# Patient Record
Sex: MALE | Race: WHITE | NOT HISPANIC OR LATINO | Employment: PART TIME | URBAN - METROPOLITAN AREA
[De-identification: names, ages, dates, MRNs, and addresses within clinical notes are randomized per-mention and may not be internally consistent; named-entity substitution may affect disease eponyms.]

---

## 2017-01-31 ENCOUNTER — OFFICE VISIT (OUTPATIENT)
Dept: INTERNAL MEDICINE | Facility: CLINIC | Age: 67
End: 2017-01-31
Payer: COMMERCIAL

## 2017-01-31 VITALS
OXYGEN SATURATION: 95 % | WEIGHT: 203.6 LBS | DIASTOLIC BLOOD PRESSURE: 66 MMHG | TEMPERATURE: 98.3 F | HEART RATE: 90 BPM | SYSTOLIC BLOOD PRESSURE: 122 MMHG | HEIGHT: 68 IN | BODY MASS INDEX: 30.86 KG/M2

## 2017-01-31 DIAGNOSIS — E11.319 DIABETIC RETINOPATHY ASSOCIATED WITH TYPE 2 DIABETES MELLITUS, MACULAR EDEMA PRESENCE UNSPECIFIED, UNSPECIFIED RETINOPATHY SEVERITY: ICD-10-CM

## 2017-01-31 DIAGNOSIS — Z98.84 BARIATRIC SURGERY STATUS: Primary | ICD-10-CM

## 2017-01-31 DIAGNOSIS — N20.0 RENAL CALCULUS: ICD-10-CM

## 2017-01-31 DIAGNOSIS — R53.83 FATIGUE, UNSPECIFIED TYPE: ICD-10-CM

## 2017-01-31 DIAGNOSIS — Z11.59 NEED FOR HEPATITIS C SCREENING TEST: ICD-10-CM

## 2017-01-31 DIAGNOSIS — E55.9 VITAMIN D DEFICIENCY: ICD-10-CM

## 2017-01-31 DIAGNOSIS — E11.65 TYPE 2 DIABETES MELLITUS WITH HYPERGLYCEMIA, WITHOUT LONG-TERM CURRENT USE OF INSULIN (H): ICD-10-CM

## 2017-01-31 DIAGNOSIS — E53.8 VITAMIN B12 DEFICIENCY (NON ANEMIC): ICD-10-CM

## 2017-01-31 DIAGNOSIS — G62.0 VITAMIN B6 INDUCED NEUROPATHY (H): ICD-10-CM

## 2017-01-31 DIAGNOSIS — N20.0 RECURRENT KIDNEY STONES: ICD-10-CM

## 2017-01-31 DIAGNOSIS — T45.2X5A VITAMIN B6 INDUCED NEUROPATHY (H): ICD-10-CM

## 2017-01-31 DIAGNOSIS — D50.9 IRON DEFICIENCY ANEMIA, UNSPECIFIED IRON DEFICIENCY ANEMIA TYPE: ICD-10-CM

## 2017-01-31 DIAGNOSIS — Z13.6 CARDIOVASCULAR SCREENING; LDL GOAL LESS THAN 100: ICD-10-CM

## 2017-01-31 DIAGNOSIS — E34.9 TESTOSTERONE DEFICIENCY: ICD-10-CM

## 2017-01-31 LAB
ALBUMIN SERPL-MCNC: 4.4 G/DL (ref 3.4–5)
ALP SERPL-CCNC: 103 U/L (ref 40–150)
ALT SERPL W P-5'-P-CCNC: 29 U/L (ref 0–70)
ANION GAP SERPL CALCULATED.3IONS-SCNC: 6 MMOL/L (ref 3–14)
AST SERPL W P-5'-P-CCNC: 14 U/L (ref 0–45)
BILIRUB SERPL-MCNC: 0.4 MG/DL (ref 0.2–1.3)
BUN SERPL-MCNC: 28 MG/DL (ref 7–30)
CALCIUM SERPL-MCNC: 9.1 MG/DL (ref 8.5–10.1)
CHLORIDE SERPL-SCNC: 105 MMOL/L (ref 94–109)
CHOLEST SERPL-MCNC: 152 MG/DL
CO2 SERPL-SCNC: 29 MMOL/L (ref 20–32)
CREAT SERPL-MCNC: 1.12 MG/DL (ref 0.66–1.25)
ERYTHROCYTE [DISTWIDTH] IN BLOOD BY AUTOMATED COUNT: 14.7 % (ref 10–15)
FERRITIN SERPL-MCNC: 17 NG/ML (ref 26–388)
GFR SERPL CREATININE-BSD FRML MDRD: 66 ML/MIN/1.7M2
GLUCOSE SERPL-MCNC: 110 MG/DL (ref 70–99)
HCT VFR BLD AUTO: 39.8 % (ref 40–53)
HDLC SERPL-MCNC: 34 MG/DL
HGB BLD-MCNC: 13 G/DL (ref 13.3–17.7)
IRON SATN MFR SERPL: 15 % (ref 15–46)
IRON SERPL-MCNC: 65 UG/DL (ref 35–180)
LDLC SERPL CALC-MCNC: 84 MG/DL
MCH RBC QN AUTO: 29.1 PG (ref 26.5–33)
MCHC RBC AUTO-ENTMCNC: 32.7 G/DL (ref 31.5–36.5)
MCV RBC AUTO: 89 FL (ref 78–100)
NONHDLC SERPL-MCNC: 118 MG/DL
PLATELET # BLD AUTO: 209 10E9/L (ref 150–450)
POTASSIUM SERPL-SCNC: 3.9 MMOL/L (ref 3.4–5.3)
PROT SERPL-MCNC: 8.6 G/DL (ref 6.8–8.8)
RBC # BLD AUTO: 4.47 10E12/L (ref 4.4–5.9)
SODIUM SERPL-SCNC: 140 MMOL/L (ref 133–144)
T4 FREE SERPL-MCNC: 0.82 NG/DL (ref 0.76–1.46)
TIBC SERPL-MCNC: 437 UG/DL (ref 240–430)
TRIGL SERPL-MCNC: 169 MG/DL
TSH SERPL DL<=0.05 MIU/L-ACNC: 2.84 MU/L (ref 0.4–4)
WBC # BLD AUTO: 5.5 10E9/L (ref 4–11)

## 2017-01-31 PROCEDURE — 84443 ASSAY THYROID STIM HORMONE: CPT | Performed by: INTERNAL MEDICINE

## 2017-01-31 PROCEDURE — 84439 ASSAY OF FREE THYROXINE: CPT | Performed by: INTERNAL MEDICINE

## 2017-01-31 PROCEDURE — 82180 ASSAY OF ASCORBIC ACID: CPT | Mod: 90 | Performed by: INTERNAL MEDICINE

## 2017-01-31 PROCEDURE — 80053 COMPREHEN METABOLIC PANEL: CPT | Performed by: INTERNAL MEDICINE

## 2017-01-31 PROCEDURE — 83036 HEMOGLOBIN GLYCOSYLATED A1C: CPT | Performed by: INTERNAL MEDICINE

## 2017-01-31 PROCEDURE — 82728 ASSAY OF FERRITIN: CPT | Performed by: INTERNAL MEDICINE

## 2017-01-31 PROCEDURE — 36415 COLL VENOUS BLD VENIPUNCTURE: CPT | Performed by: INTERNAL MEDICINE

## 2017-01-31 PROCEDURE — 83540 ASSAY OF IRON: CPT | Performed by: INTERNAL MEDICINE

## 2017-01-31 PROCEDURE — 82043 UR ALBUMIN QUANTITATIVE: CPT | Performed by: INTERNAL MEDICINE

## 2017-01-31 PROCEDURE — 83550 IRON BINDING TEST: CPT | Performed by: INTERNAL MEDICINE

## 2017-01-31 PROCEDURE — 85027 COMPLETE CBC AUTOMATED: CPT | Performed by: INTERNAL MEDICINE

## 2017-01-31 PROCEDURE — 84207 ASSAY OF VITAMIN B-6: CPT | Mod: 90 | Performed by: INTERNAL MEDICINE

## 2017-01-31 PROCEDURE — 82746 ASSAY OF FOLIC ACID SERUM: CPT | Performed by: INTERNAL MEDICINE

## 2017-01-31 PROCEDURE — 86803 HEPATITIS C AB TEST: CPT | Performed by: INTERNAL MEDICINE

## 2017-01-31 PROCEDURE — 82306 VITAMIN D 25 HYDROXY: CPT | Performed by: INTERNAL MEDICINE

## 2017-01-31 PROCEDURE — 99215 OFFICE O/P EST HI 40 MIN: CPT | Performed by: INTERNAL MEDICINE

## 2017-01-31 PROCEDURE — 80061 LIPID PANEL: CPT | Performed by: INTERNAL MEDICINE

## 2017-01-31 PROCEDURE — 84425 ASSAY OF VITAMIN B-1: CPT | Mod: 90 | Performed by: INTERNAL MEDICINE

## 2017-01-31 PROCEDURE — 82365 CALCULUS SPECTROSCOPY: CPT | Mod: 90 | Performed by: INTERNAL MEDICINE

## 2017-01-31 PROCEDURE — 99000 SPECIMEN HANDLING OFFICE-LAB: CPT | Performed by: INTERNAL MEDICINE

## 2017-01-31 PROCEDURE — 82607 VITAMIN B-12: CPT | Performed by: INTERNAL MEDICINE

## 2017-01-31 RX ORDER — GLIMEPIRIDE 1 MG/1
1 TABLET ORAL 2 TIMES DAILY
Qty: 90 TABLET | Refills: 3 | Status: SHIPPED | OUTPATIENT
Start: 2017-01-31 | End: 2017-08-21

## 2017-01-31 RX ORDER — BLOOD SUGAR DIAGNOSTIC
STRIP MISCELLANEOUS
COMMUNITY
Start: 2016-09-14 | End: 2018-09-27

## 2017-01-31 RX ORDER — PYRIDOXINE HCL (VITAMIN B6) 25 MG
25 TABLET ORAL DAILY
Qty: 90 TABLET | Refills: 2 | Status: SHIPPED | OUTPATIENT
Start: 2017-01-31 | End: 2017-04-04

## 2017-01-31 RX ORDER — FOLIC ACID 1 MG/1
1 TABLET ORAL DAILY
Qty: 100 TABLET | Refills: 3 | Status: SHIPPED | OUTPATIENT
Start: 2017-01-31 | End: 2017-08-24

## 2017-01-31 RX ORDER — METFORMIN HCL 500 MG
1000 TABLET, EXTENDED RELEASE 24 HR ORAL 2 TIMES DAILY WITH MEALS
Qty: 180 TABLET | Refills: 3 | Status: SHIPPED | OUTPATIENT
Start: 2017-01-31 | End: 2017-04-04

## 2017-01-31 RX ORDER — MELATONIN 10 MG
10000 TABLET, SUBLINGUAL SUBLINGUAL DAILY
Qty: 90 TABLET | Refills: 3 | Status: SHIPPED | OUTPATIENT
Start: 2017-01-31 | End: 2017-08-24

## 2017-01-31 RX ORDER — TAMSULOSIN HYDROCHLORIDE 0.4 MG/1
0.4 CAPSULE ORAL DAILY
Qty: 90 CAPSULE | Refills: 3 | Status: SHIPPED | OUTPATIENT
Start: 2017-01-31 | End: 2017-08-24

## 2017-01-31 NOTE — PROGRESS NOTES
"  SUBJECTIVE:                                                    Los Renee is a 66 year old male who presents to clinic today for the following health issues:      Diabetes Follow-up      Patient is checking blood sugars: Checking only when feel like the sugars are low and they are below 80 - Very rarely when skipping meals.    Diabetic concerns: None     Symptoms of hypoglycemia (low blood sugar):  rarely when skipping meals.     Paresthesias (numbness or burning in feet) or sores: No     Date of last diabetic eye exam: 10/2016       Amount of exercise or physical activity: 4-5 days/week for an average of 45-60 minutes    Problems taking medications regularly: No    Medication side effects: none  Diet: regular (no restrictions)  Flank discomfort      Duration: 1 month    Description (location/character/radiation): L sided.    Intensity:  mild    Accompanying signs and symptoms: none    History (similar episodes/previous evaluation): Passing kidney stones occasionally and feels like some in the L kidney.    Precipitating or alleviating factors: None    Therapies tried and outcome: None     He has a history of recurrent kidney stones, better since taking calcium supplements.    Other significant issues as outlined and addressed in the plan section of this note.      Problem list and histories reviewed & adjusted, as indicated.  Additional history: as documented    Labs reviewed in EPIC  Problem list, Medication list, Allergies, and Medical/Social/Surgical histories reviewed in [facility] and updated as appropriate.    ROS:  14 point ROS negative except as above      OBJECTIVE:                                                    /66 mmHg  Pulse 90  Temp(Src) 98.3  F (36.8  C) (Oral)  Ht 5' 7.5\" (1.715 m)  Wt 203 lb 9.6 oz (92.352 kg)  BMI 31.40 kg/m2  SpO2 95%  Body mass index is 31.4 kg/(m^2).  GENERAL: alert, no distress, overweight, truncal obesity with proximal muscle atrophy  EYES: Eyes grossly normal " to inspection, PERRL and conjunctivae and sclerae normal  NECK: no adenopathy, no asymmetry, masses, or scars and thyroid normal to palpation  RESP: lungs clear to auscultation - no rales, rhonchi or wheezes  CV: regular rate and rhythm, normal S1 S2, no S3 or S4, no murmur, click or rub, no peripheral edema and peripheral pulses strong  ABDOMEN: soft, nontender, no hepatosplenomegaly, no masses and bowel sounds normal  MS: no gross musculoskeletal defects noted, no edema  PSYCH: mentation appears normal, affect normal/bright    Diagnostic Test Results:  Results for orders placed or performed in visit on 11/08/16   Vitamin D Deficiency   Result Value Ref Range    Vitamin D Deficiency screening 67 20 - 75 ug/L   Vitamin C   Result Value Ref Range    Vitamin C 28    Vitamin B12   Result Value Ref Range    Vitamin B12 756 193 - 986 pg/mL   Vitamin B1 whole blood   Result Value Ref Range    Vitamin B1 Whole Blood Level 72    TSH   Result Value Ref Range    TSH 2.76 0.40 - 4.00 mU/L   T4 FREE   Result Value Ref Range    T4 Free 0.92 0.76 - 1.46 ng/dL   Testosterone Free and Total   Result Value Ref Range    Testosterone Total 268 240 - 950 ng/dL    Sex Hormone Binding Globulin 42 11 - 80 nmol/L    Free Testosterone Calculated 4.47 (L) 4.7 - 24.4 ng/dL   Ferritin   Result Value Ref Range    Ferritin 13 (L) 26 - 388 ng/mL   Iron and iron binding capacity   Result Value Ref Range    Iron 43 35 - 180 ug/dL    Iron Binding Cap 436 (H) 240 - 430 ug/dL    Iron Saturation Index 10 (L) 15 - 46 %   Comprehensive metabolic panel   Result Value Ref Range    Sodium 140 133 - 144 mmol/L    Potassium 4.0 3.4 - 5.3 mmol/L    Chloride 106 94 - 109 mmol/L    Carbon Dioxide 28 20 - 32 mmol/L    Anion Gap 6 3 - 14 mmol/L    Glucose 101 (H) 70 - 99 mg/dL    Urea Nitrogen 24 7 - 30 mg/dL    Creatinine 1.14 0.66 - 1.25 mg/dL    GFR Estimate 65 >60 mL/min/1.7m2    GFR Estimate If Black 78 >60 mL/min/1.7m2    Calcium 8.7 8.5 - 10.1 mg/dL     Bilirubin Total 0.3 0.2 - 1.3 mg/dL    Albumin 4.2 3.4 - 5.0 g/dL    Protein Total 8.0 6.8 - 8.8 g/dL    Alkaline Phosphatase 90 40 - 150 U/L    ALT 22 0 - 70 U/L    AST 13 0 - 45 U/L   CBC with platelets   Result Value Ref Range    WBC 5.3 4.0 - 11.0 10e9/L    RBC Count 4.22 (L) 4.4 - 5.9 10e12/L    Hemoglobin 11.9 (L) 13.3 - 17.7 g/dL    Hematocrit 36.7 (L) 40.0 - 53.0 %    MCV 87 78 - 100 fl    MCH 28.2 26.5 - 33.0 pg    MCHC 32.4 31.5 - 36.5 g/dL    RDW 13.7 10.0 - 15.0 %    Platelet Count 187 150 - 450 10e9/L   Folate   Result Value Ref Range    Folate 9.6 >5.4 ng/mL   Vitamin B6   Result Value Ref Range    Vitamin B6 15.8 (L)    Magnesium   Result Value Ref Range    Magnesium 2.1 1.6 - 2.3 mg/dL        ASSESSMENT/PLAN:                                                    Diabetes Type II, A1c Controlled, non-insulin dependent   Associated with the following complications    Renal Complications:  CKD    Ophthalmologic Complications: None    Neurologic Complications: None    Peripheral Vascular Complications:  None    Other: None   Plan:  No changes in the patient's current treatment plan          DIAGNOSIS/PLAN:     ICD-10-CM    1. Bariatric surgery status Z98.84 Cyanocobalamin (B-12 SUPER STRENGTH) 5000 MCG/ML LIQD     Calcium Carb-Cholecalciferol (CALCIUM 1000 + D) 1000-800 MG-UNIT TABS     Cholecalciferol (VITAMIN D3) 31319 UNITS TABS     ferrous fumarate 65 mg, [place]. FE,-Vitamin C 125 mg (VITRON C)  MG TABS tablet     DX Hip/Pelvis/Spine     Vitamin D Deficiency     Vitamin C     Vitamin B12     Iron and iron binding capacity     Comprehensive metabolic panel     CBC with platelets     Ferritin     Vitamin B1 whole blood     Folate     Vitamin B6     folic acid (FOLVITE) 1 MG tablet     pyridOXINE (VITAMIN  B-6) 25 MG tablet    WITH MILDLY LOW FOLATE   2. Type 2 diabetes mellitus with hyperglycemia, without long-term current use of insulin (H) E11.65 glimepiride (AMARYL) 1 MG tablet     metFORMIN  (GLUCOPHAGE-XR) 500 MG 24 hr tablet     Hemoglobin A1c     Albumin Random Urine Quantitative   3. 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 Vitamin B12 deficiency (non anemic) E53.8 Cyanocobalamin (B-12 SUPER STRENGTH) 5000 MCG/ML LIQD     Vitamin B12     Folate     folic acid (FOLVITE) 1 MG tablet   5. Vitamin D deficiency E55.9 Calcium Carb-Cholecalciferol (CALCIUM 1000 + D) 1000-800 MG-UNIT TABS     Cholecalciferol (VITAMIN D3) 91767 UNITS TABS     Vitamin D Deficiency   6. Recurrent kidney stones N20.0 Calcium Carb-Cholecalciferol (CALCIUM 1000 + D) 1000-800 MG-UNIT TABS     Stone analysis     UROLOGY ADULT REFERRAL     NEPHROLOGY ADULT REFERRAL   7. Iron deficiency anemia, unspecified iron deficiency anemia type D50.9 ferrous fumarate 65 mg, [place]. FE,-Vitamin C 125 mg (VITRON C)  MG TABS tablet     Vitamin C     Iron and iron binding capacity     Comprehensive metabolic panel     CBC with platelets     Ferritin   8. Renal calculus N20.0 tamsulosin (FLOMAX) 0.4 MG capsule   9. Testosterone deficiency E29.1 DX Hip/Pelvis/Spine     UROLOGY ADULT REFERRAL     CANCELED: UROLOGY ADULT REFERRAL   10. Fatigue, unspecified type R53.83 TSH     T4 FREE   11. Need for hepatitis C screening test Z11.59 Hepatitis C Screen Reflex to HCV RNA Quant and Genotype   12. CARDIOVASCULAR SCREENING; LDL GOAL LESS THAN 100 Z13.6 Lipid panel reflex to direct LDL       SIGNIFICANT ISSUES RE The primary encounter diagnosis was Bariatric surgery status. Diagnoses of Type 2 diabetes mellitus with hyperglycemia, without long-term current use of insulin (H), Vitamin B6 induced neuropathy (H), Vitamin B12 deficiency (non anemic), Vitamin D deficiency, Recurrent kidney stones, Iron deficiency anemia, unspecified iron deficiency anemia type, Renal calculus, Testosterone deficiency, Fatigue, unspecified type, Need for hepatitis C screening test, and CARDIOVASCULAR SCREENING; LDL GOAL LESS THAN 100  were also pertinent to this visit. AS NOTED AND ADDRESSED ABOVE   MEDS AND AND LABS AS ORDERED TO ADDRESS PREVIOUS AND CURRENT ABNORMAL INDICES    SEE PATIENT INSTRUCTION SECTION FOR FOLLOW UP PLAN    Los IS TO CONTINUE OTHER TREATMENT REGIMEN/PLANS EXCEPT AS INDICATED    COMPLIANCE WITH MEDICATIONS DIET AND EXERCISE PLANS ENCOURAGED    DISCONTINUED MEDS:  Medications Discontinued During This Encounter   Medication Reason     ciprofloxacin (CIPRO) 500 MG tablet Therapy completed     phenazopyridine (PYRIDIUM) 100 MG tablet Not filled/taken by Patient     Cyanocobalamin (B-12 SUPER STRENGTH) 5000 MCG/ML LIQD Reorder     Calcium Carb-Cholecalciferol (CALCIUM 1000 + D) 1000-800 MG-UNIT TABS Reorder     glimepiride (AMARYL) 1 MG tablet Reorder     Cholecalciferol (VITAMIN D3) 42934 UNITS TABS Reorder     metFORMIN (GLUCOPHAGE-XR) 500 MG 24 hr tablet Reorder     ferrous fumarate 65 mg, [place]. FE,-Vitamin C 125 mg (VITRON C)  MG TABS Reorder     tamsulosin (FLOMAX) 0.4 MG 24 hr capsule Reorder       CURRENT MED LIST WITH CHANGES AS NOTED BELOW:  Current Outpatient Prescriptions   Medication Sig Dispense Refill     ACCU-CHEK UCHE PLUS test strip        Cyanocobalamin (B-12 SUPER STRENGTH) 5000 MCG/ML LIQD Place 1 mL under the tongue every morning FOR VITAMIN B12 SUPPLEMENTATION, PLEASE PLACE UNDER THE TONGUE 2 Bottle PRN     Calcium Carb-Cholecalciferol (CALCIUM 1000 + D) 1000-800 MG-UNIT TABS Take 1 tablet by mouth every evening TAKE WITH FOOD, FOR BONE HEALTH AND FOR VITAMIN D SUPPLEMENTATION, chewable tabs 100 tablet PRN     glimepiride (AMARYL) 1 MG tablet Take 1 tablet (1 mg) by mouth 2 times daily INDICATION: TO TREAT DIABETES 90 tablet 3     Cholecalciferol (VITAMIN D3) 05001 UNITS TABS Take 10,000 Units by mouth daily INDICATION:VITAMIN D DEFICIENCY (LOW VITAMIN D) 90 tablet 3     metFORMIN (GLUCOPHAGE-XR) 500 MG 24 hr tablet Take 2 tablets (1,000 mg) by mouth 2 times daily (with meals) INDICATION: TO TREAT  DIABETES, CONTROL BLOOD SUGAR AND TO CORRECT METABOLISM 180 tablet 3     ferrous fumarate 65 mg, [place]. FE,-Vitamin C 125 mg (VITRON C)  MG TABS tablet Take 1 tablet by mouth 2 times daily (before meals) INDICATION: IRON SUPPLEMENT 60 tablet prn     tamsulosin (FLOMAX) 0.4 MG capsule Take 1 capsule (0.4 mg) by mouth daily INDICATION: BPH 90 capsule 3     folic acid (FOLVITE) 1 MG tablet Take 1 tablet (1 mg) by mouth daily INDICATION: FOLIC ACID SUPPLEMENT 100 tablet 3     pyridOXINE (VITAMIN  B-6) 25 MG tablet Take 1 tablet (25 mg) by mouth daily INDICATION: VITAMIN B6 DEFICIENCY 90 tablet 2     ACE/ARB NOT PRESCRIBED, INTENTIONAL, ACE & ARB not prescribed due to CKD (Chronic Kidney Disease)       [DISCONTINUED] glimepiride (AMARYL) 1 MG tablet Take 1 tablet (1 mg) by mouth 2 times daily INDICATION: TO TREAT DIABETES 90 tablet 3     [DISCONTINUED] metFORMIN (GLUCOPHAGE-XR) 500 MG 24 hr tablet Take 2 tablets (1,000 mg) by mouth 2 times daily (with meals) INDICATION: TO TREAT DIABETES, CONTROL BLOOD SUGAR AND TO CORRECT METABOLISM 180 tablet 3         Office visit time > 40 mins, greater than 50% of which was spent obtaining history, reviewing medications, counseling re compliance with treatment plan, discussion of treatment, follow up plans, and coordination of care.         Patient Instructions   ** FOLLOW UP PLAN**:    PLEASE SCHEDULE OFFICE VISIT SOONEST AVAILABILITY FROM TODAY TO FOLLOW UP ON DIABETES, KIDNEY STONES, VITAMIN DEFICIENCIES, AND TO REVIEW TEST RESULTS       YOU HAVE BEEN REFERRED FOR DEXA, AND TO UROLOGY, AND KIDNEY DOCTORS TO ADDRESS ISSUES RAISED TODAY   PLEASE  MAKE SURE TO SCHEDULE DEXA APPOINTMENT(S) AT THE   OR BY CALLING THE NUMBER BELOW AND THE OTHER APPOINTMENT(S) BY TELEPHONE      YOU MAY CONTACT THE CLINIC IF ANY QUESTIONS OR CONCERNS -[phone] OR VIA ROSALINDA Irene MD  [facility]

## 2017-01-31 NOTE — Clinical Note
Please abstract the following data from this visit with this patient into the appropriate field in Epic:  Eye exam with ophthalmology on this date: 11/01/16

## 2017-01-31 NOTE — NURSING NOTE
"Chief Complaint   Patient presents with     Flank Pain     x 1 month. Discomfort on the L flank.     RECHECK       Initial /66 mmHg  Pulse 90  Temp(Src) 98.3  F (36.8  C) (Oral)  Ht 5' 7.5\" (1.715 m)  Wt 203 lb 9.6 oz (92.352 kg)  BMI 31.40 kg/m2  SpO2 95% Estimated body mass index is 31.4 kg/(m^2) as calculated from the following:    Height as of this encounter: 5' 7.5\" (1.715 m).    Weight as of this encounter: 203 lb 9.6 oz (92.352 kg).  BP completed using cuff size: regular    Kaminibose MA      "

## 2017-01-31 NOTE — MR AVS SNAPSHOT
After Visit Summary   1/31/2017    Los Renee    MRN: [id number]           Patient Information     Date Of Birth          1950        Visit Information        Provider Department      1/31/2017 5:00 PM Karley Irene MD [facility]        Today's Diagnoses     Bariatric surgery status    -  1     Type 2 diabetes mellitus with hyperglycemia, without long-term current use of insulin (H)         Vitamin B6 induced neuropathy (H)         Diabetic retinopathy associated with type 2 diabetes mellitus, macular edema presence unspecified, unspecified retinopathy severity (H)         CARDIOVASCULAR SCREENING; LDL GOAL LESS THAN 100         Vitamin B12 deficiency (non anemic)         Vitamin D deficiency         Recurrent kidney stones         Iron deficiency anemia, unspecified iron deficiency anemia type         Renal calculus         Testosterone deficiency         Fatigue, unspecified type         Need for hepatitis C screening test           Care Instructions    ** FOLLOW UP PLAN**:    PLEASE SCHEDULE OFFICE VISIT SOONEST AVAILABILITY FROM TODAY TO FOLLOW UP ON DIABETES, KIDNEY STONES, VITAMIN DEFICIENCIES, AND TO REVIEW TEST RESULTS       YOU HAVE BEEN REFERRED FOR DEXA, AND TO UROLOGY, AND KIDNEY DOCTORS TO ADDRESS ISSUES RAISED TODAY   PLEASE  MAKE SURE TO SCHEDULE DEXA APPOINTMENT(S) AT THE   OR BY CALLING THE NUMBER BELOW AND THE OTHER APPOINTMENT(S) BY TELEPHONE      YOU MAY CONTACT THE CLINIC IF ANY QUESTIONS OR CONCERNS -[phone] OR VIA [facility]                   Follow-ups after your visit        Additional Services     NEPHROLOGY ADULT REFERRAL       Your provider has referred you to: N: Judy Lacy [phone]   [email or web address]    Please be aware that coverage of these services is subject to the terms and limitations of your health insurance plan.  Call member services at your health plan with any  benefit or coverage questions.      Reason for referral:  RECURRENT KIDNEY STONES  Please bring the following to your appointment:    >>   Any x-rays, CTs or MRIs which have been performed.  Contact the facility where they were done to arrange for  prior to your scheduled appointment.   >>   List of current medications   >>   This referral request   >>   Any documents/labs given to you for this referral            UROLOGY ADULT REFERRAL       Your provider has referred you to: [facility] [place]. Horacio Lacy [phone]   [email or web address] - Dr. Reid Jo    Please be aware that coverage of these services is subject to the terms and limitations of your health insurance plan.  Call member services at your health plan with any benefit or coverage questions.      Please bring the following with you to your appointment:    (1) Any X-Rays, CTs or MRIs which have been performed.  Contact the facility where they were done to arrange for  prior to your scheduled appointment.    (2) List of current medications  (3) This referral request   (4) Any documents/labs given to you for this referral                  Future tests that were ordered for you today     Open Future Orders        Priority Expected Expires Ordered    DX Hip/Pelvis/Spine Routine 1/31/2017 1/30/2018 1/31/2017    Stone analysis Routine  1/31/2018 1/31/2017            Who to contact     If you have questions or need follow up information about today's clinic visit or your schedule please contact [facility] directly at [phone].  Normal or non-critical lab and imaging results will be communicated to you by MyChart, letter or phone within 4 business days after the clinic has received the results. If you do not hear from us within 7 days, please contact the clinic through MyChart or phone. If you have a critical or abnormal lab result, we will notify you by phone as soon as possible.  Submit refill  "requests through [employer] or call your pharmacy and they will forward the refill request to us. Please allow 3 business days for your refill to be completed.          Additional Information About Your Visit        [employer] Information     [employer] lets you send messages to your doctor, view your test results, renew your prescriptions, schedule appointments and more. To sign up, go to [email or web address] . Click on \"Log in\" on the left side of the screen, which will take you to the Welcome page. Then click on \"Sign up Now\" on the right side of the page.     You will be asked to enter the access code listed below, as well as some personal information. Please follow the directions to create your username and password.     Your access code is: [email or web address]  Expires: 2017  6:20 PM     Your access code will  in 90 days. If you need help or a new code, please call your [facility] clinic or [phone].        Care EveryWhere ID     This is your Care EveryWhere ID. This could be used by other organizations to access your [facility] medical records  [id number]        Your Vitals Were     Pulse Temperature Height BMI (Body Mass Index) Pulse Oximetry       90 98.3  F (36.8  C) (Oral) 5' 7.5\" (1.715 m) 31.40 kg/m2 95%        Blood Pressure from Last 3 Encounters:   17 122/66   16 130/70   16 151/72    Weight from Last 3 Encounters:   17 203 lb 9.6 oz (92.352 kg)   11/10/16 200 lb (90.719 kg)   16 202 lb (91.627 kg)              We Performed the Following     Albumin Random Urine Quantitative     CBC with platelets     Comprehensive metabolic panel     Ferritin     Folate     Hemoglobin A1c     Hepatitis C Screen Reflex to HCV RNA Quant and Genotype     Iron and iron binding capacity     Lipid panel reflex to direct LDL     NEPHROLOGY ADULT REFERRAL     T4 FREE     TSH     UROLOGY ADULT REFERRAL     Vitamin B1 whole blood     Vitamin B12     Vitamin B6     Vitamin C     Vitamin D " Deficiency          Today's Medication Changes          These changes are accurate as of: 1/31/17  6:20 PM.  If you have any questions, ask your nurse or doctor.               Start taking these medicines.        Dose/Directions    folic acid 1 MG tablet   Commonly known as:  FOLVITE   Used for:  Bariatric surgery status, Vitamin B12 deficiency (non anemic)   Started by:  Karley Irene MD        Dose:  1 mg   Take 1 tablet (1 mg) by mouth daily INDICATION: FOLIC ACID SUPPLEMENT   Quantity:  100 tablet   Refills:  3       pyridOXINE 25 MG tablet   Commonly known as:  vitamin B-6   Used for:  Bariatric surgery status, Vitamin B6 induced neuropathy (H)   Started by:  Karley Irene MD        Dose:  25 mg   Take 1 tablet (25 mg) by mouth daily INDICATION: VITAMIN B6 DEFICIENCY   Quantity:  90 tablet   Refills:  2         These medicines have changed or have updated prescriptions.        Dose/Directions    tamsulosin 0.4 MG capsule   Commonly known as:  FLOMAX   This may have changed:  additional instructions   Used for:  Renal calculus   Changed by:  Karley Irene MD        Dose:  0.4 mg   Take 1 capsule (0.4 mg) by mouth daily INDICATION: BPH   Quantity:  90 capsule   Refills:  3            Where to get your medicines      These medications were sent to [facility] [address]  [address] [place] [address]     Phone:  [phone]    - Calcium Carb-Cholecalciferol 1000-800 MG-UNIT Tabs  - Cyanocobalamin 5000 MCG/ML Liqd  - ferrous fumarate 65 mg ([place]. FE)-Vitamin C 125 mg  MG Tabs tablet  - folic acid 1 MG tablet  - glimepiride 1 MG tablet  - metFORMIN 500 MG 24 hr tablet  - pyridOXINE 25 MG tablet  - tamsulosin 0.4 MG capsule  - Vitamin D3 67703 UNITS Tabs             Primary Care Provider Office Phone # Fax #    Karley Irene [phone]       [facility] [address] [facility] [address]        Thank you!     Thank  you for choosing [facility]  for your care. Our goal is always to provide you with excellent care. Hearing back from our patients is one way we can continue to improve our services. Please take a few minutes to complete the written survey that you may receive in the mail after your visit with us. Thank you!             Your Updated Medication List - Protect others around you: Learn how to safely use, store and throw away your medicines at www.disposemymeds.org.          This list is accurate as of: 1/31/17  6:20 PM.  Always use your most recent med list.                   Brand Name Dispense Instructions for use    ACCU-CHEK UCHE PLUS test strip   Generic drug:  blood glucose monitoring          ACE/ARB NOT PRESCRIBED (INTENTIONAL)      ACE & ARB not prescribed due to CKD (Chronic Kidney Disease)       Calcium Carb-Cholecalciferol 1000-800 MG-UNIT Tabs    CALCIUM 1000 + D    100 tablet    Take 1 tablet by mouth every evening TAKE WITH FOOD, FOR BONE HEALTH AND FOR VITAMIN D SUPPLEMENTATION, chewable tabs       Cyanocobalamin 5000 MCG/ML Liqd    B-12 SUPER STRENGTH    2 Bottle    Place 1 mL under the tongue every morning FOR VITAMIN B12 SUPPLEMENTATION, PLEASE PLACE UNDER THE TONGUE       ferrous fumarate 65 mg ([place]. FE)-Vitamin C 125 mg  MG Tabs tablet    VITRON C    60 tablet    Take 1 tablet by mouth 2 times daily (before meals) INDICATION: IRON SUPPLEMENT       folic acid 1 MG tablet    FOLVITE    100 tablet    Take 1 tablet (1 mg) by mouth daily INDICATION: FOLIC ACID SUPPLEMENT       glimepiride 1 MG tablet    AMARYL    90 tablet    Take 1 tablet (1 mg) by mouth 2 times daily INDICATION: TO TREAT DIABETES       metFORMIN 500 MG 24 hr tablet    GLUCOPHAGE-XR    180 tablet    Take 2 tablets (1,000 mg) by mouth 2 times daily (with meals) INDICATION: TO TREAT DIABETES, CONTROL BLOOD SUGAR AND TO CORRECT METABOLISM       pyridOXINE 25 MG tablet    vitamin B-6    90 tablet    Take 1  tablet (25 mg) by mouth daily INDICATION: VITAMIN B6 DEFICIENCY       tamsulosin 0.4 MG capsule    FLOMAX    90 capsule    Take 1 capsule (0.4 mg) by mouth daily INDICATION: BPH       Vitamin D3 66455 UNITS Tabs     90 tablet    Take 10,000 Units by mouth daily INDICATION:VITAMIN D DEFICIENCY (LOW VITAMIN D)

## 2017-02-01 ENCOUNTER — TELEPHONE (OUTPATIENT)
Dept: INTERNAL MEDICINE | Facility: CLINIC | Age: 67
End: 2017-02-01

## 2017-02-01 DIAGNOSIS — E53.8 VITAMIN B12 DEFICIENCY (NON ANEMIC): ICD-10-CM

## 2017-02-01 DIAGNOSIS — E55.9 VITAMIN D DEFICIENCY: ICD-10-CM

## 2017-02-01 DIAGNOSIS — N20.0 RECURRENT KIDNEY STONES: ICD-10-CM

## 2017-02-01 DIAGNOSIS — Z98.84 BARIATRIC SURGERY STATUS: Primary | ICD-10-CM

## 2017-02-01 LAB
CREAT UR-MCNC: 128 MG/DL
FOLATE SERPL-MCNC: 11.4 NG/ML
HBA1C MFR BLD: 7.3 % (ref 4.3–6)
MICROALBUMIN UR-MCNC: 48 MG/L
MICROALBUMIN/CREAT UR: 37.19 MG/G CR (ref 0–17)
VIT B12 SERPL-MCNC: 4784 PG/ML (ref 193–986)

## 2017-02-01 NOTE — PATIENT INSTRUCTIONS
** FOLLOW UP PLAN**:    PLEASE SCHEDULE OFFICE VISIT SOONEST AVAILABILITY FROM TODAY TO FOLLOW UP ON DIABETES, KIDNEY STONES, VITAMIN DEFICIENCIES, AND TO REVIEW TEST RESULTS       YOU HAVE BEEN REFERRED FOR DEXA, AND TO UROLOGY, AND KIDNEY DOCTORS TO ADDRESS ISSUES RAISED TODAY   PLEASE  MAKE SURE TO SCHEDULE DEXA APPOINTMENT(S) AT THE   OR BY CALLING THE NUMBER BELOW AND THE OTHER APPOINTMENT(S) BY TELEPHONE      YOU MAY CONTACT THE CLINIC IF ANY QUESTIONS OR CONCERNS -[phone] OR VIA [employer]

## 2017-02-01 NOTE — TELEPHONE ENCOUNTER
[facility] calling in regards to Vitamin D and Calcium with D prescription.  Want to verify if MD wants pt on 10,000 units of vitamin D and the the 1000 units of vitamin D in the calcium supplement daily?  Please advise.

## 2017-02-02 LAB
DEPRECATED CALCIDIOL+CALCIFEROL SERPL-MC: 62 UG/L (ref 20–75)
HCV AB SERPL QL IA: NORMAL

## 2017-02-02 NOTE — TELEPHONE ENCOUNTER
Informed Px of message below.  Left message on pt's machine to call back in regards to lab results and correction on Rx

## 2017-02-02 NOTE — TELEPHONE ENCOUNTER
Yes I do want him on the 10,000 units per day vitamin D3. He has a history of gastric bypass and therefore has sub optimal vitamin D absorption. Given his history of kidney stones, On further thought I have reduced the does of calcium to 600/400 one tablet PO daily please be sure to call him and let him know of the decrease in dose. Also let him know that based on his recent labs his B12 level is overcorrected and he may decrease the dose of his B12 supplementation to half a dropperful daily. I will contact him with the rest of his test results as they become available.

## 2017-02-03 LAB
VIT B1 BLD-MCNC: 69 UG/DL
VIT B6 SERPL-MCNC: 35.2 NG/ML
VIT C SERPL-MCNC: 68 MG/DL

## 2017-02-04 LAB
APPEARANCE STONE: NORMAL
COMPN STONE: NORMAL
NUMBER STONE: 5
SIZE STONE: NORMAL MM3
WT STONE: 131 MG

## 2017-02-08 ENCOUNTER — RADIANT APPOINTMENT (OUTPATIENT)
Dept: BONE DENSITY | Facility: CLINIC | Age: 67
End: 2017-02-08
Attending: INTERNAL MEDICINE
Payer: COMMERCIAL

## 2017-02-08 DIAGNOSIS — E34.9 TESTOSTERONE DEFICIENCY: ICD-10-CM

## 2017-02-08 DIAGNOSIS — Z98.84 BARIATRIC SURGERY STATUS: ICD-10-CM

## 2017-02-08 PROCEDURE — 77080 DXA BONE DENSITY AXIAL: CPT | Performed by: INTERNAL MEDICINE

## 2017-03-01 NOTE — PROGRESS NOTES
Dear Los,   Your recent test results were reviewed and are within acceptable limits With the following exceptions:    1.Your B1 level is on the low end of normal. To correct this I would recommend you increasing your intake of B1 enriched foods.  2.Your B12 level is overcorrected. You may decrease frequency of dosing to every other day.  3. Your hemoglobin A-1 C and your hemoglobin have improved significantly. Keep up the good work. However given the fact that your iron stores are still very low, which may cause Symptoms of fatigue,You might want to consider getting an iron infusion. Please let me know if you would like to proceed so I can make arrangements for IV iron therapy.  4. Your kidneys are leaking a little bit more protein than they have previously. We'll discuss this at your next office visit. The kidney stone analysis shows that the stone submitted for analysis is a calcium oxalate stone. Please ensure that you take your calcium supplement with food. Appropriate referrals to nephrology and urology have already been put in place. Please be sure to keep up with your appointments.     Please continue with other treatment, and follow up plans as discussed and do not hesitate to contact me with any questions or concerns.  Stay healthy!    Regards,  Dr. Irene  [facility]

## 2017-03-01 NOTE — PROGRESS NOTES
Dear Los,   Your recent DEXA scan results were reviewed and are within acceptable limits. Please continue with current treatment ie Calcium and Vitamin D, weight bearing exercise , and follow up plans as discussed, and contact the office with  any questions or concerns.  A repeat DEXA is recommended in 2 years.      Regards,  Dr. Irene

## 2017-04-03 ENCOUNTER — TRANSFERRED RECORDS (OUTPATIENT)
Dept: HEALTH INFORMATION MANAGEMENT | Facility: CLINIC | Age: 67
End: 2017-04-03

## 2017-04-04 ENCOUNTER — OFFICE VISIT (OUTPATIENT)
Dept: INTERNAL MEDICINE | Facility: CLINIC | Age: 67
End: 2017-04-04
Payer: COMMERCIAL

## 2017-04-04 VITALS
SYSTOLIC BLOOD PRESSURE: 128 MMHG | DIASTOLIC BLOOD PRESSURE: 76 MMHG | TEMPERATURE: 98 F | BODY MASS INDEX: 32.41 KG/M2 | HEART RATE: 87 BPM | OXYGEN SATURATION: 96 % | WEIGHT: 210 LBS

## 2017-04-04 DIAGNOSIS — E51.9 THIAMINE DEFICIENCY: ICD-10-CM

## 2017-04-04 DIAGNOSIS — T45.2X5A VITAMIN B6 INDUCED NEUROPATHY (H): ICD-10-CM

## 2017-04-04 DIAGNOSIS — N20.0 RECURRENT KIDNEY STONES: ICD-10-CM

## 2017-04-04 DIAGNOSIS — E11.29 MICROALBUMINURIA DUE TO TYPE 2 DIABETES MELLITUS (H): ICD-10-CM

## 2017-04-04 DIAGNOSIS — E54 VITAMIN C DEFICIENCY: ICD-10-CM

## 2017-04-04 DIAGNOSIS — Z98.84 BARIATRIC SURGERY STATUS: ICD-10-CM

## 2017-04-04 DIAGNOSIS — E78.5 HYPERLIPIDEMIA LDL GOAL <70: ICD-10-CM

## 2017-04-04 DIAGNOSIS — E11.65 TYPE 2 DIABETES MELLITUS WITH HYPERGLYCEMIA, WITHOUT LONG-TERM CURRENT USE OF INSULIN (H): Primary | ICD-10-CM

## 2017-04-04 DIAGNOSIS — E53.8 VITAMIN B12 DEFICIENCY (NON ANEMIC): ICD-10-CM

## 2017-04-04 DIAGNOSIS — K90.9 DIARRHEA DUE TO MALABSORPTION: ICD-10-CM

## 2017-04-04 DIAGNOSIS — N20.0 URIC ACID KIDNEY STONE: ICD-10-CM

## 2017-04-04 DIAGNOSIS — E55.9 VITAMIN D DEFICIENCY: ICD-10-CM

## 2017-04-04 DIAGNOSIS — E13.319 RETINOPATHY DUE TO SECONDARY DIABETES MELLITUS (H): ICD-10-CM

## 2017-04-04 DIAGNOSIS — G62.0 VITAMIN B6 INDUCED NEUROPATHY (H): ICD-10-CM

## 2017-04-04 DIAGNOSIS — D50.9 IRON DEFICIENCY ANEMIA, UNSPECIFIED IRON DEFICIENCY ANEMIA TYPE: ICD-10-CM

## 2017-04-04 DIAGNOSIS — R19.7 DIARRHEA DUE TO MALABSORPTION: ICD-10-CM

## 2017-04-04 DIAGNOSIS — R80.9 MICROALBUMINURIA DUE TO TYPE 2 DIABETES MELLITUS (H): ICD-10-CM

## 2017-04-04 PROCEDURE — 99215 OFFICE O/P EST HI 40 MIN: CPT | Performed by: INTERNAL MEDICINE

## 2017-04-04 RX ORDER — LANOLIN ALCOHOL/MO/W.PET/CERES
100 CREAM (GRAM) TOPICAL DAILY
Qty: 90 TABLET | Status: SHIPPED | OUTPATIENT
Start: 2017-04-04 | End: 2017-08-24

## 2017-04-04 RX ORDER — LISINOPRIL 2.5 MG/1
2.5 TABLET ORAL EVERY MORNING
Qty: 30 TABLET | Status: SHIPPED | OUTPATIENT
Start: 2017-04-04 | End: 2017-08-24

## 2017-04-04 RX ORDER — PYRIDOXINE HCL (VITAMIN B6) 25 MG
25 TABLET ORAL DAILY
Qty: 90 TABLET | Refills: 2 | Status: SHIPPED | OUTPATIENT
Start: 2017-04-04 | End: 2017-08-24

## 2017-04-04 RX ORDER — HYPROMELLOSE
1 POWDER (GRAM) MISCELLANEOUS
Qty: 2500 G | Refills: 3 | Status: SHIPPED | OUTPATIENT
Start: 2017-04-04 | End: 2017-04-06

## 2017-04-04 NOTE — NURSING NOTE
"Chief Complaint   Patient presents with     RECHECK     Dexa Scan       Initial /76 (BP Location: Right arm, Patient Position: Chair, Cuff Size: Adult Regular)  Pulse 87  Temp 98  F (36.7  C) (Oral)  Wt 210 lb (95.3 kg)  SpO2 96%  BMI 32.41 kg/m2 Estimated body mass index is 32.41 kg/(m^2) as calculated from the following:    Height as of 1/31/17: 5' 7.5\" (1.715 m).    Weight as of this encounter: 210 lb (95.3 kg).  Medication Reconciliation: complete    "

## 2017-04-04 NOTE — MR AVS SNAPSHOT
After Visit Summary   4/4/2017    Los Renee    MRN: [id number]           Patient Information     Date Of Birth          1950        Visit Information        Provider Department      4/4/2017 4:30 PM Karley Irene MD [facility]        Today's Diagnoses     Type 2 diabetes mellitus with hyperglycemia, without long-term current use of insulin (H)    -  1    Microalbuminuria due to type 2 diabetes mellitus (H)        Vitamin B6 induced neuropathy (H)        Retinopathy due to secondary diabetes mellitus (H)        Hyperlipidemia LDL goal <70        Bariatric surgery status        Vitamin B12 deficiency (non anemic)        Vitamin D deficiency        Uric acid kidney stone        Thiamine deficiency        Iron deficiency anemia, unspecified iron deficiency anemia type        Bariatric surgery status        Recurrent kidney stones        Diarrhea due to malabsorption        Vitamin C deficiency          Care Instructions    ** FOLLOW UP PLAN**:    PLEASE SCHEDULE FASTING LABS WITH OFFICE VISIT 3 MONTHS FROM TODAY TO FOLLOW UP ON  DIABETES, KIDNEY STONES, BLOOD PRESSURE, OTHER MEDICAL ISSUES, AND TO REVIEW TEST RESULTS     BE SURE TO SCHEDULE YOUR LAB DRAW APPOINTMENT FOR AT LEAST 1 WEEK BEFORE YOUR NEXT VISIT    I HAVE ALSO REFERRED YOU FOR  IV IRON INFUSION DUE TO LOW IRON UNRESPONSIVE TO ORAL SUPPLEMENTS , AND DIABETES EDUCATION TO HELP WITH DIETARY AND LIFESTYLE MANAGEMENT OF DIABETES     PLEASE FOLLOW-UP ON YOUR KIDNEY DOCTOR AND UROLOGY APPOINTMENTS    I RECOMMEND THAT YOU CHECK YOUR BLOOD SUGARS BEFORE BREAKFAST, 2 HOURS AFTER LUNCH, BEFORE SUPPERTIME, AND BEFORE BEDTIME    YOU MAY CONTACT THE CLINIC IF ANY QUESTIONS OR CONCERNS -[phone] OR VIA resmio                 Follow-ups after your visit        Additional Services     DIABETES EDUCATOR REFERRAL       Your provider has referred you to Diabetes Education: [facility]: Diabetes Education - All [place]  [facility] [phone]   [email or web address]    This is a Previous Diagnosis - A1C Goal < 7%  Type of diabetes is Type 2 - On Oral Medication                                                          Lab Results       Component                Value               Date                       A1C                      7.3                 01/31/2017                 A1C                      8.7                 09/30/2010                 A1C                      7.9                 02/18/2010                 A1C                      7.5                 11/24/2009                 A1C                      7.6                 11/24/2008            If an urgent visit is needed or A1C is above 12, Care Team to call the diabetes education team at [phone] or send a message to the diabetes education pool (P DIAB ED-PATIENT CARE).    Diabetes education focus: Knowledge: Comprehensive Knowledge Assessment and Instruction, Healthy Eating, Being Active, Taking Medication, Reducing Risks (Preventing Diabetes Complications) and Healthy Coping and Medical Nutrition Therapy: Diabetes Meal Planning (Please schedule appointment with a Registered Dietitian, CDE), Heart Healthy Eating and Diabetes (Please schedule appointment with a Registered Dietitian, CDE) and Weight Loss and Diabetes (Please schedule appointment with a Registered Dietitian, CDE)              Please be aware that coverage of these services is subject to the terms and limitations of your health insurance plan.  Call member services at your health plan to determine Diabetes Self-Management Training benefits and ask which blood glucose monitor brands are covered by your plan.      Please bring the following to your appointment:    -   List of current medications   -   List of blood glucose monitor brands that are covered by your insurance plan  -   Blood glucose monitor and log book  -   Food records for the 3 days prior to  your visit                  Future tests that were ordered for you today     Open Future Orders        Priority Expected Expires Ordered    Ferritin Routine 4/4/2017 10/2/2017 4/4/2017    CBC with platelets Routine 4/4/2017 10/2/2017 4/4/2017    Vitamin C Routine 4/4/2017 10/2/2017 4/4/2017    Comprehensive metabolic panel Routine 4/4/2017 10/2/2017 4/4/2017    Vitamin B12 Routine 4/4/2017 10/2/2017 4/4/2017    Vitamin D Deficiency Routine 4/4/2017 10/2/2017 4/4/2017    Vitamin B1 whole blood Routine 4/4/2017 10/2/2017 4/4/2017    Vitamin B6 Routine 4/4/2017 10/2/2017 4/4/2017    Iron and iron binding capacity Routine 4/4/2017 10/2/2017 4/4/2017    Albumin Random Urine Quantitative Routine 4/4/2017 4/4/2018 4/4/2017    Hemoglobin A1c Routine 4/4/2017 10/2/2017 4/4/2017            Who to contact     If you have questions or need follow up information about today's clinic visit or your schedule please contact [facility] directly at [phone].  Normal or non-critical lab and imaging results will be communicated to you by MyChart, letter or phone within 4 business days after the clinic has received the results. If you do not hear from us within 7 days, please contact the clinic through [email or web address] or phone. If you have a critical or abnormal lab result, we will notify you by phone as soon as possible.  Submit refill requests through [facility] or call your pharmacy and they will forward the refill request to us. Please allow 3 business days for your refill to be completed.          Additional Information About Your Visit        [email or web address] Information     [employer] gives you secure access to your electronic health record. If you see a primary care provider, you can also send messages to your care team and make appointments. If you have questions, please call your primary care clinic.  If you do not have a primary care provider, please call [phone] and they will assist you.        Care EveryWhere ID      This is your Care EveryWhere ID. This could be used by other organizations to access your [facility] medical records  [id number]        Your Vitals Were     Pulse Temperature Pulse Oximetry BMI (Body Mass Index)          87 98  F (36.7  C) (Oral) 96% 32.41 kg/m2         Blood Pressure from Last 3 Encounters:   04/04/17 128/76   01/31/17 122/66   11/08/16 130/70    Weight from Last 3 Encounters:   04/04/17 210 lb (95.3 kg)   01/31/17 203 lb 9.6 oz (92.4 kg)   11/10/16 200 lb (90.7 kg)              We Performed the Following     DIABETES EDUCATOR REFERRAL          Today's Medication Changes          These changes are accurate as of: 4/4/17  5:57 PM.  If you have any questions, ask your nurse or doctor.               Start taking these medicines.        Dose/Directions    ferric carboxymaltose 50 MG/ML injection   Commonly known as:  INJECTAFER   Used for:  Bariatric surgery status, Iron deficiency anemia, unspecified iron deficiency anemia type   Started by:  Karley Irene MD        Dose:  750 mg   Inject 15 mLs (750 mg) into the vein every 7 days for 2 doses INDICATION: IRON REPLACEMENT, ORAL THERAPY CONTRAINDICATED DUE TO GASTRIC BYPASS STATUS   Quantity:  30 mL   Refills:  0       Hydroxypropyl Methylcellulose Powd   Used for:  Diarrhea due to malabsorption   Started by:  Karley Irene MD        Dose:  1 capful   1 capful 2 times daily (before meals) INDICATION: FIBER SUPPLEMENT, TO ACHIEVE 1-2 SOFT BMs PER DAY   Quantity:  2500 g   Refills:  3       linagliptin-metFORMIN ER 5-1000 MG per tablet   Commonly known as:  JENTADUETO XR   Used for:  Type 2 diabetes mellitus with hyperglycemia, without long-term current use of insulin (H)   Replaces:  metFORMIN 500 MG 24 hr tablet   Started by:  Karley Irene MD        Dose:  1 tablet   Take 1 tablet by mouth daily with food INDICATION: TO TREAT DIABETES   Quantity:  30 tablet   Refills:  1       lisinopril 2.5 MG tablet   Commonly known as:   PRINIVIL/Zestril   Used for:  Type 2 diabetes mellitus with hyperglycemia, without long-term current use of insulin (H), Retinopathy due to secondary diabetes mellitus (H)   Replaces:  ACE/ARB NOT PRESCRIBED (INTENTIONAL)   Started by:  Karley Irene MD        Dose:  2.5 mg   Take 1 tablet (2.5 mg) by mouth every morning INDICATION:TO LOWER BLOOD PRESSURE AND TO PRESERVE KIDNEY FUNCTION   Quantity:  30 tablet   Refills:  prn       potassium bicarb & chloride 25 MEQ Tbef   Used for:  Uric acid kidney stone   Started by:  Karley Irene MD        Dose:  1 tablet   Take 1 tablet (25 mEq) by mouth daily INDICATION: KIDNEY STONES   Quantity:  90 tablet   Refills:  3       thiamine 100 MG tablet   Used for:  Thiamine deficiency   Started by:  Karley Irene MD        Dose:  100 mg   Take 1 tablet (100 mg) by mouth daily INDICATION: LOW VITAMIN B1   Quantity:  90 tablet   Refills:  prn         Stop taking these medicines if you haven't already. Please contact your care team if you have questions.     ACE/ARB NOT PRESCRIBED (INTENTIONAL)   Replaced by:  lisinopril 2.5 MG tablet   Stopped by:  Karley Irene MD           metFORMIN 500 MG 24 hr tablet   Commonly known as:  GLUCOPHAGE-XR   Replaced by:  linagliptin-metFORMIN ER 5-1000 MG per tablet   Stopped by:  Karley Irene MD                Where to get your medicines      These medications were sent to [address] [address]     Phone:  [phone]     calcium-vitamin D 600-400 MG-UNIT per tablet    Cyanocobalamin 5000 MCG/ML Liqd    Hydroxypropyl Methylcellulose Powd    linagliptin-metFORMIN ER 5-1000 MG per tablet    potassium bicarb & chloride 25 MEQ Tbef    pyridOXINE 25 MG tablet    thiamine 100 MG tablet         Some of these will need a paper prescription and others can be bought over the counter.  Ask your nurse if you have questions.     Bring a paper prescription  for each of these medications     ferric carboxymaltose 50 MG/ML injection    lisinopril 2.5 MG tablet                Primary Care Provider Office Phone # Fax #    Karley Irene [phone]       [facility] [address]  [facility] [address]        Thank you!     Thank you for choosing [facility]  for your care. Our goal is always to provide you with excellent care. Hearing back from our patients is one way we can continue to improve our services. Please take a few minutes to complete the written survey that you may receive in the mail after your visit with us. Thank you!             Your Updated Medication List - Protect others around you: Learn how to safely use, store and throw away your medicines at www.disposemymeds.org.          This list is accurate as of: 4/4/17  5:57 PM.  Always use your most recent med list.                   Brand Name Dispense Instructions for use    ACCU-CHEK UCHE PLUS test strip   Generic drug:  blood glucose monitoring          calcium-vitamin D 600-400 MG-UNIT per tablet    calcium 600 + D    100 tablet    Take 1 tablet by mouth daily with food FOR BONE HEALTH AND FOR VITAMIN D DEFICIENCY (LOW VITAMIN D)       Cyanocobalamin 5000 MCG/ML Liqd    B-12 SUPER STRENGTH    2 Bottle    Place 0.5 mLs under the tongue every morning FOR VITAMIN B12 SUPPLEMENTATION, PLEASE PLACE UNDER THE TONGUE       ferric carboxymaltose 50 MG/ML injection    INJECTAFER    30 mL    Inject 15 mLs (750 mg) into the vein every 7 days for 2 doses INDICATION: IRON REPLACEMENT, ORAL THERAPY CONTRAINDICATED DUE TO GASTRIC BYPASS STATUS       ferrous fumarate 65 mg ([place]. FE)-Vitamin C 125 mg  MG Tabs tablet    VITRON C    60 tablet    Take 1 tablet by mouth 2 times daily (before meals) INDICATION: IRON SUPPLEMENT       folic acid 1 MG tablet    FOLVITE    100 tablet    Take 1 tablet (1 mg) by mouth daily INDICATION: FOLIC ACID SUPPLEMENT        glimepiride 1 MG tablet    AMARYL    90 tablet    Take 1 tablet (1 mg) by mouth 2 times daily INDICATION: TO TREAT DIABETES       Hydroxypropyl Methylcellulose Powd     2500 g    1 capful 2 times daily (before meals) INDICATION: FIBER SUPPLEMENT, TO ACHIEVE 1-2 SOFT BMs PER DAY       linagliptin-metFORMIN ER 5-1000 MG per tablet    JENTADUETO XR    30 tablet    Take 1 tablet by mouth daily with food INDICATION: TO TREAT DIABETES       lisinopril 2.5 MG tablet    PRINIVIL/Zestril    30 tablet    Take 1 tablet (2.5 mg) by mouth every morning INDICATION:TO LOWER BLOOD PRESSURE AND TO PRESERVE KIDNEY FUNCTION       potassium bicarb & chloride 25 MEQ Tbef     90 tablet    Take 1 tablet (25 mEq) by mouth daily INDICATION: KIDNEY STONES       pyridOXINE 25 MG tablet    vitamin B-6    90 tablet    Take 1 tablet (25 mg) by mouth daily INDICATION: VITAMIN B6 DEFICIENCY       tamsulosin 0.4 MG capsule    FLOMAX    90 capsule    Take 1 capsule (0.4 mg) by mouth daily INDICATION: BPH       thiamine 100 MG tablet     90 tablet    Take 1 tablet (100 mg) by mouth daily INDICATION: LOW VITAMIN B1       Vitamin D3 49576 UNITS Tabs     90 tablet    Take 10,000 Units by mouth daily INDICATION:VITAMIN D DEFICIENCY (LOW VITAMIN D)

## 2017-04-04 NOTE — PROGRESS NOTES
SUBJECTIVE:                                                    Los Renee is a 66 year old male who presents to clinic today for the following health issues:      Patient here to follow-up on DEXA scan completed 2/8/2017. Patient would also like to discuss some possible side effects from the metformin.           MD NOTE:  PATIENT IS HERE TO TO FOLLOW UP ON DIABETES, KIDNEY STONES, VITAMIN DEFICIENCIES, AND TO REVIEW TEST RESULTS  Diabetes Follow-up      Patient is checking blood sugars: rarely.  Results range from 60 to 164s    Diabetic concerns: None and other - ONCE IN THE LAST FEW WEEKS     Symptoms of hypoglycemia (low blood sugar): shaky, dizzy, weak, lethargy, blurred vision, Symptoms occur if sugars < 70.     Paresthesias (numbness or burning in feet) or sores: No     Date of last diabetic eye exam: UTD     Hyperlipidemia Follow-Up      Rate your low fat/cholesterol diet?: good    Taking statin?  No    Other lipid medications/supplements?:  none     Not on ACEI/ARB ? reason    Problem list and histories reviewed & adjusted, as indicated.  Additional history: as documented    Labs reviewed in EPIC    Reviewed and updated as needed this visit by clinical staff  Tobacco  Allergies  Med Hx  Surg Hx  Fam Hx  Soc Hx      Reviewed and updated as needed this visit by Provider       He also has a history of recurrent kidney stones and pass the stone at home which he brings in for analysis today. He denies any genitourinary symptoms.      ROS:  14 point ROS negative except as above      OBJECTIVE:                                                    /76 (BP Location: Right arm, Patient Position: Chair, Cuff Size: Adult Regular)  Pulse 87  Temp 98  F (36.7  C) (Oral)  Wt 210 lb (95.3 kg)  SpO2 96%  BMI 32.41 kg/m2  Body mass index is 32.41 kg/(m^2).  GENERAL: healthy, alert and no distress  EYES: Eyes grossly normal to inspection, PERRL and conjunctivae and sclerae normal  NECK: no adenopathy, no  asymmetry, masses, or scars and thyroid normal to palpation  RESP: lungs clear to auscultation - no rales, rhonchi or wheezes  CV: regular rate and rhythm, normal S1 S2, no S3 or S4, no murmur, click or rub, no peripheral edema and peripheral pulses strong  ABDOMEN: soft, nontender, no hepatosplenomegaly, no masses and bowel sounds normal  MS: no gross musculoskeletal defects noted, no edema  NEURO: Normal strength and tone, mentation intact and speech normal  PSYCH: mentation appears normal, affect normal/bright    Diagnostic Test Results:  Results for orders placed or performed in visit on 17   DX Hip/Pelvis/Spine    Narrative    BONE DENSITOMETRY  [address] [address]  2017      PATIENT: Los Renee  CHART: [id number]   : 1950  AGE: 66 year old  SEX: male   REFERRING PROVIDER: Karley Irene MD     PROCEDURE: Bone density scanning was performed using DXA technology of the   lumbar spine and hip. Scanning was performed on a Lunar Prodig[employer] scanner.   Reporting is completed in the form of a T-score. The T-score represents   the standard deviation from peak bone mass based on a young healthy adult.     REFERENCE T-SCORES:   Normal -1.0 and greater   Osteopenia Between -1.0 and -2.5   Osteoporosis -2.5 and less       RISK FACTORS: Condition related to bone loss: gastric bypass  CURRENT TREATMENT: Calcium, Vitamin D     FINDINGS:  Lumbar Spine (L1-L4) T-score: 3.1  Left Femoral Neck   T-score: 2.1  Right Femoral Neck   T-score: 2.2  Lumbar (L1-L4) BMD: 1.622 Previous:   Total Hip Mean BMD: 1.446 Previous:     IMPRESSION  Normal bone mineral density study  Recommendations include ensuring adequate daily Calcium and Vitamin D   intake      Rudi Dozier MD           ASSESSMENT/PLAN:                                                    Diabetes Type II, A1c Controlled, non-insulin dependent   Associated with the following  complications    Renal Complications:  None    Ophthalmologic Complications: None    Neurologic Complications: None    Peripheral Vascular Complications:  None    Other: None   Plan:  See plan section    Hyperlipidemia; controlled   Plan:  No changes in the patient's current treatment plan    Hypertension; controlled   Associated with the following complications:    Diabetes   Plan:  No changes in the patient's current treatment plan    Chronic Kidney Disease Stage 3 , improved     Associated with the following complications: None     Plan:  Current treatment plan is appropriate, no change in therapy          DIAGNOSIS/PLAN:     ICD-10-CM    1. Type 2 diabetes mellitus with hyperglycemia, without long-term current use of insulin (H) E11.65 linagliptin-metFORMIN ER (JENTADUETO XR) 5-1000 MG per tablet     Comprehensive metabolic panel     Albumin Random Urine Quantitative     Hemoglobin A1c     lisinopril (PRINIVIL/ZESTRIL) 2.5 MG tablet     DIABETES EDUCATOR REFERRAL     PROLONGED SERV,OFFICE,1ST HR   2. Microalbuminuria due to type 2 diabetes mellitus (H) E11.29 DIABETES EDUCATOR REFERRAL    R80.9 PROLONGED SERV,OFFICE,1ST HR   3. 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 Retinopathy due to secondary diabetes mellitus (H) E13.319 lisinopril (PRINIVIL/ZESTRIL) 2.5 MG tablet     PROLONGED SERV,OFFICE,1ST HR   5. Hyperlipidemia LDL goal <70 E78.5 Comprehensive metabolic panel     PROLONGED SERV,OFFICE,1ST HR   6. Bariatric surgery status Z98.84 ferric carboxymaltose (INJECTAFER) 50 MG/ML injection     calcium-vitamin D (CALCIUM 600 + D) 600-400 MG-UNIT per tablet     Cyanocobalamin (B-12 SUPER STRENGTH) 5000 MCG/ML LIQD     pyridOXINE (VITAMIN  B-6) 25 MG tablet     Ferritin     CBC with platelets     Vitamin C     Comprehensive metabolic panel     Vitamin B12     Vitamin D Deficiency     Vitamin B1 whole blood     Vitamin B6     Iron and iron  binding capacity     PROLONGED SERV,OFFICE,1ST HR   7. Vitamin B12 deficiency (non anemic) E53.8 Cyanocobalamin (B-12 SUPER STRENGTH) 5000 MCG/ML LIQD     PROLONGED SERV,OFFICE,1ST HR   8. Vitamin D deficiency E55.9 calcium-vitamin D (CALCIUM 600 + D) 600-400 MG-UNIT per tablet     PROLONGED SERV,OFFICE,1ST HR   9. Uric acid kidney stone N20.0 potassium bicarb & chloride 25 MEQ TBEF     Comprehensive metabolic panel     PROLONGED SERV,OFFICE,1ST HR   10. Thiamine deficiency E51.9 thiamine 100 MG tablet     PROLONGED SERV,OFFICE,1ST HR   11. Iron deficiency anemia, unspecified iron deficiency anemia type D50.9 ferric carboxymaltose (INJECTAFER) 50 MG/ML injection     Ferritin     CBC with platelets     PROLONGED SERV,OFFICE,1ST HR   12. Bariatric surgery status Z98.84 ferric carboxymaltose (INJECTAFER) 50 MG/ML injection     calcium-vitamin D (CALCIUM 600 + D) 600-400 MG-UNIT per tablet     Cyanocobalamin (B-12 SUPER STRENGTH) 5000 MCG/ML LIQD     pyridOXINE (VITAMIN  B-6) 25 MG tablet     Ferritin     CBC with platelets     Vitamin C     Comprehensive metabolic panel     Vitamin B12     Vitamin D Deficiency     Vitamin B1 whole blood     Vitamin B6     Iron and iron binding capacity     PROLONGED SERV,OFFICE,1ST HR    WITH MILDLY LOW FOLATE   13. Recurrent kidney stones N20.0 calcium-vitamin D (CALCIUM 600 + D) 600-400 MG-UNIT per tablet     Comprehensive metabolic panel     PROLONGED SERV,OFFICE,1ST HR   14. Diarrhea due to malabsorption K90.9 PROLONGED SERV,OFFICE,1ST HR    R19.7 DISCONTINUED: Hypromellose (HYDROXYPROPYL METHYLCELLULOSE) POWD   15. Vitamin C deficiency E54 Vitamin C     PROLONGED SERV,OFFICE,1ST HR       SIGNIFICANT ISSUES RE The primary encounter diagnosis was Type 2 diabetes mellitus with hyperglycemia, without long-term current use of insulin (H). Diagnoses of Microalbuminuria due to type 2 diabetes mellitus (H), Vitamin B6 induced neuropathy (H), Retinopathy due to secondary diabetes mellitus  (H), Hyperlipidemia LDL goal <70, Bariatric surgery status, Vitamin B12 deficiency (non anemic), Vitamin D deficiency, Uric acid kidney stone, Thiamine deficiency, Iron deficiency anemia, unspecified iron deficiency anemia type, Bariatric surgery status, Recurrent kidney stones, Diarrhea due to malabsorption, and Vitamin C deficiency were also pertinent to this visit. AS NOTED AND ADDRESSED ABOVE   MEDS AND AND LABS AS ORDERED TO ADDRESS PREVIOUS AND CURRENT ABNORMAL INDICES    SEE PATIENT INSTRUCTION SECTION FOR FOLLOW UP PLAN    Los IS TO CONTINUE OTHER TREATMENT REGIMEN/PLANS EXCEPT AS INDICATED    COMPLIANCE WITH MEDICATIONS DIET AND EXERCISE PLANS ENCOURAGED    DISCONTINUED MEDS:  Medications Discontinued During This Encounter   Medication Reason     calcium-vitamin D (CALCIUM 600 + D) 600-400 MG-UNIT per tablet Reorder     Cyanocobalamin (B-12 SUPER STRENGTH) 5000 MCG/ML LIQD Reorder     pyridOXINE (VITAMIN  B-6) 25 MG tablet Reorder     metFORMIN (GLUCOPHAGE-XR) 500 MG 24 hr tablet Reorder     ACE/ARB NOT PRESCRIBED, INTENTIONAL, Reorder       CURRENT MED LIST WITH CHANGES AS NOTED BELOW:  Current Outpatient Prescriptions   Medication Sig Dispense Refill     thiamine 100 MG tablet Take 1 tablet (100 mg) by mouth daily INDICATION: LOW VITAMIN B1 90 tablet prn     potassium bicarb & chloride 25 MEQ TBEF Take 1 tablet (25 mEq) by mouth daily INDICATION: KIDNEY STONES 90 tablet 3     calcium-vitamin D (CALCIUM 600 + D) 600-400 MG-UNIT per tablet Take 1 tablet by mouth daily with food FOR BONE HEALTH AND FOR VITAMIN D DEFICIENCY (LOW VITAMIN D) 100 tablet PRN     Cyanocobalamin (B-12 SUPER STRENGTH) 5000 MCG/ML LIQD Place 0.5 mLs under the tongue every morning FOR VITAMIN B12 SUPPLEMENTATION, PLEASE PLACE UNDER THE TONGUE 2 Bottle PRN     pyridOXINE (VITAMIN  B-6) 25 MG tablet Take 1 tablet (25 mg) by mouth daily INDICATION: VITAMIN B6 DEFICIENCY 90 tablet 2     linagliptin-metFORMIN ER (JENTADUETO XR) 5-1000 MG  per tablet Take 1 tablet by mouth daily with food INDICATION: TO TREAT DIABETES 30 tablet 1     lisinopril (PRINIVIL/ZESTRIL) 2.5 MG tablet Take 1 tablet (2.5 mg) by mouth every morning INDICATION:TO LOWER BLOOD PRESSURE AND TO PRESERVE KIDNEY FUNCTION 30 tablet prn     ACCU-CHEK UCHE PLUS test strip        glimepiride (AMARYL) 1 MG tablet Take 1 tablet (1 mg) by mouth 2 times daily INDICATION: TO TREAT DIABETES 90 tablet 3     Cholecalciferol (VITAMIN D3) 84319 UNITS TABS Take 10,000 Units by mouth daily INDICATION:VITAMIN D DEFICIENCY (LOW VITAMIN D) 90 tablet 3     ferrous fumarate 65 mg, [place]. FE,-Vitamin C 125 mg (VITRON C)  MG TABS tablet Take 1 tablet by mouth 2 times daily (before meals) INDICATION: IRON SUPPLEMENT 60 tablet prn     tamsulosin (FLOMAX) 0.4 MG capsule Take 1 capsule (0.4 mg) by mouth daily INDICATION: BPH 90 capsule 3     folic acid (FOLVITE) 1 MG tablet Take 1 tablet (1 mg) by mouth daily INDICATION: FOLIC ACID SUPPLEMENT 100 tablet 3     psyllium (METAMUCIL/KONSYL) Packet Take 1 packet by mouth 2 times daily INDICATION: FIBER SUPPLEMENT  TO ACHIEVE 1-2 SOFT BMs PER DAY, ALSO HELPS LOWER CHOLESTEROL 180 packet          Office visit time > 40 mins, greater than 50% of which was spent obtaining history, reviewing medications, counseling re compliance with treatment plan, discussion of treatment, follow up plans, and coordination of care.       Patient Instructions   ** FOLLOW UP PLAN**:    PLEASE SCHEDULE FASTING LABS WITH OFFICE VISIT 3 MONTHS FROM TODAY TO FOLLOW UP ON  DIABETES, KIDNEY STONES, BLOOD PRESSURE, OTHER MEDICAL ISSUES, AND TO REVIEW TEST RESULTS     BE SURE TO SCHEDULE YOUR LAB DRAW APPOINTMENT FOR AT LEAST 1 WEEK BEFORE YOUR NEXT VISIT    I HAVE ALSO REFERRED YOU FOR  IV IRON INFUSION DUE TO LOW IRON UNRESPONSIVE TO ORAL SUPPLEMENTS , AND DIABETES EDUCATION TO HELP WITH DIETARY AND LIFESTYLE MANAGEMENT OF DIABETES     PLEASE FOLLOW-UP ON YOUR KIDNEY DOCTOR AND UROLOGY  APPOINTMENTS    I RECOMMEND THAT YOU CHECK YOUR BLOOD SUGARS BEFORE BREAKFAST, 2 HOURS AFTER LUNCH, BEFORE SUPPERTIME, AND BEFORE BEDTIME    YOU MAY CONTACT THE CLINIC IF ANY QUESTIONS OR CONCERNS -[phone] OR VIA ROSALINDA Irene MD  [facility]

## 2017-04-04 NOTE — PATIENT INSTRUCTIONS
** FOLLOW UP PLAN**:    PLEASE SCHEDULE FASTING LABS WITH OFFICE VISIT 3 MONTHS FROM TODAY TO FOLLOW UP ON  DIABETES, KIDNEY STONES, BLOOD PRESSURE, OTHER MEDICAL ISSUES, AND TO REVIEW TEST RESULTS     BE SURE TO SCHEDULE YOUR LAB DRAW APPOINTMENT FOR AT LEAST 1 WEEK BEFORE YOUR NEXT VISIT    I HAVE ALSO REFERRED YOU FOR  IV IRON INFUSION DUE TO LOW IRON UNRESPONSIVE TO ORAL SUPPLEMENTS , AND DIABETES EDUCATION TO HELP WITH DIETARY AND LIFESTYLE MANAGEMENT OF DIABETES     PLEASE FOLLOW-UP ON YOUR KIDNEY DOCTOR AND UROLOGY APPOINTMENTS    I RECOMMEND THAT YOU CHECK YOUR BLOOD SUGARS BEFORE BREAKFAST, 2 HOURS AFTER LUNCH, BEFORE SUPPERTIME, AND BEFORE BEDTIME    YOU MAY CONTACT THE CLINIC IF ANY QUESTIONS OR CONCERNS -[phone] OR VIA [employer]

## 2017-04-05 ENCOUNTER — TELEPHONE (OUTPATIENT)
Dept: INTERNAL MEDICINE | Facility: CLINIC | Age: 67
End: 2017-04-05

## 2017-04-05 DIAGNOSIS — R19.7 DIARRHEA DUE TO MALABSORPTION: ICD-10-CM

## 2017-04-05 DIAGNOSIS — K90.9 DIARRHEA DUE TO MALABSORPTION: ICD-10-CM

## 2017-04-05 NOTE — TELEPHONE ENCOUNTER
[facility] called with questions RE:  Hypromellose (HYDROXYPROPYL METHYLCELLULOSE) POWD- pharmacist states this a filler for eyedrops- not sure if this was ordered correctly?  ALSO:  potassium bicarb & chloride 25 MEQ TBEF- Pharmacists is not sure what this is.      Please call pharmacy at [phone] with clarification

## 2017-04-06 NOTE — TELEPHONE ENCOUNTER
First medication is fiber powder which patient actually gets over-the-counter and the second medication the potassium bicarbonate and chloride has also been located by the pharmacist. Issues clarified.

## 2017-04-10 DIAGNOSIS — Z87.19 PERSONAL HISTORY OF DIGESTIVE DISEASE: ICD-10-CM

## 2017-04-10 DIAGNOSIS — Z98.84 BARIATRIC SURGERY STATUS: Primary | ICD-10-CM

## 2017-04-10 DIAGNOSIS — K91.2 MALNUTRITION FOLLOWING GASTROINTESTINAL SURGERY: ICD-10-CM

## 2017-04-10 DIAGNOSIS — K90.9 MALABSORPTION: ICD-10-CM

## 2017-04-10 DIAGNOSIS — D50.9 IRON DEFICIENCY ANEMIA, UNSPECIFIED IRON DEFICIENCY ANEMIA TYPE: ICD-10-CM

## 2017-04-13 ENCOUNTER — TRANSFERRED RECORDS (OUTPATIENT)
Dept: HEALTH INFORMATION MANAGEMENT | Facility: CLINIC | Age: 67
End: 2017-04-13

## 2017-04-20 ENCOUNTER — INFUSION THERAPY VISIT (OUTPATIENT)
Dept: INFUSION THERAPY | Facility: CLINIC | Age: 67
End: 2017-04-20
Attending: INTERNAL MEDICINE
Payer: COMMERCIAL

## 2017-04-20 VITALS
RESPIRATION RATE: 16 BRPM | HEART RATE: 98 BPM | SYSTOLIC BLOOD PRESSURE: 122 MMHG | TEMPERATURE: 97.8 F | DIASTOLIC BLOOD PRESSURE: 63 MMHG

## 2017-04-20 DIAGNOSIS — K90.9 MALABSORPTION: ICD-10-CM

## 2017-04-20 DIAGNOSIS — Z98.84 BARIATRIC SURGERY STATUS: ICD-10-CM

## 2017-04-20 DIAGNOSIS — D50.9 IRON DEFICIENCY ANEMIA, UNSPECIFIED IRON DEFICIENCY ANEMIA TYPE: ICD-10-CM

## 2017-04-20 DIAGNOSIS — Z87.19 PERSONAL HISTORY OF DIGESTIVE DISEASE: ICD-10-CM

## 2017-04-20 DIAGNOSIS — K91.2 MALNUTRITION FOLLOWING GASTROINTESTINAL SURGERY: Primary | ICD-10-CM

## 2017-04-20 PROCEDURE — 96374 THER/PROPH/DIAG INJ IV PUSH: CPT

## 2017-04-20 PROCEDURE — 25000128 H RX IP 250 OP 636: Performed by: INTERNAL MEDICINE

## 2017-04-20 RX ADMIN — FERRIC CARBOXYMALTOSE INJECTION 750 MG: 50 INJECTION, SOLUTION INTRAVENOUS at 14:37

## 2017-04-20 RX ADMIN — SODIUM CHLORIDE 250 ML: 9 INJECTION, SOLUTION INTRAVENOUS at 14:37

## 2017-04-20 ASSESSMENT — PAIN SCALES - GENERAL: PAINLEVEL: NO PAIN (0)

## 2017-04-20 NOTE — PROGRESS NOTES
Infusion Nursing Note:  Los Renee presents today for Injectafer  Patient seen by provider today: No   present during visit today: Not Applicable.    Note: This is patients first dose of Injectafer, new medication teaching done.     Intravenous Access:  Peripheral IV placed.    Treatment Conditions:  Not Applicable.      Post Infusion Assessment:  Patient tolerated infusion without incident.  Patient observed for 30 minutes post Injectafer per protocol.  Site patent and intact, free from redness, edema or discomfort.  Access discontinued per protocol.    Discharge Plan:   Discharge instructions reviewed with: Patient.  Patient and/or family verbalized understanding of discharge instructions and all questions answered.  AVS to patient via [email or web address].  Patient will return one week for next appointment.   Patient discharged in stable condition accompanied by: self.    Angy Lindo RN

## 2017-04-20 NOTE — MR AVS SNAPSHOT
After Visit Summary   4/20/2017    Los Renee    MRN: [id number]           Patient Information     Date Of Birth          1950        Visit Information        Provider Department      4/20/2017 2:30 PM  INFUSION CHAIR 12 [facility]        Today's Diagnoses     Malnutrition following gastrointestinal surgery    -  1    Malabsorption        Iron and its compounds causing adverse effect in therapeutic use, initial encounter        Personal history of digestive disease        Bariatric surgery status        Iron deficiency anemia, unspecified iron deficiency anemia type           Follow-ups after your visit        Your next 10 appointments already scheduled     Apr 29, 2017 11:00 AM CDT   Level 2 with  INFUSION CHAIR 15   [facility] ([facility])    [facility]  [address]  [facility] [address] [phone]            Jul 06, 2017  5:30 PM CDT   SHORT with Karley Irene MD   [facility]    [address] [address] [phone]              Who to contact     If you have questions or need follow up information about today's clinic visit or your schedule please contact [facility] [place] [facility] [place] directly at [phone].  Normal or non-critical lab and imaging results will be communicated to you by MyChart, letter or phone within 4 business days after the clinic has received the results. If you do not hear from us within 7 days, please contact the clinic through MyChart or phone. If you have a critical or abnormal lab result, we will notify you by phone as soon as possible.  Submit refill requests through [employer] or call your pharmacy and they will forward the refill request to us. Please allow 3 business days for your refill to be completed.          Additional Information  About Your Visit        [facility] Information     [employer] gives you secure access to your electronic health record. If you see a primary care provider, you can also send messages to your care team and make appointments. If you have questions, please call your primary care clinic.  If you do not have a primary care provider, please call [phone] and they will assist you.        Care EveryWhere ID     This is your Care EveryWhere ID. This could be used by other organizations to access your [facility] medical records  [id number]        Your Vitals Were     Pulse Temperature Respirations             98 97.8  F (36.6  C) (Oral) 16          Blood Pressure from Last 3 Encounters:   04/20/17 122/63   04/04/17 128/76   01/31/17 122/66    Weight from Last 3 Encounters:   04/04/17 95.3 kg (210 lb)   01/31/17 92.4 kg (203 lb 9.6 oz)   11/10/16 90.7 kg (200 lb)              We Performed the Following     Treatment Conditions        Primary Care Provider Office Phone # Fax #    Karley Irene [phone]       [facility] [address] [facility] [address]        Thank you!     Thank you for choosing [facility] AND [facility]  for your care. Our goal is always to provide you with excellent care. Hearing back from our patients is one way we can continue to improve our services. Please take a few minutes to complete the written survey that you may receive in the mail after your visit with us. Thank you!             Your Updated Medication List - Protect others around you: Learn how to safely use, store and throw away your medicines at www.disposemymeds.org.          This list is accurate as of: 4/20/17  4:26 PM.  Always use your most recent med list.                   Brand Name Dispense Instructions for use    ACCU-CHEK UCHE PLUS test strip   Generic drug:  blood glucose monitoring          calcium-vitamin D 600-400 MG-UNIT per tablet    calcium 600 + D    100 tablet    Take 1  tablet by mouth daily with food FOR BONE HEALTH AND FOR VITAMIN D DEFICIENCY (LOW VITAMIN D)       Cyanocobalamin 5000 MCG/ML Liqd    B-12 SUPER STRENGTH    2 Bottle    Place 0.5 mLs under the tongue every morning FOR VITAMIN B12 SUPPLEMENTATION, PLEASE PLACE UNDER THE TONGUE       ferrous fumarate 65 mg ([place]. FE)-Vitamin C 125 mg  MG Tabs tablet    VITRON C    60 tablet    Take 1 tablet by mouth 2 times daily (before meals) INDICATION: IRON SUPPLEMENT       folic acid 1 MG tablet    FOLVITE    100 tablet    Take 1 tablet (1 mg) by mouth daily INDICATION: FOLIC ACID SUPPLEMENT       glimepiride 1 MG tablet    AMARYL    90 tablet    Take 1 tablet (1 mg) by mouth 2 times daily INDICATION: TO TREAT DIABETES       linagliptin-metFORMIN ER 5-1000 MG per tablet    JENTADUETO XR    30 tablet    Take 1 tablet by mouth daily with food INDICATION: TO TREAT DIABETES       lisinopril 2.5 MG tablet    PRINIVIL/Zestril    30 tablet    Take 1 tablet (2.5 mg) by mouth every morning INDICATION:TO LOWER BLOOD PRESSURE AND TO PRESERVE KIDNEY FUNCTION       potassium bicarb & chloride 25 MEQ Tbef     90 tablet    Take 1 tablet (25 mEq) by mouth daily INDICATION: KIDNEY STONES       psyllium Packet    METAMUCIL/KONSYL    180 packet    Take 1 packet by mouth 2 times daily INDICATION: FIBER SUPPLEMENT  TO ACHIEVE 1-2 SOFT BMs PER DAY, ALSO HELPS LOWER CHOLESTEROL       pyridOXINE 25 MG tablet    vitamin B-6    90 tablet    Take 1 tablet (25 mg) by mouth daily INDICATION: VITAMIN B6 DEFICIENCY       tamsulosin 0.4 MG capsule    FLOMAX    90 capsule    Take 1 capsule (0.4 mg) by mouth daily INDICATION: BPH       thiamine 100 MG tablet     90 tablet    Take 1 tablet (100 mg) by mouth daily INDICATION: LOW VITAMIN B1       Vitamin D3 38202 UNITS Tabs     90 tablet    Take 10,000 Units by mouth daily INDICATION:VITAMIN D DEFICIENCY (LOW VITAMIN D)

## 2017-04-29 ENCOUNTER — INFUSION THERAPY VISIT (OUTPATIENT)
Dept: INFUSION THERAPY | Facility: CLINIC | Age: 67
End: 2017-04-29
Attending: INTERNAL MEDICINE
Payer: COMMERCIAL

## 2017-04-29 VITALS
HEART RATE: 79 BPM | RESPIRATION RATE: 18 BRPM | SYSTOLIC BLOOD PRESSURE: 121 MMHG | TEMPERATURE: 97.9 F | DIASTOLIC BLOOD PRESSURE: 61 MMHG

## 2017-04-29 DIAGNOSIS — K91.2 MALNUTRITION FOLLOWING GASTROINTESTINAL SURGERY: Primary | ICD-10-CM

## 2017-04-29 DIAGNOSIS — Z87.19 PERSONAL HISTORY OF DIGESTIVE DISEASE: ICD-10-CM

## 2017-04-29 DIAGNOSIS — Z98.84 BARIATRIC SURGERY STATUS: ICD-10-CM

## 2017-04-29 DIAGNOSIS — D50.9 IRON DEFICIENCY ANEMIA, UNSPECIFIED IRON DEFICIENCY ANEMIA TYPE: ICD-10-CM

## 2017-04-29 DIAGNOSIS — K90.9 MALABSORPTION: ICD-10-CM

## 2017-04-29 PROCEDURE — 96365 THER/PROPH/DIAG IV INF INIT: CPT

## 2017-04-29 PROCEDURE — 25000128 H RX IP 250 OP 636: Performed by: INTERNAL MEDICINE

## 2017-04-29 RX ADMIN — FERRIC CARBOXYMALTOSE INJECTION 750 MG: 50 INJECTION, SOLUTION INTRAVENOUS at 10:39

## 2017-04-29 RX ADMIN — SODIUM CHLORIDE 250 ML: 9 INJECTION, SOLUTION INTRAVENOUS at 10:39

## 2017-04-29 NOTE — MR AVS SNAPSHOT
After Visit Summary   4/29/2017    Los Renee    MRN: [id number]           Patient Information     Date Of Birth          1950        Visit Information        Provider Department      4/29/2017 11:00 AM  INFUSION CHAIR [facility]        Today's Diagnoses     Malnutrition following gastrointestinal surgery    -  1    Malabsorption        Iron and its compounds causing adverse effect in therapeutic use, initial encounter        Personal history of digestive disease        Bariatric surgery status        Iron deficiency anemia, unspecified iron deficiency anemia type           Follow-ups after your visit        Your next 10 appointments already scheduled     Jul 06, 2017  5:30 PM CDT   SHORT with Karley Irene MD   [facility] ([facility])    [address] [address] [phone]              Who to contact     If you have questions or need follow up information about today's clinic visit or your schedule please contact [facility] directly at [phone].  Normal or non-critical lab and imaging results will be communicated to you by [employer], letter or phone within 4 business days after the clinic has received the results. If you do not hear from us within 7 days, please contact the clinic through [employer] or phone. If you have a critical or abnormal lab result, we will notify you by phone as soon as possible.  Submit refill requests through [employer] or call your pharmacy and they will forward the refill request to us. Please allow 3 business days for your refill to be completed.          Additional Information About Your Visit        [facility] Information     [employer] gives you secure access to your electronic health record. If you see a primary care provider, you can also send messages to your care team and make appointments. If you have questions,  please call your primary care clinic.  If you do not have a primary care provider, please call [phone] and they will assist you.        Care EveryWhere ID     This is your Care EveryWhere ID. This could be used by other organizations to access your [facility] medical records  [id number]        Your Vitals Were     Pulse Temperature Respirations             79 97.9  F (36.6  C) (Oral) 18          Blood Pressure from Last 3 Encounters:   04/29/17 121/61   04/20/17 122/63   04/04/17 128/76    Weight from Last 3 Encounters:   04/04/17 95.3 kg (210 lb)   01/31/17 92.4 kg (203 lb 9.6 oz)   11/10/16 90.7 kg (200 lb)              We Performed the Following     Road Map Alert     Treatment Conditions        Primary Care Provider Office Phone # Fax #    Karley Irene [phone]       [facility] [address] [facility] [address]        Thank you!     Thank you for choosing [facility]  for your care. Our goal is always to provide you with excellent care. Hearing back from our patients is one way we can continue to improve our services. Please take a few minutes to complete the written survey that you may receive in the mail after your visit with us. Thank you!             Your Updated Medication List - Protect others around you: Learn how to safely use, store and throw away your medicines at www.disposemymeds.org.          This list is accurate as of: 4/29/17 11:29 AM.  Always use your most recent med list.                   Brand Name Dispense Instructions for use    ACCU-CHEK UCHE PLUS test strip   Generic drug:  blood glucose monitoring          calcium-vitamin D 600-400 MG-UNIT per tablet    calcium 600 + D    100 tablet    Take 1 tablet by mouth daily with food FOR BONE HEALTH AND FOR VITAMIN D DEFICIENCY (LOW VITAMIN D)       Cyanocobalamin 5000 MCG/ML Liqd    B-12 SUPER STRENGTH    2 Bottle    Place 0.5 mLs under the tongue every morning FOR  VITAMIN B12 SUPPLEMENTATION, PLEASE PLACE UNDER THE TONGUE       ferrous fumarate 65 mg ([place]. FE)-Vitamin C 125 mg  MG Tabs tablet    VITRON C    60 tablet    Take 1 tablet by mouth 2 times daily (before meals) INDICATION: IRON SUPPLEMENT       folic acid 1 MG tablet    FOLVITE    100 tablet    Take 1 tablet (1 mg) by mouth daily INDICATION: FOLIC ACID SUPPLEMENT       glimepiride 1 MG tablet    AMARYL    90 tablet    Take 1 tablet (1 mg) by mouth 2 times daily INDICATION: TO TREAT DIABETES       linagliptin-metFORMIN ER 5-1000 MG per tablet    JENTADUETO XR    30 tablet    Take 1 tablet by mouth daily with food INDICATION: TO TREAT DIABETES       lisinopril 2.5 MG tablet    PRINIVIL/Zestril    30 tablet    Take 1 tablet (2.5 mg) by mouth every morning INDICATION:TO LOWER BLOOD PRESSURE AND TO PRESERVE KIDNEY FUNCTION       potassium bicarb & chloride 25 MEQ Tbef     90 tablet    Take 1 tablet (25 mEq) by mouth daily INDICATION: KIDNEY STONES       psyllium Packet    METAMUCIL/KONSYL    180 packet    Take 1 packet by mouth 2 times daily INDICATION: FIBER SUPPLEMENT  TO ACHIEVE 1-2 SOFT BMs PER DAY, ALSO HELPS LOWER CHOLESTEROL       pyridOXINE 25 MG tablet    vitamin B-6    90 tablet    Take 1 tablet (25 mg) by mouth daily INDICATION: VITAMIN B6 DEFICIENCY       tamsulosin 0.4 MG capsule    FLOMAX    90 capsule    Take 1 capsule (0.4 mg) by mouth daily INDICATION: BPH       thiamine 100 MG tablet     90 tablet    Take 1 tablet (100 mg) by mouth daily INDICATION: LOW VITAMIN B1       Vitamin D3 98070 UNITS Tabs     90 tablet    Take 10,000 Units by mouth daily INDICATION:VITAMIN D DEFICIENCY (LOW VITAMIN D)

## 2017-04-29 NOTE — PROGRESS NOTES
Infusion Nursing Note:  Los Renee presents today for INjectafer.    Patient seen by provider today: No   present during visit today: Not Applicable.    Note: N/A.    Intravenous Access:  Peripheral IV placed.    Treatment Conditions:  Not Applicable.      Post Infusion Assessment:  Patient tolerated infusion without incident.  Pt observed for 30min post infusion per protocol.   Blood return noted pre and post infusion.  Site patent and intact, free from redness, edema or discomfort.  No evidence of extravasations.  Access discontinued per protocol.    Discharge Plan:   AVS to patient via MYCHART. Patient discharged in stable condition accompanied by: self.  Departure Mode: Ambulatory.    Rosa Herbert RN

## 2017-06-05 DIAGNOSIS — E11.65 TYPE 2 DIABETES MELLITUS WITH HYPERGLYCEMIA, WITHOUT LONG-TERM CURRENT USE OF INSULIN (H): ICD-10-CM

## 2017-06-06 DIAGNOSIS — E11.65 TYPE 2 DIABETES MELLITUS WITH HYPERGLYCEMIA, WITHOUT LONG-TERM CURRENT USE OF INSULIN (H): ICD-10-CM

## 2017-06-06 RX ORDER — LINAGLIPTIN AND METFORMIN HYDROCHLORIDE 5; 1000 MG/1; MG/1
TABLET, FILM COATED, EXTENDED RELEASE ORAL
Qty: 30 TABLET | Refills: 0 | Status: SHIPPED | OUTPATIENT
Start: 2017-06-06 | End: 2017-07-08

## 2017-06-06 NOTE — TELEPHONE ENCOUNTER
"OV Notes from 4/4/17:  \"PLEASE SCHEDULE FASTING LABS WITH OFFICE VISIT 3 MONTHS FROM TODAY TO FOLLOW UP ON  DIABETES, KIDNEY STONES, BLOOD PRESSURE, OTHER MEDICAL ISSUES, AND TO REVIEW TEST RESULTS      BE SURE TO SCHEDULE YOUR LAB DRAW APPOINTMENT FOR AT LEAST 1 WEEK BEFORE YOUR NEXT VISIT\"    Pt is DUE for 3 mo f/u on 7/4/17.  Medication is being filled for 1 time refill only due to:  Patient needs labs and office visit scheduled in 1 month.    linagliptin-metFORMIN ER (JENTADUETO XR) 5-1000 MG per tablet    BP Readings from Last 3 Encounters:   04/29/17 121/61   04/20/17 122/63   04/04/17 128/76     Lab Results   Component Value Date    MICROL 48 01/31/2017     Lab Results   Component Value Date    UMALCR 37.19 01/31/2017     Creatinine   Date Value Ref Range Status   01/31/2017 1.12 0.66 - 1.25 mg/dL Final   ]  GFR Estimate   Date Value Ref Range Status   01/31/2017 66 >60 mL/min/1.7m2 Final     Comment:     Non  GFR Calc   11/08/2016 65 >60 mL/min/1.7m2 Final     Comment:     Non  GFR Calc   10/05/2016 56 (L) >60 mL/min/1.7m2 Final     GFR Estimate If Black   Date Value Ref Range Status   01/31/2017 80 >60 mL/min/1.7m2 Final     Comment:      GFR Calc   11/08/2016 78 >60 mL/min/1.7m2 Final     Comment:      GFR Calc   10/05/2016 67 >60 mL/min/1.7m2 Final     Lab Results   Component Value Date    CHOL 152 01/31/2017     Lab Results   Component Value Date    HDL 34 01/31/2017     Lab Results   Component Value Date    LDL 84 01/31/2017     Lab Results   Component Value Date    TRIG 169 01/31/2017     Lab Results   Component Value Date    CHOLHDLRATIO 4.2 09/30/2010     Lab Results   Component Value Date    AST 14 01/31/2017     Lab Results   Component Value Date    ALT 29 01/31/2017     Lab Results   Component Value Date    A1C 7.3 01/31/2017    A1C 7.3 09/28/2016    A1C 8.7 09/30/2010    A1C 7.9 02/18/2010    A1C 7.5 11/24/2009     Potassium   Date " Value Ref Range Status   01/31/2017 3.9 3.4 - 5.3 mmol/L Final

## 2017-06-06 NOTE — TELEPHONE ENCOUNTER
jentadueto      Last Written Prescription Date:  na  Last Fill Quantity: na,   # refills: na  Last Office Visit with [facility], [facility] or [facility] prescribing provider: 04/04/17  Future Office visit:  07/06/17  Next 5 appointments (look out 90 days)     Jul 06, 2017  5:30 PM CDT   SHORT with Karley Irene MD   [facility] ([facility])    [address] [id number] [phone]                   Routing refill request to provider for review/approval because:  Drug not active on patient's medication list

## 2017-06-07 RX ORDER — LINAGLIPTIN AND METFORMIN HYDROCHLORIDE 5; 1000 MG/1; MG/1
TABLET, FILM COATED, EXTENDED RELEASE ORAL
Qty: 30 TABLET | Refills: 1 | OUTPATIENT
Start: 2017-06-07

## 2017-07-08 DIAGNOSIS — E11.65 TYPE 2 DIABETES MELLITUS WITH HYPERGLYCEMIA, WITHOUT LONG-TERM CURRENT USE OF INSULIN (H): ICD-10-CM

## 2017-07-11 RX ORDER — LINAGLIPTIN AND METFORMIN HYDROCHLORIDE 5; 1000 MG/1; MG/1
TABLET, FILM COATED, EXTENDED RELEASE ORAL
Qty: 30 TABLET | Refills: 0 | Status: SHIPPED | OUTPATIENT
Start: 2017-07-11 | End: 2017-08-21

## 2017-08-01 DIAGNOSIS — E11.65 TYPE 2 DIABETES MELLITUS WITH HYPERGLYCEMIA, WITHOUT LONG-TERM CURRENT USE OF INSULIN (H): ICD-10-CM

## 2017-08-03 RX ORDER — LINAGLIPTIN AND METFORMIN HYDROCHLORIDE 5; 1000 MG/1; MG/1
TABLET, FILM COATED, EXTENDED RELEASE ORAL
Qty: 30 TABLET | Refills: 0 | OUTPATIENT
Start: 2017-08-03

## 2017-08-03 RX ORDER — GLIMEPIRIDE 1 MG/1
TABLET ORAL
Qty: 90 TABLET | Refills: 3 | OUTPATIENT
Start: 2017-08-03

## 2017-08-03 NOTE — TELEPHONE ENCOUNTER
glimepiride         Last Written Prescription Date: 01/31/17  Last Fill Quantity: 90, # refills: 3  Last Office Visit with [facility], [facility] or  Health prescribing provider:  04/04/17   Next 5 appointments (look out 90 days)     Aug 24, 2017  5:00 PM CDT   SHORT with Karley Irene MD   [facility] ([facility])    [address] [address] [phone]                   BP Readings from Last 3 Encounters:   04/29/17 121/61   04/20/17 122/63   04/04/17 128/76     Lab Results   Component Value Date    MICROL 48 01/31/2017     Lab Results   Component Value Date    UMALCR 37.19 01/31/2017     Creatinine   Date Value Ref Range Status   01/31/2017 1.12 0.66 - 1.25 mg/dL Final   ]  GFR Estimate   Date Value Ref Range Status   01/31/2017 66 >60 mL/min/1.7m2 Final     Comment:     Non  GFR Calc   11/08/2016 65 >60 mL/min/1.7m2 Final     Comment:     Non  GFR Calc   10/05/2016 56 (L) >60 mL/min/1.7m2 Final     GFR Estimate If Black   Date Value Ref Range Status   01/31/2017 80 >60 mL/min/1.7m2 Final     Comment:      GFR Calc   11/08/2016 78 >60 mL/min/1.7m2 Final     Comment:      GFR Calc   10/05/2016 67 >60 mL/min/1.7m2 Final     Lab Results   Component Value Date    CHOL 152 01/31/2017     Lab Results   Component Value Date    HDL 34 01/31/2017     Lab Results   Component Value Date    LDL 84 01/31/2017     Lab Results   Component Value Date    TRIG 169 01/31/2017     Lab Results   Component Value Date    CHOLHDLRATIO 4.2 09/30/2010     Lab Results   Component Value Date    AST 14 01/31/2017     Lab Results   Component Value Date    ALT 29 01/31/2017     Lab Results   Component Value Date    A1C 7.3 01/31/2017    A1C 7.3 09/28/2016    A1C 8.7 09/30/2010    A1C 7.9 02/18/2010    A1C 7.5 11/24/2009     Potassium   Date Value Ref Range Status   01/31/2017 3.9 3.4 - 5.3 mmol/L Final      jentadueto         Last Written Prescription Date: 07/11/17  Last Fill Quantity: 30, # refills: 0  Last Office Visit with [facility], [facility] or  Health prescribing provider:  04/04/17   Next 5 appointments (look out 90 days)     Aug 24, 2017  5:00 PM CDT   SHORT with Karley Irene MD   [facility] ([facility])    [address] [address] [phone]                   BP Readings from Last 3 Encounters:   04/29/17 121/61   04/20/17 122/63   04/04/17 128/76     Lab Results   Component Value Date    MICROL 48 01/31/2017     Lab Results   Component Value Date    UMALCR 37.19 01/31/2017     Creatinine   Date Value Ref Range Status   01/31/2017 1.12 0.66 - 1.25 mg/dL Final   ]  GFR Estimate   Date Value Ref Range Status   01/31/2017 66 >60 mL/min/1.7m2 Final     Comment:     Non  GFR Calc   11/08/2016 65 >60 mL/min/1.7m2 Final     Comment:     Non  GFR Calc   10/05/2016 56 (L) >60 mL/min/1.7m2 Final     GFR Estimate If Black   Date Value Ref Range Status   01/31/2017 80 >60 mL/min/1.7m2 Final     Comment:      GFR Calc   11/08/2016 78 >60 mL/min/1.7m2 Final     Comment:      GFR Calc   10/05/2016 67 >60 mL/min/1.7m2 Final     Lab Results   Component Value Date    CHOL 152 01/31/2017     Lab Results   Component Value Date    HDL 34 01/31/2017     Lab Results   Component Value Date    LDL 84 01/31/2017     Lab Results   Component Value Date    TRIG 169 01/31/2017     Lab Results   Component Value Date    CHOLHDLRATIO 4.2 09/30/2010     Lab Results   Component Value Date    AST 14 01/31/2017     Lab Results   Component Value Date    ALT 29 01/31/2017     Lab Results   Component Value Date    A1C 7.3 01/31/2017    A1C 7.3 09/28/2016    A1C 8.7 09/30/2010    A1C 7.9 02/18/2010    A1C 7.5 11/24/2009     Potassium   Date Value Ref Range Status   01/31/2017 3.9 3.4 - 5.3 mmol/L Final

## 2017-08-03 NOTE — TELEPHONE ENCOUNTER
Routing refill request to provider for review/approval because:  Shazia given x1 and patient did not follow up, please advise  Patient needs to be seen because:  Per last office visit was to return in 3 months for f/u and labs

## 2017-08-12 DIAGNOSIS — E54 VITAMIN C DEFICIENCY: ICD-10-CM

## 2017-08-12 DIAGNOSIS — E78.5 HYPERLIPIDEMIA LDL GOAL <70: ICD-10-CM

## 2017-08-12 DIAGNOSIS — E11.65 TYPE 2 DIABETES MELLITUS WITH HYPERGLYCEMIA, WITHOUT LONG-TERM CURRENT USE OF INSULIN (H): ICD-10-CM

## 2017-08-12 DIAGNOSIS — D50.9 IRON DEFICIENCY ANEMIA, UNSPECIFIED IRON DEFICIENCY ANEMIA TYPE: ICD-10-CM

## 2017-08-12 DIAGNOSIS — Z98.84 BARIATRIC SURGERY STATUS: ICD-10-CM

## 2017-08-12 DIAGNOSIS — N20.0 RECURRENT KIDNEY STONES: ICD-10-CM

## 2017-08-12 DIAGNOSIS — N20.0 URIC ACID KIDNEY STONE: ICD-10-CM

## 2017-08-12 LAB
ALBUMIN SERPL-MCNC: 3.8 G/DL (ref 3.4–5)
ALP SERPL-CCNC: 84 U/L (ref 40–150)
ALT SERPL W P-5'-P-CCNC: 55 U/L (ref 0–70)
ANION GAP SERPL CALCULATED.3IONS-SCNC: 7 MMOL/L (ref 3–14)
AST SERPL W P-5'-P-CCNC: 33 U/L (ref 0–45)
BILIRUB SERPL-MCNC: 0.6 MG/DL (ref 0.2–1.3)
BUN SERPL-MCNC: 26 MG/DL (ref 7–30)
CALCIUM SERPL-MCNC: 9.2 MG/DL (ref 8.5–10.1)
CHLORIDE SERPL-SCNC: 103 MMOL/L (ref 94–109)
CO2 SERPL-SCNC: 29 MMOL/L (ref 20–32)
CREAT SERPL-MCNC: 1.11 MG/DL (ref 0.66–1.25)
CREAT UR-MCNC: 145 MG/DL
ERYTHROCYTE [DISTWIDTH] IN BLOOD BY AUTOMATED COUNT: 12.8 % (ref 10–15)
FERRITIN SERPL-MCNC: 294 NG/ML (ref 26–388)
GFR SERPL CREATININE-BSD FRML MDRD: 66 ML/MIN/1.7M2
GLUCOSE SERPL-MCNC: 169 MG/DL (ref 70–99)
HBA1C MFR BLD: 6.9 % (ref 4.3–6)
HCT VFR BLD AUTO: 40.5 % (ref 40–53)
HGB BLD-MCNC: 13.6 G/DL (ref 13.3–17.7)
IRON SATN MFR SERPL: 32 % (ref 15–46)
IRON SERPL-MCNC: 96 UG/DL (ref 35–180)
MCH RBC QN AUTO: 32.4 PG (ref 26.5–33)
MCHC RBC AUTO-ENTMCNC: 33.6 G/DL (ref 31.5–36.5)
MCV RBC AUTO: 96 FL (ref 78–100)
MICROALBUMIN UR-MCNC: 34 MG/L
MICROALBUMIN/CREAT UR: 23.17 MG/G CR (ref 0–17)
PLATELET # BLD AUTO: 153 10E9/L (ref 150–450)
POTASSIUM SERPL-SCNC: 4.1 MMOL/L (ref 3.4–5.3)
PROT SERPL-MCNC: 7.7 G/DL (ref 6.8–8.8)
RBC # BLD AUTO: 4.2 10E12/L (ref 4.4–5.9)
SODIUM SERPL-SCNC: 139 MMOL/L (ref 133–144)
TIBC SERPL-MCNC: 297 UG/DL (ref 240–430)
VIT B12 SERPL-MCNC: 4147 PG/ML (ref 193–986)
WBC # BLD AUTO: 5.9 10E9/L (ref 4–11)

## 2017-08-12 PROCEDURE — 82180 ASSAY OF ASCORBIC ACID: CPT | Mod: 90 | Performed by: INTERNAL MEDICINE

## 2017-08-12 PROCEDURE — 84425 ASSAY OF VITAMIN B-1: CPT | Mod: 90 | Performed by: INTERNAL MEDICINE

## 2017-08-12 PROCEDURE — 82607 VITAMIN B-12: CPT | Performed by: INTERNAL MEDICINE

## 2017-08-12 PROCEDURE — 82306 VITAMIN D 25 HYDROXY: CPT | Performed by: INTERNAL MEDICINE

## 2017-08-12 PROCEDURE — 83550 IRON BINDING TEST: CPT | Performed by: INTERNAL MEDICINE

## 2017-08-12 PROCEDURE — 83036 HEMOGLOBIN GLYCOSYLATED A1C: CPT | Performed by: INTERNAL MEDICINE

## 2017-08-12 PROCEDURE — 83540 ASSAY OF IRON: CPT | Performed by: INTERNAL MEDICINE

## 2017-08-12 PROCEDURE — 82043 UR ALBUMIN QUANTITATIVE: CPT | Performed by: INTERNAL MEDICINE

## 2017-08-12 PROCEDURE — 84207 ASSAY OF VITAMIN B-6: CPT | Mod: 90 | Performed by: INTERNAL MEDICINE

## 2017-08-12 PROCEDURE — 99000 SPECIMEN HANDLING OFFICE-LAB: CPT | Performed by: INTERNAL MEDICINE

## 2017-08-12 PROCEDURE — 36415 COLL VENOUS BLD VENIPUNCTURE: CPT | Performed by: INTERNAL MEDICINE

## 2017-08-12 PROCEDURE — 80053 COMPREHEN METABOLIC PANEL: CPT | Performed by: INTERNAL MEDICINE

## 2017-08-12 PROCEDURE — 82728 ASSAY OF FERRITIN: CPT | Performed by: INTERNAL MEDICINE

## 2017-08-12 PROCEDURE — 85027 COMPLETE CBC AUTOMATED: CPT | Performed by: INTERNAL MEDICINE

## 2017-08-14 LAB — DEPRECATED CALCIDIOL+CALCIFEROL SERPL-MC: 60 UG/L (ref 20–75)

## 2017-08-15 LAB
VIT B1 BLD-MCNC: 120 NMOL/L (ref 70–180)
VIT B6 SERPL-MCNC: 216.2 NMOL/L (ref 20–125)

## 2017-08-15 NOTE — TELEPHONE ENCOUNTER
Pt called.  He needs his medications for diabetes. The medications have been denied. He states he gets 2 medications.  Pt has an appt with Dr Irene set up for 8/24.  He had an appt 7/6, but it was cancelled by the clinic.  The pt is out of his medication and has been for days.  Pt is very upset.  Pt uses [facility].

## 2017-08-16 LAB — VIT C SERPL-MCNC: 53 UMOL/L (ref 23–114)

## 2017-08-21 RX ORDER — GLIMEPIRIDE 1 MG/1
1 TABLET ORAL 2 TIMES DAILY
Qty: 60 TABLET | Refills: 0 | Status: SHIPPED | OUTPATIENT
Start: 2017-08-21 | End: 2017-08-24

## 2017-08-21 NOTE — TELEPHONE ENCOUNTER
Patient calling again, has been out of diabetes medication since 8/11. Blood sugar 365 this AM fasting. Has been between 265-365 in the morning. Has checked it a few times in the day, still between that range as well. Patient says he is becoming irritable, urinating every 15 minutes, and is thirsty. Has had these symptoms since off medicine. Urinates even if not drinking fluids (says has avoided fluids at times to decrease urination frequency). Recommended ER due to high blood sugar symptoms but refuses. Just wants refills of medicines.    Has appt. 8/24 with PCP and had labs done 8/12-see results. Please refill. He had appointment 7/6 but clinic cancelled on him.    Guideline used:  Telephone Triage Protocols for Nurses, Fifth Edition, Paty Lamas RN

## 2017-08-24 ENCOUNTER — OFFICE VISIT (OUTPATIENT)
Dept: INTERNAL MEDICINE | Facility: CLINIC | Age: 67
End: 2017-08-24
Payer: COMMERCIAL

## 2017-08-24 VITALS
TEMPERATURE: 98.2 F | OXYGEN SATURATION: 98 % | WEIGHT: 202.7 LBS | HEART RATE: 78 BPM | BODY MASS INDEX: 31.28 KG/M2 | DIASTOLIC BLOOD PRESSURE: 70 MMHG | SYSTOLIC BLOOD PRESSURE: 130 MMHG

## 2017-08-24 DIAGNOSIS — E13.319 RETINOPATHY DUE TO SECONDARY DIABETES MELLITUS (H): ICD-10-CM

## 2017-08-24 DIAGNOSIS — E51.9 THIAMINE DEFICIENCY: ICD-10-CM

## 2017-08-24 DIAGNOSIS — Z71.89 ACP (ADVANCE CARE PLANNING): ICD-10-CM

## 2017-08-24 DIAGNOSIS — D50.9 IRON DEFICIENCY ANEMIA, UNSPECIFIED IRON DEFICIENCY ANEMIA TYPE: ICD-10-CM

## 2017-08-24 DIAGNOSIS — E11.65 TYPE 2 DIABETES MELLITUS WITH HYPERGLYCEMIA, WITHOUT LONG-TERM CURRENT USE OF INSULIN (H): ICD-10-CM

## 2017-08-24 DIAGNOSIS — Z98.84 BARIATRIC SURGERY STATUS: ICD-10-CM

## 2017-08-24 DIAGNOSIS — T45.2X5A VITAMIN B6 INDUCED NEUROPATHY (H): ICD-10-CM

## 2017-08-24 DIAGNOSIS — E55.9 VITAMIN D DEFICIENCY: ICD-10-CM

## 2017-08-24 DIAGNOSIS — E53.8 VITAMIN B12 DEFICIENCY (NON ANEMIC): ICD-10-CM

## 2017-08-24 DIAGNOSIS — G62.0 VITAMIN B6 INDUCED NEUROPATHY (H): ICD-10-CM

## 2017-08-24 DIAGNOSIS — N20.0 RENAL CALCULUS: ICD-10-CM

## 2017-08-24 PROCEDURE — 99214 OFFICE O/P EST MOD 30 MIN: CPT | Performed by: INTERNAL MEDICINE

## 2017-08-24 RX ORDER — LANOLIN ALCOHOL/MO/W.PET/CERES
100 CREAM (GRAM) TOPICAL DAILY
Qty: 90 TABLET | Status: SHIPPED | OUTPATIENT
Start: 2017-08-24 | End: 2018-05-21

## 2017-08-24 RX ORDER — LISINOPRIL 10 MG/1
10 TABLET ORAL DAILY
Qty: 90 TABLET | Refills: 3 | Status: SHIPPED | OUTPATIENT
Start: 2017-08-24 | End: 2018-05-21

## 2017-08-24 RX ORDER — FOLIC ACID 1 MG/1
1 TABLET ORAL DAILY
Qty: 100 TABLET | Refills: 3 | Status: SHIPPED | OUTPATIENT
Start: 2017-08-24 | End: 2018-05-21

## 2017-08-24 RX ORDER — MELATONIN 10 MG
10000 TABLET, SUBLINGUAL SUBLINGUAL DAILY
Qty: 90 TABLET | Refills: 3 | Status: SHIPPED | OUTPATIENT
Start: 2017-08-24 | End: 2018-05-21

## 2017-08-24 RX ORDER — GLIMEPIRIDE 1 MG/1
1 TABLET ORAL 2 TIMES DAILY
Qty: 180 TABLET | Refills: 3 | Status: SHIPPED | OUTPATIENT
Start: 2017-08-24 | End: 2018-05-21

## 2017-08-24 RX ORDER — PYRIDOXINE HCL (VITAMIN B6) 25 MG
25 TABLET ORAL EVERY OTHER DAY
Qty: 45 TABLET | Refills: 2 | Status: SHIPPED | OUTPATIENT
Start: 2017-08-24 | End: 2018-05-21

## 2017-08-24 RX ORDER — TAMSULOSIN HYDROCHLORIDE 0.4 MG/1
0.4 CAPSULE ORAL DAILY
Qty: 90 CAPSULE | Refills: 3 | Status: SHIPPED | OUTPATIENT
Start: 2017-08-24 | End: 2018-05-21

## 2017-08-24 NOTE — MR AVS SNAPSHOT
After Visit Summary   8/24/2017    Los Renee    MRN: [id number]           Patient Information     Date Of Birth          1950        Visit Information        Provider Department      8/24/2017 5:00 PM Karley Irene MD [facility]        Today's Diagnoses     ACP (advance care planning)        Type 2 diabetes mellitus with hyperglycemia, without long-term current use of insulin (H)        Retinopathy due to secondary diabetes mellitus (H)        Bariatric surgery status        Vitamin B12 deficiency (non anemic)        Vitamin B6 induced neuropathy (H)        Vitamin D deficiency        Iron deficiency anemia, unspecified iron deficiency anemia type        Thiamine deficiency        Renal calculus          Care Instructions    ** FOLLOW UP PLAN**:    PLEASE SCHEDULE LABS WITH OFFICE VISIT 6 MONTHS FROM TODAY TO FOLLOW UP ON       BE SURE TO CALL TO SCHEDULE YOUR LAB DRAW APPOINTMENT FOR AT LEAST 5 DAYS BEFORE YOUR NEXT VISIT      YOU MAY CONTACT THE CLINIC IF ANY QUESTIONS OR CONCERNS -[phone] OR VIA View[employer]         Component      Latest Ref Rng & Units 8/12/2017   Sodium      133 - 144 mmol/L 139   Potassium      3.4 - 5.3 mmol/L 4.1   Chloride      94 - 109 mmol/L 103   Carbon Dioxide      20 - 32 mmol/L 29   Anion Gap      3 - 14 mmol/L 7   Glucose      70 - 99 mg/dL 169 (H)   Urea Nitrogen      7 - 30 mg/dL 26   Creatinine      0.66 - 1.25 mg/dL 1.11   GFR Estimate      >60 mL/min/1.7m2 66   GFR Estimate If Black      >60 mL/min/1.7m2 80   Calcium      8.5 - 10.1 mg/dL 9.2   Bilirubin Total      0.2 - 1.3 mg/dL 0.6   Albumin      3.4 - 5.0 g/dL 3.8   Protein Total      6.8 - 8.8 g/dL 7.7   Alkaline Phosphatase      40 - 150 U/L 84   ALT      0 - 70 U/L 55   AST      0 - 45 U/L 33   WBC      4.0 - 11.0 10e9/L 5.9   RBC Count      4.4 - 5.9 10e12/L 4.20 (L)   Hemoglobin      13.3 - 17.7 g/dL 13.6   Hematocrit      40.0 - 53.0 % 40.5   MCV      78 - 100 fl  96   MCH      26.5 - 33.0 pg 32.4   MCHC      31.5 - 36.5 g/dL 33.6   RDW      10.0 - 15.0 % 12.8   Platelet Count      150 - 450 10e9/L 153   Iron      35 - 180 ug/dL 96   Iron Binding Cap      240 - 430 ug/dL 297   Iron Saturation Index      15 - 46 % 32   Creatinine Urine      mg/dL 145   Albumin Urine mg/L      mg/L 34   Albumin Urine mg/g Cr      0 - 17 mg/g Cr 23.17 (H)   Ferritin      60 - 388 ng/mL 294   Vitamin C      23 - 114 umol/L 53   Vitamin B12      600 - 986 pg/mL 4147 (H)   Vitamin D Deficiency screening      60 - 75 ug/L 60   Vitamin B1 Whole Blood Level      70 - 180 nmol/L 120   Vitamin B6      20.0 - 125.0 nmol/L 216.2 (H)   Hemoglobin A1C      4.3 - 6.0 % 6.9 (H)               Follow-ups after your visit        Who to contact     If you have questions or need follow up information about today's clinic visit or your schedule please contact [facility] directly at [phone].  Normal or non-critical lab and imaging results will be communicated to you by [employer], letter or phone within 4 business days after the clinic has received the results. If you do not hear from us within 7 days, please contact the clinic through [employer] or phone. If you have a critical or abnormal lab result, we will notify you by phone as soon as possible.  Submit refill requests through [employer] or call your pharmacy and they will forward the refill request to us. Please allow 3 business days for your refill to be completed.          Additional Information About Your Visit        [employer] Information     [employer] gives you secure access to your electronic health record. If you see a primary care provider, you can also send messages to your care team and make appointments. If you have questions, please call your primary care clinic.  If you do not have a primary care provider, please call [phone] and they will assist you.        Care EveryWhere ID     This is your Care EveryWhere ID. This could  be used by other organizations to access your [place] medical records  [id number]        Your Vitals Were     Pulse Temperature Pulse Oximetry BMI (Body Mass Index)          78 98.2  F (36.8  C) (Oral) 98% 31.28 kg/m2         Blood Pressure from Last 3 Encounters:   08/24/17 130/70   04/29/17 121/61   04/20/17 122/63    Weight from Last 3 Encounters:   08/24/17 202 lb 11.2 oz (91.9 kg)   04/04/17 210 lb (95.3 kg)   01/31/17 203 lb 9.6 oz (92.4 kg)              Today, you had the following     No orders found for display         Today's Medication Changes          These changes are accurate as of: 8/24/17  7:27 PM.  If you have any questions, ask your nurse or doctor.               These medicines have changed or have updated prescriptions.        Dose/Directions    Cyanocobalamin 5000 MCG/ML Liqd   Commonly known as:  B-12 SUPER STRENGTH   This may have changed:  when to take this   Used for:  Bariatric surgery status, Vitamin B12 deficiency (non anemic)   Changed by:  Karley Irene MD        Dose:  0.5 mL   Place 0.5 mLs under the tongue every other day FOR VITAMIN B12 SUPPLEMENTATION, PLEASE PLACE UNDER THE TONGUE   Quantity:  2 Bottle   Refills:  PRN       ferrous fumarate 65 mg ([place]. FE)-Vitamin C 125 mg  MG Tabs tablet   Commonly known as:  VITRON C   This may have changed:  when to take this   Used for:  Bariatric surgery status, Iron deficiency anemia, unspecified iron deficiency anemia type   Changed by:  Karley Irene MD        Dose:  1 tablet   Take 1 tablet by mouth every morning (before breakfast) INDICATION: IRON SUPPLEMENT   Quantity:  90 tablet   Refills:  prn       lisinopril 10 MG tablet   Commonly known as:  PRINIVIL/ZESTRIL   This may have changed:    - medication strength  - how much to take  - when to take this   Used for:  Type 2 diabetes mellitus with hyperglycemia, without long-term current use of insulin (H), Retinopathy due to secondary diabetes mellitus (H)   Changed  by:  Karley Irene MD        Dose:  10 mg   Take 1 tablet (10 mg) by mouth daily INDICATION:TO LOWER BLOOD PRESSURE AND TO PRESERVE KIDNEY FUNCTION   Quantity:  90 tablet   Refills:  3       pyridOXINE 25 MG tablet   Commonly known as:  vitamin B-6   This may have changed:  when to take this   Used for:  Bariatric surgery status, Vitamin B6 induced neuropathy (H)   Changed by:  Karley Irene MD        Dose:  25 mg   Take 1 tablet (25 mg) by mouth every other day INDICATION: VITAMIN B6 DEFICIENCY   Quantity:  45 tablet   Refills:  2         Stop taking these medicines if you haven't already. Please contact your care team if you have questions.     psyllium Packet   Commonly known as:  METAMUCIL/KONSYL   Stopped by:  Karley Irene MD                Where to get your medicines      These medications were sent to [facility] [address]  [address] [place] [address]     Phone:  [phone]     ferrous fumarate 65 mg ([place]. FE)-Vitamin C 125 mg  MG Tabs tablet    folic acid 1 MG tablet    glimepiride 1 MG tablet    linagliptin-metFORMIN ER 5-1000 MG per tablet    lisinopril 10 MG tablet    pyridOXINE 25 MG tablet    tamsulosin 0.4 MG capsule    thiamine 100 MG tablet    Vitamin D3 78757 UNITS Tabs         Some of these will need a paper prescription and others can be bought over the counter.  Ask your nurse if you have questions.     You don't need a prescription for these medications     Cyanocobalamin 5000 MCG/ML Liqd                Primary Care Provider Office Phone # Fax #    Karley Irene [phone]       [address] [place] [id number]        Equal Access to Services     [facility]А [facility] : Haddontrell Pinto, waaxda nerissa, qaybta kaaljosefa santos. So [facility] [phone].    ATENCIÓN: Si habla español, tiene a barr disposición servicios gratuitos de asistencia lingüística.  Ashley romero [phone].    We comply with applicable federal civil rights laws and [place] laws. We do not discriminate on the basis of race, color, national origin, age, disability sex, sexual orientation or gender identity.            Thank you!     Thank you for choosing [facility]  for your care. Our goal is always to provide you with excellent care. Hearing back from our patients is one way we can continue to improve our services. Please take a few minutes to complete the written survey that you may receive in the mail after your visit with us. Thank you!             Your Updated Medication List - Protect others around you: Learn how to safely use, store and throw away your medicines at www.disposemymeds.org.          This list is accurate as of: 8/24/17  7:27 PM.  Always use your most recent med list.                   Brand Name Dispense Instructions for use Diagnosis    ACCU-CHEK UCHE PLUS test strip   Generic drug:  blood glucose monitoring           calcium-vitamin D 600-400 MG-UNIT per tablet    calcium 600 + D    100 tablet    Take 1 tablet by mouth daily with food FOR BONE HEALTH AND FOR VITAMIN D DEFICIENCY (LOW VITAMIN D)    Bariatric surgery status, Vitamin D deficiency, Recurrent kidney stones       Cyanocobalamin 5000 MCG/ML Liqd    B-12 SUPER STRENGTH    2 Bottle    Place 0.5 mLs under the tongue every other day FOR VITAMIN B12 SUPPLEMENTATION, PLEASE PLACE UNDER THE TONGUE    Bariatric surgery status, Vitamin B12 deficiency (non anemic)       ferrous fumarate 65 mg ([place]. FE)-Vitamin C 125 mg  MG Tabs tablet    VITRON C    90 tablet    Take 1 tablet by mouth every morning (before breakfast) INDICATION: IRON SUPPLEMENT    Bariatric surgery status, Iron deficiency anemia, unspecified iron deficiency anemia type       folic acid 1 MG tablet    FOLVITE    100 tablet    Take 1 tablet (1 mg) by mouth daily INDICATION: FOLIC ACID SUPPLEMENT    Bariatric surgery status,  Vitamin B12 deficiency (non anemic)       glimepiride 1 MG tablet    AMARYL    180 tablet    Take 1 tablet (1 mg) by mouth 2 times daily INDICATION: TO TREAT DIABETES    Type 2 diabetes mellitus with hyperglycemia, without long-term current use of insulin (H)       linagliptin-metFORMIN ER 5-1000 MG per tablet    JENTADUETO XR    90 tablet    TAKE ONE TABLET BY MOUTH EVERY DAY WITH FOOD    Type 2 diabetes mellitus with hyperglycemia, without long-term current use of insulin (H)       lisinopril 10 MG tablet    PRINIVIL/ZESTRIL    90 tablet    Take 1 tablet (10 mg) by mouth daily INDICATION:TO LOWER BLOOD PRESSURE AND TO PRESERVE KIDNEY FUNCTION    Type 2 diabetes mellitus with hyperglycemia, without long-term current use of insulin (H), Retinopathy due to secondary diabetes mellitus (H)       potassium bicarb & chloride 25 MEQ Tbef     90 tablet    Take 1 tablet (25 mEq) by mouth daily INDICATION: KIDNEY STONES    Uric acid kidney stone       pyridOXINE 25 MG tablet    vitamin B-6    45 tablet    Take 1 tablet (25 mg) by mouth every other day INDICATION: VITAMIN B6 DEFICIENCY    Bariatric surgery status, Vitamin B6 induced neuropathy (H)       tamsulosin 0.4 MG capsule    FLOMAX    90 capsule    Take 1 capsule (0.4 mg) by mouth daily INDICATION: BPH    Renal calculus       thiamine 100 MG tablet     90 tablet    Take 1 tablet (100 mg) by mouth daily INDICATION: LOW VITAMIN B1    Thiamine deficiency       Vitamin D3 45366 UNITS Tabs     90 tablet    Take 10,000 Units by mouth daily INDICATION:VITAMIN D DEFICIENCY (LOW VITAMIN D)    Bariatric surgery status, Vitamin D deficiency

## 2017-08-24 NOTE — NURSING NOTE
"Chief Complaint   Patient presents with     Diabetes     Hypertension       Initial /82  Pulse 78  Temp 98.2  F (36.8  C) (Oral)  Wt 202 lb 11.2 oz (91.9 kg)  SpO2 98%  BMI 31.28 kg/m2 Estimated body mass index is 31.28 kg/(m^2) as calculated from the following:    Height as of 1/31/17: 5' 7.5\" (1.715 m).    Weight as of this encounter: 202 lb 11.2 oz (91.9 kg).  Medication Reconciliation: complete    "

## 2017-08-24 NOTE — PROGRESS NOTES
SUBJECTIVE:   Los Renee is a 67 year old male who presents to clinic today for the following health issues:      Diabetes Follow-up    Patient is checking blood sugars: twice daily.    Blood sugar testing frequency justification: Has been running higher since he ran out of medications, just restarted yesterday.  Results are as follows:       am - 200's    Diabetic concerns: blood sugar frequently over 200     Symptoms of hypoglycemia (low blood sugar): anger     Paresthesias (numbness or burning in feet) or sores: No   Date of last diabetic eye exam: 10/06/2017     Hypertension Follow-up      Outpatient blood pressures are being checked at home.  Results are in normal range.    Low Salt Diet: low salt    Amount of exercise or physical activity: 6-7 days/week for an average of 45-60 minutes    Problems taking medications regularly: Yes,  Refills denied due to no visit/cancelled appts    Medication side effects: none  Diet: diabetic      He reports that he has felt better with regards to fatigue and muscle aches and pains since his last office visit.    Problem list and histories reviewed & adjusted, as indicated.  Additional history: as documented    Labs reviewed in EPIC    Reviewed and updated as needed this visit by clinical staffTobacco  Allergies       Reviewed and updated as needed this visit by Provider         ROS:  14 point ROS negative except for mild dyspepsia that has resolved somewhat    OBJECTIVE:     /82  Pulse 78  Temp 98.2  F (36.8  C) (Oral)  Wt 202 lb 11.2 oz (91.9 kg)  SpO2 98%  BMI 31.28 kg/m2  Body mass index is 31.28 kg/(m^2).  GENERAL: healthy, alert and no distress  NECK: no adenopathy, no asymmetry, masses, or scars and thyroid normal to palpation  RESP: lungs clear to auscultation - no rales, rhonchi or wheezes  CV: regular rate and rhythm, normal S1 S2, no S3 or S4, no murmur, click or rub, no peripheral edema and peripheral pulses strong  ABDOMEN: soft, nontender, no  hepatosplenomegaly, no masses and bowel sounds normal  MS: no gross musculoskeletal defects noted, no edema  NEURO: Normal strength and tone, mentation intact and speech normal  PSYCH: mentation appears normal, affect normal/bright    Diagnostic Test Results:  Results for orders placed or performed in visit on 08/12/17   Ferritin   Result Value Ref Range    Ferritin 294 26 - 388 ng/mL   CBC with platelets   Result Value Ref Range    WBC 5.9 4.0 - 11.0 10e9/L    RBC Count 4.20 (L) 4.4 - 5.9 10e12/L    Hemoglobin 13.6 13.3 - 17.7 g/dL    Hematocrit 40.5 40.0 - 53.0 %    MCV 96 78 - 100 fl    MCH 32.4 26.5 - 33.0 pg    MCHC 33.6 31.5 - 36.5 g/dL    RDW 12.8 10.0 - 15.0 %    Platelet Count 153 150 - 450 10e9/L   Vitamin C   Result Value Ref Range    Vitamin C 53 23 - 114 umol/L   Comprehensive metabolic panel   Result Value Ref Range    Sodium 139 133 - 144 mmol/L    Potassium 4.1 3.4 - 5.3 mmol/L    Chloride 103 94 - 109 mmol/L    Carbon Dioxide 29 20 - 32 mmol/L    Anion Gap 7 3 - 14 mmol/L    Glucose 169 (H) 70 - 99 mg/dL    Urea Nitrogen 26 7 - 30 mg/dL    Creatinine 1.11 0.66 - 1.25 mg/dL    GFR Estimate 66 >60 mL/min/1.7m2    GFR Estimate If Black 80 >60 mL/min/1.7m2    Calcium 9.2 8.5 - 10.1 mg/dL    Bilirubin Total 0.6 0.2 - 1.3 mg/dL    Albumin 3.8 3.4 - 5.0 g/dL    Protein Total 7.7 6.8 - 8.8 g/dL    Alkaline Phosphatase 84 40 - 150 U/L    ALT 55 0 - 70 U/L    AST 33 0 - 45 U/L   Vitamin B12   Result Value Ref Range    Vitamin B12 4147 (H) 193 - 986 pg/mL   Vitamin D Deficiency   Result Value Ref Range    Vitamin D Deficiency screening 60 20 - 75 ug/L   Vitamin B1 whole blood   Result Value Ref Range    Vitamin B1 Whole Blood Level 120 70 - 180 nmol/L   Vitamin B6   Result Value Ref Range    Vitamin B6 216.2 (H) 20.0 - 125.0 nmol/L   Iron and iron binding capacity   Result Value Ref Range    Iron 96 35 - 180 ug/dL    Iron Binding Cap 297 240 - 430 ug/dL    Iron Saturation Index 32 15 - 46 %   Albumin Random  Urine Quantitative   Result Value Ref Range    Creatinine Urine 145 mg/dL    Albumin Urine mg/L 34 mg/L    Albumin Urine mg/g Cr 23.17 (H) 0 - 17 mg/g Cr   Hemoglobin A1c   Result Value Ref Range    Hemoglobin A1C 6.9 (H) 4.3 - 6.0 %       ASSESSMENT/PLAN:     Diabetes Type II, A1c Controlled, non-insulin dependent   Associated with the following complications    Renal Complications:  CKD    Ophthalmologic Complications: None    Neurologic Complications: None    Peripheral Vascular Complications:  None    Other: None   Plan:  No changes in the patient's current treatment plan    Hyperlipidemia; controlled   Plan:  No changes in the patient's current treatment plan                Patient Instructions     ** FOLLOW UP PLAN**:    PLEASE SCHEDULE LABS WITH OFFICE VISIT 6 MONTHS FROM TODAY TO FOLLOW UP ON       BE SURE TO CALL TO SCHEDULE YOUR LAB DRAW APPOINTMENT FOR AT LEAST 5 DAYS BEFORE YOUR NEXT VISIT      YOU MAY CONTACT THE CLINIC IF ANY QUESTIONS OR CONCERNS -[phone] OR VIA [employer] ([employer])         Component      Latest Ref Rng & Units 8/12/2017   Sodium      133 - 144 mmol/L 139   Potassium      3.4 - 5.3 mmol/L 4.1   Chloride      94 - 109 mmol/L 103   Carbon Dioxide      20 - 32 mmol/L 29   Anion Gap      3 - 14 mmol/L 7   Glucose      70 - 99 mg/dL 169 (H)   Urea Nitrogen      7 - 30 mg/dL 26   Creatinine      0.66 - 1.25 mg/dL 1.11   GFR Estimate      >60 mL/min/1.7m2 66   GFR Estimate If Black      >60 mL/min/1.7m2 80   Calcium      8.5 - 10.1 mg/dL 9.2   Bilirubin Total      0.2 - 1.3 mg/dL 0.6   Albumin      3.4 - 5.0 g/dL 3.8   Protein Total      6.8 - 8.8 g/dL 7.7   Alkaline Phosphatase      40 - 150 U/L 84   ALT      0 - 70 U/L 55   AST      0 - 45 U/L 33   WBC      4.0 - 11.0 10e9/L 5.9   RBC Count      4.4 - 5.9 10e12/L 4.20 (L)   Hemoglobin      13.3 - 17.7 g/dL 13.6   Hematocrit      40.0 - 53.0 % 40.5   MCV      78 - 100 fl 96   MCH      26.5 - 33.0 pg 32.4   MCHC      31.5 - 36.5 g/dL 33.6   RDW       10.0 - 15.0 % 12.8   Platelet Count      150 - 450 10e9/L 153   Iron      35 - 180 ug/dL 96   Iron Binding Cap      240 - 430 ug/dL 297   Iron Saturation Index      15 - 46 % 32   Creatinine Urine      mg/dL 145   Albumin Urine mg/L      mg/L 34   Albumin Urine mg/g Cr      0 - 17 mg/g Cr 23.17 (H)   Ferritin      60 - 388 ng/mL 294   Vitamin C      23 - 114 umol/L 53   Vitamin B12      600 - 986 pg/mL 4147 (H)   Vitamin D Deficiency screening      60 - 75 ug/L 60   Vitamin B1 Whole Blood Level      70 - 180 nmol/L 120   Vitamin B6      20.0 - 125.0 nmol/L 216.2 (H)   Hemoglobin A1C      4.3 - 6.0 % 6.9 (H)           Karley Irene MD  [facility]

## 2017-08-25 NOTE — PATIENT INSTRUCTIONS
** FOLLOW UP PLAN**:    PLEASE SCHEDULE LABS WITH OFFICE VISIT 6 MONTHS FROM TODAY TO FOLLOW UP ON       BE SURE TO CALL TO SCHEDULE YOUR LAB DRAW APPOINTMENT FOR AT LEAST 5 DAYS BEFORE YOUR NEXT VISIT      YOU MAY CONTACT THE CLINIC IF ANY QUESTIONS OR CONCERNS -[phone] OR VIA MST         Component      Latest Ref Rng & Units 8/12/2017   Sodium      133 - 144 mmol/L 139   Potassium      3.4 - 5.3 mmol/L 4.1   Chloride      94 - 109 mmol/L 103   Carbon Dioxide      20 - 32 mmol/L 29   Anion Gap      3 - 14 mmol/L 7   Glucose      70 - 99 mg/dL 169 (H)   Urea Nitrogen      7 - 30 mg/dL 26   Creatinine      0.66 - 1.25 mg/dL 1.11   GFR Estimate      >60 mL/min/1.7m2 66   GFR Estimate If Black      >60 mL/min/1.7m2 80   Calcium      8.5 - 10.1 mg/dL 9.2   Bilirubin Total      0.2 - 1.3 mg/dL 0.6   Albumin      3.4 - 5.0 g/dL 3.8   Protein Total      6.8 - 8.8 g/dL 7.7   Alkaline Phosphatase      40 - 150 U/L 84   ALT      0 - 70 U/L 55   AST      0 - 45 U/L 33   WBC      4.0 - 11.0 10e9/L 5.9   RBC Count      4.4 - 5.9 10e12/L 4.20 (L)   Hemoglobin      13.3 - 17.7 g/dL 13.6   Hematocrit      40.0 - 53.0 % 40.5   MCV      78 - 100 fl 96   MCH      26.5 - 33.0 pg 32.4   MCHC      31.5 - 36.5 g/dL 33.6   RDW      10.0 - 15.0 % 12.8   Platelet Count      150 - 450 10e9/L 153   Iron      35 - 180 ug/dL 96   Iron Binding Cap      240 - 430 ug/dL 297   Iron Saturation Index      15 - 46 % 32   Creatinine Urine      mg/dL 145   Albumin Urine mg/L      mg/L 34   Albumin Urine mg/g Cr      0 - 17 mg/g Cr 23.17 (H)   Ferritin      60 - 388 ng/mL 294   Vitamin C      23 - 114 umol/L 53   Vitamin B12      600 - 986 pg/mL 4147 (H)   Vitamin D Deficiency screening      60 - 75 ug/L 60   Vitamin B1 Whole Blood Level      70 - 180 nmol/L 120   Vitamin B6      20.0 - 125.0 nmol/L 216.2 (H)   Hemoglobin A1C      4.3 - 6.0 % 6.9 (H)

## 2018-01-10 ENCOUNTER — DOCUMENTATION ONLY (OUTPATIENT)
Dept: SURGERY | Facility: CLINIC | Age: 68
End: 2018-01-10

## 2018-02-26 ENCOUNTER — OFFICE VISIT (OUTPATIENT)
Dept: INTERNAL MEDICINE | Facility: CLINIC | Age: 68
End: 2018-02-26
Payer: COMMERCIAL

## 2018-02-26 ENCOUNTER — RADIANT APPOINTMENT (OUTPATIENT)
Dept: GENERAL RADIOLOGY | Facility: CLINIC | Age: 68
End: 2018-02-26
Attending: PHYSICIAN ASSISTANT
Payer: COMMERCIAL

## 2018-02-26 VITALS
BODY MASS INDEX: 29.52 KG/M2 | HEIGHT: 68 IN | HEART RATE: 98 BPM | TEMPERATURE: 97.8 F | WEIGHT: 194.8 LBS | DIASTOLIC BLOOD PRESSURE: 66 MMHG | OXYGEN SATURATION: 98 % | SYSTOLIC BLOOD PRESSURE: 120 MMHG

## 2018-02-26 DIAGNOSIS — R05.9 COUGH: ICD-10-CM

## 2018-02-26 DIAGNOSIS — J20.9 ACUTE BRONCHITIS WITH SYMPTOMS > 10 DAYS: ICD-10-CM

## 2018-02-26 DIAGNOSIS — E11.65 TYPE 2 DIABETES MELLITUS WITH HYPERGLYCEMIA, WITHOUT LONG-TERM CURRENT USE OF INSULIN (H): ICD-10-CM

## 2018-02-26 DIAGNOSIS — H65.02 ACUTE SEROUS OTITIS MEDIA OF LEFT EAR, RECURRENCE NOT SPECIFIED: Primary | ICD-10-CM

## 2018-02-26 PROCEDURE — 71046 X-RAY EXAM CHEST 2 VIEWS: CPT | Mod: FY

## 2018-02-26 PROCEDURE — 99214 OFFICE O/P EST MOD 30 MIN: CPT | Performed by: PHYSICIAN ASSISTANT

## 2018-02-26 RX ORDER — ALBUTEROL SULFATE 90 UG/1
2 AEROSOL, METERED RESPIRATORY (INHALATION) EVERY 6 HOURS PRN
Qty: 1 INHALER | Refills: 0 | Status: SHIPPED | OUTPATIENT
Start: 2018-02-26 | End: 2018-05-21

## 2018-02-26 NOTE — MR AVS SNAPSHOT
After Visit Summary   2/26/2018    Los Renee    MRN: [id number]           Patient Information     Date Of Birth          1950        Visit Information        Provider Department      2/26/2018 7:40 AM Lori Peace PA-C [facility]        Today's Diagnoses     Acute serous otitis media of left ear, recurrence not specified    -  1    Acute bronchitis with symptoms > 10 days        Cough        Type 2 diabetes mellitus with hyperglycemia, without long-term current use of insulin (H)          Care Instructions    Fluids rest  Over the counter Mucinex to thin mucus..              Follow-ups after your visit        Who to contact     If you have questions or need follow up information about today's clinic visit or your schedule please contact [facility] directly at [phone].  Normal or non-critical lab and imaging results will be communicated to you by MyChart, letter or phone within 4 business days after the clinic has received the results. If you do not hear from us within 7 days, please contact the clinic through MyChart or phone. If you have a critical or abnormal lab result, we will notify you by phone as soon as possible.  Submit refill requests through [employer] or call your pharmacy and they will forward the refill request to us. Please allow 3 business days for your refill to be completed.          Additional Information About Your Visit        MyChart Information     [employer] gives you secure access to your electronic health record. If you see a primary care provider, you can also send messages to your care team and make appointments. If you have questions, please call your primary care clinic.  If you do not have a primary care provider, please call [phone] and they will assist you.        Care EveryWhere ID     This is your Care EveryWhere ID. This could be used by other organizations to access your [facility]  "records  [id number]        Your Vitals Were     Pulse Temperature Height Pulse Oximetry BMI (Body Mass Index)       98 97.8  F (36.6  C) (Oral) 5' 7.5\" (1.715 m) 98% 30.06 kg/m2        Blood Pressure from Last 3 Encounters:   02/26/18 120/66   08/24/17 130/70   04/29/17 121/61    Weight from Last 3 Encounters:   02/26/18 194 lb 12.8 oz (88.4 kg)   08/24/17 202 lb 11.2 oz (91.9 kg)   04/04/17 210 lb (95.3 kg)              We Performed the Following     XR Chest 2 Views          Today's Medication Changes          These changes are accurate as of 2/26/18  8:33 AM.  If you have any questions, ask your nurse or doctor.               Start taking these medicines.        Dose/Directions    albuterol 108 (90 BASE) MCG/ACT Inhaler   Commonly known as:  PROAIR HFA/PROVENTIL HFA/VENTOLIN HFA   Used for:  Acute bronchitis with symptoms > 10 days, Cough   Started by:  Lori Peace PA-C        Dose:  2 puff   Inhale 2 puffs into the lungs every 6 hours as needed for shortness of breath / dyspnea or wheezing   Quantity:  1 Inhaler   Refills:  0       amoxicillin-clavulanate 875-125 MG per tablet   Commonly known as:  AUGMENTIN   Used for:  Acute serous otitis media of left ear, recurrence not specified, Acute bronchitis with symptoms > 10 days   Started by:  Lori Peace PA-C        Dose:  1 tablet   Take 1 tablet by mouth 2 times daily for 10 days   Quantity:  20 tablet   Refills:  0            Where to get your medicines      These medications were sent to [address]  [address] [address]     Phone:  [phone]     albuterol 108 (90 BASE) MCG/ACT Inhaler    amoxicillin-clavulanate 875-125 MG per tablet                Primary Care Provider Office Phone # Fax #    Karley Irene [phone]       XXX RESIGNED XXX  [facility] [address]        Equal Access to Services     ROBERTO MORENO AH: katiana Roland " sarytaylor latoya wardjosefa newmna. So [facility] [phone].    ATENCIÓN: Si sergo orr, tiene a barr disposición servicios gratuitos de asistencia lingüística. Ashley al [phone].    We comply with applicable federal civil rights laws and [place] laws. We do not discriminate on the basis of race, color, national origin, age, disability, sex, sexual orientation, or gender identity.            Thank you!     Thank you for choosing [facility][1]  for your care. Our goal is always to provide you with excellent care. Hearing back from our patients is one way we can continue to improve our services. Please take a few minutes to complete the written survey that you may receive in the mail after your visit with us. Thank you!             Your Updated Medication List - Protect others around you: Learn how to safely use, store and throw away your medicines at www.disposemymeds.org.          This list is accurate as of 2/26/18  8:33 AM.  Always use your most recent med list.                   Brand Name Dispense Instructions for use Diagnosis    ACCU-CHEK UCHE PLUS test strip   Generic drug:  blood glucose monitoring           albuterol 108 (90 BASE) MCG/ACT Inhaler    PROAIR HFA/PROVENTIL HFA/VENTOLIN HFA    1 Inhaler    Inhale 2 puffs into the lungs every 6 hours as needed for shortness of breath / dyspnea or wheezing    Acute bronchitis with symptoms > 10 days, Cough       amoxicillin-clavulanate 875-125 MG per tablet    AUGMENTIN    20 tablet    Take 1 tablet by mouth 2 times daily for 10 days    Acute serous otitis media of left ear, recurrence not specified, Acute bronchitis with symptoms > 10 days       calcium-vitamin D 600-400 MG-UNIT per tablet    calcium 600 + D    100 tablet    Take 1 tablet by mouth daily with food FOR BONE HEALTH AND FOR VITAMIN D DEFICIENCY (LOW VITAMIN D)    Bariatric surgery status, Vitamin D deficiency, Recurrent kidney  stones       Cyanocobalamin 5000 MCG/ML Liqd    B-12 SUPER STRENGTH    2 Bottle    Place 0.5 mLs under the tongue every other day FOR VITAMIN B12 SUPPLEMENTATION, PLEASE PLACE UNDER THE TONGUE    Bariatric surgery status, Vitamin B12 deficiency (non anemic)       ferrous fumarate 65 mg ([place]. FE)-Vitamin C 125 mg  MG Tabs tablet    VITRON C    90 tablet    Take 1 tablet by mouth every morning (before breakfast) INDICATION: IRON SUPPLEMENT    Bariatric surgery status, Iron deficiency anemia, unspecified iron deficiency anemia type       folic acid 1 MG tablet    FOLVITE    100 tablet    Take 1 tablet (1 mg) by mouth daily INDICATION: FOLIC ACID SUPPLEMENT    Bariatric surgery status, Vitamin B12 deficiency (non anemic)       glimepiride 1 MG tablet    AMARYL    180 tablet    Take 1 tablet (1 mg) by mouth 2 times daily INDICATION: TO TREAT DIABETES    Type 2 diabetes mellitus with hyperglycemia, without long-term current use of insulin (H)       linagliptin-metFORMIN ER 5-1000 MG per tablet    JENTADUETO XR    90 tablet    TAKE ONE TABLET BY MOUTH EVERY DAY WITH FOOD    Type 2 diabetes mellitus with hyperglycemia, without long-term current use of insulin (H)       lisinopril 10 MG tablet    PRINIVIL/ZESTRIL    90 tablet    Take 1 tablet (10 mg) by mouth daily INDICATION:TO LOWER BLOOD PRESSURE AND TO PRESERVE KIDNEY FUNCTION    Type 2 diabetes mellitus with hyperglycemia, without long-term current use of insulin (H), Retinopathy due to secondary diabetes mellitus (H)       potassium bicarb & chloride 25 MEQ Tbef     90 tablet    Take 1 tablet (25 mEq) by mouth daily INDICATION: KIDNEY STONES    Uric acid kidney stone       pyridOXINE 25 MG tablet    vitamin B-6    45 tablet    Take 1 tablet (25 mg) by mouth every other day INDICATION: VITAMIN B6 DEFICIENCY    Bariatric surgery status, Vitamin B6 induced neuropathy (H)       tamsulosin 0.4 MG capsule    FLOMAX    90 capsule    Take 1 capsule (0.4 mg) by mouth  daily INDICATION: BPH    Renal calculus       thiamine 100 MG tablet     90 tablet    Take 1 tablet (100 mg) by mouth daily INDICATION: LOW VITAMIN B1    Thiamine deficiency       Vitamin D3 02738 UNITS Tabs     90 tablet    Take 10,000 Units by mouth daily INDICATION:VITAMIN D DEFICIENCY (LOW VITAMIN D)    Bariatric surgery status, Vitamin D deficiency

## 2018-02-26 NOTE — PROGRESS NOTES
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      Therapies Tried and outcome: tylenol for aches       -------------------------------------    Problem list and histories reviewed & adjusted, as indicated.  Additional history: as documented    Labs reviewed in EPIC    Reviewed and updated as needed this visit by clinical staff  Tobacco  Allergies  Meds       Reviewed and updated as needed this visit by Provider  Allergies  Meds         ROS:  Constitutional, HEENT, cardiovascular, pulmonary, gi and gu systems are negative, except as otherwise noted.    OBJECTIVE:     /66 (BP Location: Left arm, Patient Position: Chair, Cuff Size: Adult Regular)  Pulse 98  Temp 97.8  F (36.6  C) (Oral)  Ht 5' 7.5\" (1.715 m)  Wt 194 lb 12.8 oz (88.4 kg)  SpO2 98%  BMI 30.06 kg/m2  Body mass index is 30.06 kg/(m^2).  GENERAL: healthy, alert and no distress  HENT: normal cephalic/atraumatic, right ear: clear effusion, left ear: erythematous, bulging membrane and mucopurulent effusion, nose and mouth without ulcers or lesions, oropharynx clear and oral mucous membranes moist  NECK: no adenopathy, no asymmetry, masses, or scars and thyroid normal to palpation  RESP: lungs clear to auscultation - no rales, rhonchi or wheezes  CV: regular rate and rhythm, normal S1 S2, no S3 or S4, no murmur, click or rub, no peripheral edema and peripheral pulses strong  SKIN: no " suspicious lesions or rashes    Diagnostic Test Results:  /   Results for orders placed or performed in visit on 02/26/18   XR Chest 2 Views    Narrative    XR CHEST 2 VW 2/26/2018 8:25 AM    COMPARISON: 11/30/2010    HISTORY: Cough.      Impression    IMPRESSION: Calcified left hilar lymph nodes again seen. No focal  airspace disease. No pleural effusion or pneumothorax. Cardiac  silhouette within normal limits.    YADY PERKINS MD         ASSESSMENT/PLAN:             1. Acute serous otitis media of left ear, recurrence not specified    - amoxicillin-clavulanate (AUGMENTIN) 875-125 MG per tablet; Take 1 tablet by mouth 2 times daily for 10 days  Dispense: 20 tablet; Refill: 0    2. Acute bronchitis with symptoms > 10 days    - amoxicillin-clavulanate (AUGMENTIN) 875-125 MG per tablet; Take 1 tablet by mouth 2 times daily for 10 days  Dispense: 20 tablet; Refill: 0  - albuterol (PROAIR HFA/PROVENTIL HFA/VENTOLIN HFA) 108 (90 BASE) MCG/ACT Inhaler; Inhale 2 puffs into the lungs every 6 hours as needed for shortness of breath / dyspnea or wheezing  Dispense: 1 Inhaler; Refill: 0    3. Cough    - XR Chest 2 Views  - albuterol (PROAIR HFA/PROVENTIL HFA/VENTOLIN HFA) 108 (90 BASE) MCG/ACT Inhaler; Inhale 2 puffs into the lungs every 6 hours as needed for shortness of breath / dyspnea or wheezing  Dispense: 1 Inhaler; Refill: 0    4. Type 2 diabetes mellitus with hyperglycemia, without long-term current use of insulin (H)    Reviewed to get in for visit with a New PCP - info sheet given.          Patient Instructions   Fluids rest  Over the counter Mucinex to thin mucus..          Lori Peace PA-C  [facility]

## 2018-03-03 ENCOUNTER — OFFICE VISIT (OUTPATIENT)
Dept: URGENT CARE | Facility: URGENT CARE | Age: 68
End: 2018-03-03
Payer: COMMERCIAL

## 2018-03-03 VITALS
TEMPERATURE: 97.6 F | DIASTOLIC BLOOD PRESSURE: 50 MMHG | OXYGEN SATURATION: 97 % | RESPIRATION RATE: 22 BRPM | HEART RATE: 94 BPM | SYSTOLIC BLOOD PRESSURE: 110 MMHG | BODY MASS INDEX: 30.24 KG/M2 | WEIGHT: 196 LBS

## 2018-03-03 DIAGNOSIS — R09.81 NASAL CONGESTION: Primary | ICD-10-CM

## 2018-03-03 PROCEDURE — 99213 OFFICE O/P EST LOW 20 MIN: CPT | Performed by: NURSE PRACTITIONER

## 2018-03-03 RX ORDER — FLUTICASONE PROPIONATE 50 MCG
1-2 SPRAY, SUSPENSION (ML) NASAL DAILY
Qty: 1 BOTTLE | Refills: 0 | Status: SHIPPED | OUTPATIENT
Start: 2018-03-03 | End: 2018-05-21

## 2018-03-03 NOTE — PROGRESS NOTES
SUBJECTIVE:   Los Renee is a 67 year old male presenting with a chief complaint of   Chief Complaint   Patient presents with     URI     Pt reports that he saw CRYSTAL Kilgore on 2/26/18 and treated for a left ear infection. Pt reports that the ear and the cough are not better. Pt feeling short of breath and fatigued.     Urgent Care   .    Onset of symptoms was 2 week(s) ago.  Course of illness is worsening.    Severity moderate  Current and Associated symptoms: chills, runny nose, stuffy nose and ear pain bilateral  Treatment measures tried include Tylenol/Ibuprofen and abx.  Predisposing factors include None.        Review of Systems   All other systems reviewed and are negative.        Past Medical History:   Diagnosis Date     Arthritis      Gastric bypass status for obesity      Gastro-oesophageal reflux disease      GI bleed 11/1/2012     Mixed hyperlipidemia 1/7/2008     Morbid obesity (H) 1/24/2008     SOLITARIO (obstructive sleep apnea) 2010    uses CPAP     Other chronic pain     chronic left knee pain     Renal disease      Type II or unspecified type diabetes mellitus without mention of complication, not stated as uncontrolled 1/7/2008     Uncomplicated asthma     related to allergies     Unspecified essential hypertension 1/7/2008    resolved since gastric surgery     Current Outpatient Prescriptions   Medication Sig Dispense Refill     amoxicillin-clavulanate (AUGMENTIN) 875-125 MG per tablet Take 1 tablet by mouth 2 times daily for 10 days 20 tablet 0     albuterol (PROAIR HFA/PROVENTIL HFA/VENTOLIN HFA) 108 (90 BASE) MCG/ACT Inhaler Inhale 2 puffs into the lungs every 6 hours as needed for shortness of breath / dyspnea or wheezing 1 Inhaler 0     lisinopril (PRINIVIL/ZESTRIL) 10 MG tablet Take 1 tablet (10 mg) by mouth daily INDICATION:TO LOWER BLOOD PRESSURE AND TO PRESERVE KIDNEY FUNCTION 90 tablet 3     Cyanocobalamin (B-12 SUPER STRENGTH) 5000 MCG/ML LIQD Place 0.5 mLs under the tongue every  other day FOR VITAMIN B12 SUPPLEMENTATION, PLEASE PLACE UNDER THE TONGUE 2 Bottle PRN     pyridOXINE (VITAMIN  B-6) 25 MG tablet Take 1 tablet (25 mg) by mouth every other day INDICATION: VITAMIN B6 DEFICIENCY 45 tablet 2     Cholecalciferol (VITAMIN D3) 20957 UNITS TABS Take 10,000 Units by mouth daily INDICATION:VITAMIN D DEFICIENCY (LOW VITAMIN D) 90 tablet 3     ferrous fumarate 65 mg, [place]. FE,-Vitamin C 125 mg (VITRON C)  MG TABS tablet Take 1 tablet by mouth every morning (before breakfast) INDICATION: IRON SUPPLEMENT 90 tablet prn     glimepiride (AMARYL) 1 MG tablet Take 1 tablet (1 mg) by mouth 2 times daily INDICATION: TO TREAT DIABETES 180 tablet 3     linagliptin-metFORMIN ER (JENTADUETO XR) 5-1000 MG per tablet TAKE ONE TABLET BY MOUTH EVERY DAY WITH FOOD 90 tablet 3     tamsulosin (FLOMAX) 0.4 MG capsule Take 1 capsule (0.4 mg) by mouth daily INDICATION: BPH 90 capsule 3     folic acid (FOLVITE) 1 MG tablet Take 1 tablet (1 mg) by mouth daily INDICATION: FOLIC ACID SUPPLEMENT 100 tablet 3     calcium-vitamin D (CALCIUM 600 + D) 600-400 MG-UNIT per tablet Take 1 tablet by mouth daily with food FOR BONE HEALTH AND FOR VITAMIN D DEFICIENCY (LOW VITAMIN D) 100 tablet PRN     thiamine 100 MG tablet Take 1 tablet (100 mg) by mouth daily INDICATION: LOW VITAMIN B1 (Patient not taking: Reported on 3/3/2018) 90 tablet prn     potassium bicarb & chloride 25 MEQ TBEF Take 1 tablet (25 mEq) by mouth daily INDICATION: KIDNEY STONES (Patient not taking: Reported on 3/3/2018) 90 tablet 3     ACCU-CHEK UCHE PLUS test strip        Social History   Substance Use Topics     Smoking status: Never Smoker     Smokeless tobacco: Never Used     Alcohol use No       OBJECTIVE  /50  Pulse 94  Temp 97.6  F (36.4  C)  Resp 22  Wt 196 lb (88.9 kg)  SpO2 97%  BMI 30.24 kg/m2    Physical Exam   Constitutional: He is oriented to person, place, and time and well-developed, well-nourished, and in no distress. No  distress.   HENT:   Head: Normocephalic and atraumatic.   Right Ear: External ear normal.   Left Ear: External ear normal.   Nose: Mucosal edema and rhinorrhea present.   Mouth/Throat: Oropharynx is clear and moist.   Eyes: Conjunctivae are normal. Pupils are equal, round, and reactive to light.   Neck: Normal range of motion.   Cardiovascular: Normal rate and regular rhythm.    Pulmonary/Chest: Effort normal and breath sounds normal. No respiratory distress. He has no wheezes.   Lymphadenopathy:     He has cervical adenopathy.   Neurological: He is alert and oriented to person, place, and time.   Skin: Skin is warm and dry. He is not diaphoretic.   Psychiatric: Affect normal.       Labs:  No results found for this or any previous visit (from the past 24 hour(s)).    X-Ray was not done.    ASSESSMENT:      ICD-10-CM    1. Nasal congestion R09.81 fluticasone (FLONASE) 50 MCG/ACT spray        PLAN:    URI Adult:  Tylenol, Ibuprofen, Fluids, Rest and flonase    Followup:    If not improving or if condition worsens, follow up with your Primary Care Provider    Patient Instructions     Sinusitis (No Antibiotics)    The sinuses are air-filled spaces within the bones of the face. They connect to the inside of the nose. Sinusitis is an inflammation of the tissue lining the sinus cavity. Sinus inflammation can occur during a cold. It can also be due to allergies to pollens and other particles in the air. It can cause symptoms such as sinus congestion, headache, sore throat, facial swelling and fullness. It may also cause a low-grade fever. No infection is present, and no antibiotic treatment is needed.  Home care    Drink plenty of water, hot tea, and other liquids. This may help thin mucus. It also may promote sinus drainage.    Heat may help soothe painful areas of the face. Use a towel soaked in hot water. Or,  the shower and direct the hot spray onto your face. Using a vaporizer along with a menthol rub at night  may also help.     An expectorant containing guaifenesin may help thin the mucus and promote drainage from the sinuses.    Over-the-counter decongestants may be used unless a similar medicine was prescribed. Nasal sprays work the fastest. Use one that contains phenylephrine or oxymetazoline. First blow the nose gently. Then use the spray. Do not use these medicines more often than directed on the label or symptoms may get worse. You may also use tablets containing pseudoephedrine. Avoid products that combine ingredients, because side effects may be increased. Read labels. You can also ask the pharmacist for help. (NOTE: Persons with high blood pressure should not use decongestants. They can raise blood pressure.)    Over-the-counter antihistamines may help if allergies contributed to your sinusitis.      Use acetaminophen or ibuprofen to control pain, unless another pain medicine was prescribed. (If you have chronic liver or kidney disease or ever had a stomach ulcer, talk with your doctor before using these medicines. Aspirin should never be used in anyone under 18 years of age who is ill with a fever. It may cause severe liver damage.)    Use nasal rinses or irrigation as instructed by your health care provider.    Don't smoke. This can worsen symptoms.  Follow-up care  Follow up with your healthcare provider or our staff if you are not improving within the next week.  When to seek medical advice  Call your healthcare provider if any of these occur:    Green or yellow discharge from the nose or into the throat    Facial pain or headache becoming more severe    Stiff neck    Unusual drowsiness or confusion    Swelling of the forehead or eyelids    Vision problems, including blurred or double vision    Fever of 100.4 F (38 C) or higher, or as directed by your healthcare provider    Seizure    Breathing problems    Symptoms not resolving within 10 days  Date Last Reviewed: 4/13/2015 2000-2017 [employer],  [facility]. [address] [place] [address]. All rights reserved. This information is not intended as a substitute for professional medical care. Always follow your healthcare professional's instructions.

## 2018-03-03 NOTE — PATIENT INSTRUCTIONS
Sinusitis (No Antibiotics)    The sinuses are air-filled spaces within the bones of the face. They connect to the inside of the nose. Sinusitis is an inflammation of the tissue lining the sinus cavity. Sinus inflammation can occur during a cold. It can also be due to allergies to pollens and other particles in the air. It can cause symptoms such as sinus congestion, headache, sore throat, facial swelling and fullness. It may also cause a low-grade fever. No infection is present, and no antibiotic treatment is needed.  Home care    Drink plenty of water, hot tea, and other liquids. This may help thin mucus. It also may promote sinus drainage.    Heat may help soothe painful areas of the face. Use a towel soaked in hot water. Or,  the shower and direct the hot spray onto your face. Using a vaporizer along with a menthol rub at night may also help.     An expectorant containing guaifenesin may help thin the mucus and promote drainage from the sinuses.    Over-the-counter decongestants may be used unless a similar medicine was prescribed. Nasal sprays work the fastest. Use one that contains phenylephrine or oxymetazoline. First blow the nose gently. Then use the spray. Do not use these medicines more often than directed on the label or symptoms may get worse. You may also use tablets containing pseudoephedrine. Avoid products that combine ingredients, because side effects may be increased. Read labels. You can also ask the pharmacist for help. (NOTE: Persons with high blood pressure should not use decongestants. They can raise blood pressure.)    Over-the-counter antihistamines may help if allergies contributed to your sinusitis.      Use acetaminophen or ibuprofen to control pain, unless another pain medicine was prescribed. (If you have chronic liver or kidney disease or ever had a stomach ulcer, talk with your doctor before using these medicines. Aspirin should never be used in anyone under 18 years of age  who is ill with a fever. It may cause severe liver damage.)    Use nasal rinses or irrigation as instructed by your health care provider.    Don't smoke. This can worsen symptoms.  Follow-up care  Follow up with your healthcare provider or our staff if you are not improving within the next week.  When to seek medical advice  Call your healthcare provider if any of these occur:    Green or yellow discharge from the nose or into the throat    Facial pain or headache becoming more severe    Stiff neck    Unusual drowsiness or confusion    Swelling of the forehead or eyelids    Vision problems, including blurred or double vision    Fever of 100.4 F (38 C) or higher, or as directed by your healthcare provider    Seizure    Breathing problems    Symptoms not resolving within 10 days  Date Last Reviewed: 4/13/2015 2000-2017 [employer]. [address] [place] [address], [place] [address]. All rights reserved. This information is not intended as a substitute for professional medical care. Always follow your healthcare professional's instructions.

## 2018-03-03 NOTE — MR AVS SNAPSHOT
After Visit Summary   3/3/2018    Los Renee    MRN: [id number]           Patient Information     Date Of Birth          1950        Visit Information        Provider Department      3/3/2018 11:05 AM Kristy Saucedo APRN CNP [facility]        Today's Diagnoses     Nasal congestion    -  1      Care Instructions      Sinusitis (No Antibiotics)    The sinuses are air-filled spaces within the bones of the face. They connect to the inside of the nose. Sinusitis is an inflammation of the tissue lining the sinus cavity. Sinus inflammation can occur during a cold. It can also be due to allergies to pollens and other particles in the air. It can cause symptoms such as sinus congestion, headache, sore throat, facial swelling and fullness. It may also cause a low-grade fever. No infection is present, and no antibiotic treatment is needed.  Home care    Drink plenty of water, hot tea, and other liquids. This may help thin mucus. It also may promote sinus drainage.    Heat may help soothe painful areas of the face. Use a towel soaked in hot water. Or,  the shower and direct the hot spray onto your face. Using a vaporizer along with a menthol rub at night may also help.     An expectorant containing guaifenesin may help thin the mucus and promote drainage from the sinuses.    Over-the-counter decongestants may be used unless a similar medicine was prescribed. Nasal sprays work the fastest. Use one that contains phenylephrine or oxymetazoline. First blow the nose gently. Then use the spray. Do not use these medicines more often than directed on the label or symptoms may get worse. You may also use tablets containing pseudoephedrine. Avoid products that combine ingredients, because side effects may be increased. Read labels. You can also ask the pharmacist for help. (NOTE: Persons with high blood pressure should not use decongestants. They can raise blood  pressure.)    Over-the-counter antihistamines may help if allergies contributed to your sinusitis.      Use acetaminophen or ibuprofen to control pain, unless another pain medicine was prescribed. (If you have chronic liver or kidney disease or ever had a stomach ulcer, talk with your doctor before using these medicines. Aspirin should never be used in anyone under 18 years of age who is ill with a fever. It may cause severe liver damage.)    Use nasal rinses or irrigation as instructed by your health care provider.    Don't smoke. This can worsen symptoms.  Follow-up care  Follow up with your healthcare provider or our staff if you are not improving within the next week.  When to seek medical advice  Call your healthcare provider if any of these occur:    Green or yellow discharge from the nose or into the throat    Facial pain or headache becoming more severe    Stiff neck    Unusual drowsiness or confusion    Swelling of the forehead or eyelids    Vision problems, including blurred or double vision    Fever of 100.4 F (38 C) or higher, or as directed by your healthcare provider    Seizure    Breathing problems    Symptoms not resolving within 10 days  Date Last Reviewed: 4/13/2015 2000-2017 [employer]. [address] [place] [address]. All rights reserved. This information is not intended as a substitute for professional medical care. Always follow your healthcare professional's instructions.                Follow-ups after your visit        Who to contact     If you have questions or need follow up information about today's clinic visit or your schedule please contact [facility] directly at [phone].  Normal or non-critical lab and imaging results will be communicated to you by MyChart, letter or phone within 4 business days after the clinic has received the results. If you do not hear from us within 7 days, please contact the clinic through [employer] or  phone. If you have a critical or abnormal lab result, we will notify you by phone as soon as possible.  Submit refill requests through [employer] or call your pharmacy and they will forward the refill request to us. Please allow 3 business days for your refill to be completed.          Additional Information About Your Visit        [employer] Information     [employer] gives you secure access to your electronic health record. If you see a primary care provider, you can also send messages to your care team and make appointments. If you have questions, please call your primary care clinic.  If you do not have a primary care provider, please call [phone] and they will assist you.        Care EveryWhere ID     This is your Care EveryWhere ID. This could be used by other organizations to access your [place] medical records  [id number]        Your Vitals Were     Pulse Temperature Respirations Pulse Oximetry BMI (Body Mass Index)       94 97.6  F (36.4  C) 22 97% 30.24 kg/m2        Blood Pressure from Last 3 Encounters:   03/03/18 110/50   02/26/18 120/66   08/24/17 130/70    Weight from Last 3 Encounters:   03/03/18 196 lb (88.9 kg)   02/26/18 194 lb 12.8 oz (88.4 kg)   08/24/17 202 lb 11.2 oz (91.9 kg)              Today, you had the following     No orders found for display         Today's Medication Changes          These changes are accurate as of 3/3/18 11:38 AM.  If you have any questions, ask your nurse or doctor.               Start taking these medicines.        Dose/Directions    fluticasone 50 MCG/ACT spray   Commonly known as:  FLONASE   Used for:  Nasal congestion   Started by:  Kristy Saucedo APRN CNP        Dose:  1-2 spray   Spray 1-2 sprays into both nostrils daily   Quantity:  1 Bottle   Refills:  0            Where to get your medicines      These medications were sent to [facility] - [address]  [address] [address]     Phone:  [phone]      fluticasone 50 MCG/ACT spray                Primary Care Provider Office Phone # Fax #    Karley Irene [phone]       XXX RESIGNED XXX  [facility] [id number]        Equal Access to Services     ROBERTO MORENO : Hadii aad ku hadabigailo Soomaali, waaxda luqadaha, qaybta kaalmada adeegyada, waxdev cabreran aliaesther reeder winnie kaminski. So [facility] [phone].    ATENCIÓN: Si habla español, tiene a barr disposición servicios gratuitos de asistencia lingüística. Llame al [phone].    We comply with applicable federal civil rights laws and [place] laws. We do not discriminate on the basis of race, color, national origin, age, disability, sex, sexual orientation, or gender identity.            Thank you!     Thank you for choosing [facility]  for your care. Our goal is always to provide you with excellent care. Hearing back from our patients is one way we can continue to improve our services. Please take a few minutes to complete the written survey that you may receive in the mail after your visit with us. Thank you!             Your Updated Medication List - Protect others around you: Learn how to safely use, store and throw away your medicines at www.disposemymeds.org.          This list is accurate as of 3/3/18 11:38 AM.  Always use your most recent med list.                   Brand Name Dispense Instructions for use Diagnosis    ACCU-CHEK UCHE PLUS test strip   Generic drug:  blood glucose monitoring           albuterol 108 (90 BASE) MCG/ACT Inhaler    PROAIR HFA/PROVENTIL HFA/VENTOLIN HFA    1 Inhaler    Inhale 2 puffs into the lungs every 6 hours as needed for shortness of breath / dyspnea or wheezing    Acute bronchitis with symptoms > 10 days, Cough       amoxicillin-clavulanate 875-125 MG per tablet    AUGMENTIN    20 tablet    Take 1 tablet by mouth 2 times daily for 10 days    Acute serous otitis media of left ear, recurrence not specified, Acute bronchitis with  symptoms > 10 days       calcium-vitamin D 600-400 MG-UNIT per tablet    calcium 600 + D    100 tablet    Take 1 tablet by mouth daily with food FOR BONE HEALTH AND FOR VITAMIN D DEFICIENCY (LOW VITAMIN D)    Bariatric surgery status, Vitamin D deficiency, Recurrent kidney stones       Cyanocobalamin 5000 MCG/ML Liqd    B-12 SUPER STRENGTH    2 Bottle    Place 0.5 mLs under the tongue every other day FOR VITAMIN B12 SUPPLEMENTATION, PLEASE PLACE UNDER THE TONGUE    Bariatric surgery status, Vitamin B12 deficiency (non anemic)       ferrous fumarate 65 mg ([place]. FE)-Vitamin C 125 mg  MG Tabs tablet    VITRON C    90 tablet    Take 1 tablet by mouth every morning (before breakfast) INDICATION: IRON SUPPLEMENT    Bariatric surgery status, Iron deficiency anemia, unspecified iron deficiency anemia type       fluticasone 50 MCG/ACT spray    FLONASE    1 Bottle    Spray 1-2 sprays into both nostrils daily    Nasal congestion       folic acid 1 MG tablet    FOLVITE    100 tablet    Take 1 tablet (1 mg) by mouth daily INDICATION: FOLIC ACID SUPPLEMENT    Bariatric surgery status, Vitamin B12 deficiency (non anemic)       glimepiride 1 MG tablet    AMARYL    180 tablet    Take 1 tablet (1 mg) by mouth 2 times daily INDICATION: TO TREAT DIABETES    Type 2 diabetes mellitus with hyperglycemia, without long-term current use of insulin (H)       linagliptin-metFORMIN ER 5-1000 MG per tablet    JENTADUETO XR    90 tablet    TAKE ONE TABLET BY MOUTH EVERY DAY WITH FOOD    Type 2 diabetes mellitus with hyperglycemia, without long-term current use of insulin (H)       lisinopril 10 MG tablet    PRINIVIL/ZESTRIL    90 tablet    Take 1 tablet (10 mg) by mouth daily INDICATION:TO LOWER BLOOD PRESSURE AND TO PRESERVE KIDNEY FUNCTION    Type 2 diabetes mellitus with hyperglycemia, without long-term current use of insulin (H), Retinopathy due to secondary diabetes mellitus (H)       potassium bicarb & chloride 25 MEQ Tbef     90  tablet    Take 1 tablet (25 mEq) by mouth daily INDICATION: KIDNEY STONES    Uric acid kidney stone       pyridOXINE 25 MG tablet    vitamin B-6    45 tablet    Take 1 tablet (25 mg) by mouth every other day INDICATION: VITAMIN B6 DEFICIENCY    Bariatric surgery status, Vitamin B6 induced neuropathy (H)       tamsulosin 0.4 MG capsule    FLOMAX    90 capsule    Take 1 capsule (0.4 mg) by mouth daily INDICATION: BPH    Renal calculus       thiamine 100 MG tablet     90 tablet    Take 1 tablet (100 mg) by mouth daily INDICATION: LOW VITAMIN B1    Thiamine deficiency       Vitamin D3 70473 UNITS Tabs     90 tablet    Take 10,000 Units by mouth daily INDICATION:VITAMIN D DEFICIENCY (LOW VITAMIN D)    Bariatric surgery status, Vitamin D deficiency

## 2018-05-21 ENCOUNTER — OFFICE VISIT (OUTPATIENT)
Dept: INTERNAL MEDICINE | Facility: CLINIC | Age: 68
End: 2018-05-21
Payer: COMMERCIAL

## 2018-05-21 VITALS
OXYGEN SATURATION: 97 % | BODY MASS INDEX: 31.97 KG/M2 | RESPIRATION RATE: 16 BRPM | SYSTOLIC BLOOD PRESSURE: 134 MMHG | WEIGHT: 207.2 LBS | DIASTOLIC BLOOD PRESSURE: 84 MMHG | TEMPERATURE: 98 F | HEART RATE: 63 BPM

## 2018-05-21 DIAGNOSIS — N40.1 BENIGN PROSTATIC HYPERPLASIA WITH LOWER URINARY TRACT SYMPTOMS, SYMPTOM DETAILS UNSPECIFIED: ICD-10-CM

## 2018-05-21 DIAGNOSIS — E55.9 VITAMIN D DEFICIENCY: ICD-10-CM

## 2018-05-21 DIAGNOSIS — E11.42 TYPE 2 DIABETES MELLITUS WITH DIABETIC POLYNEUROPATHY, WITHOUT LONG-TERM CURRENT USE OF INSULIN (H): Primary | ICD-10-CM

## 2018-05-21 DIAGNOSIS — N20.0 URIC ACID KIDNEY STONE: ICD-10-CM

## 2018-05-21 DIAGNOSIS — Z23 NEED FOR VACCINATION: ICD-10-CM

## 2018-05-21 DIAGNOSIS — N20.0 RECURRENT KIDNEY STONES: ICD-10-CM

## 2018-05-21 DIAGNOSIS — I10 ESSENTIAL HYPERTENSION: ICD-10-CM

## 2018-05-21 DIAGNOSIS — Z98.84 BARIATRIC SURGERY STATUS: ICD-10-CM

## 2018-05-21 LAB
ALT SERPL W P-5'-P-CCNC: 38 U/L (ref 0–70)
ANION GAP SERPL CALCULATED.3IONS-SCNC: 8 MMOL/L (ref 3–14)
BUN SERPL-MCNC: 20 MG/DL (ref 7–30)
CALCIUM SERPL-MCNC: 8.5 MG/DL (ref 8.5–10.1)
CHLORIDE SERPL-SCNC: 106 MMOL/L (ref 94–109)
CHOLEST SERPL-MCNC: 152 MG/DL
CO2 SERPL-SCNC: 27 MMOL/L (ref 20–32)
CREAT SERPL-MCNC: 1.11 MG/DL (ref 0.66–1.25)
CREAT UR-MCNC: 125 MG/DL
GFR SERPL CREATININE-BSD FRML MDRD: 66 ML/MIN/1.7M2
GLUCOSE SERPL-MCNC: 174 MG/DL (ref 70–99)
HBA1C MFR BLD: 6.8 % (ref 0–5.6)
HDLC SERPL-MCNC: 27 MG/DL
LDLC SERPL CALC-MCNC: 85 MG/DL
MICROALBUMIN UR-MCNC: 24 MG/L
MICROALBUMIN/CREAT UR: 19.52 MG/G CR (ref 0–17)
NONHDLC SERPL-MCNC: 125 MG/DL
POTASSIUM SERPL-SCNC: 4.2 MMOL/L (ref 3.4–5.3)
SODIUM SERPL-SCNC: 141 MMOL/L (ref 133–144)
TRIGL SERPL-MCNC: 202 MG/DL
URATE SERPL-MCNC: 6.5 MG/DL (ref 3.5–7.2)

## 2018-05-21 PROCEDURE — 90471 IMMUNIZATION ADMIN: CPT | Performed by: INTERNAL MEDICINE

## 2018-05-21 PROCEDURE — 83036 HEMOGLOBIN GLYCOSYLATED A1C: CPT | Performed by: INTERNAL MEDICINE

## 2018-05-21 PROCEDURE — 84460 ALANINE AMINO (ALT) (SGPT): CPT | Performed by: INTERNAL MEDICINE

## 2018-05-21 PROCEDURE — 90732 PPSV23 VACC 2 YRS+ SUBQ/IM: CPT | Performed by: INTERNAL MEDICINE

## 2018-05-21 PROCEDURE — 99214 OFFICE O/P EST MOD 30 MIN: CPT | Mod: 25 | Performed by: INTERNAL MEDICINE

## 2018-05-21 PROCEDURE — 80061 LIPID PANEL: CPT | Performed by: INTERNAL MEDICINE

## 2018-05-21 PROCEDURE — 36415 COLL VENOUS BLD VENIPUNCTURE: CPT | Performed by: INTERNAL MEDICINE

## 2018-05-21 PROCEDURE — 80048 BASIC METABOLIC PNL TOTAL CA: CPT | Performed by: INTERNAL MEDICINE

## 2018-05-21 PROCEDURE — 82043 UR ALBUMIN QUANTITATIVE: CPT | Performed by: INTERNAL MEDICINE

## 2018-05-21 PROCEDURE — 84550 ASSAY OF BLOOD/URIC ACID: CPT | Performed by: INTERNAL MEDICINE

## 2018-05-21 RX ORDER — LISINOPRIL 20 MG/1
20 TABLET ORAL DAILY
Qty: 90 TABLET | Refills: 1 | Status: SHIPPED | OUTPATIENT
Start: 2018-05-21 | End: 2018-09-27

## 2018-05-21 RX ORDER — GLIMEPIRIDE 1 MG/1
1 TABLET ORAL 2 TIMES DAILY
Qty: 180 TABLET | Refills: 0 | Status: SHIPPED | OUTPATIENT
Start: 2018-05-21 | End: 2018-09-19

## 2018-05-21 RX ORDER — CHOLECALCIFEROL (VITAMIN D3) 50 MCG
2000 TABLET ORAL DAILY
Qty: 90 TABLET | Refills: 3 | Status: SHIPPED | OUTPATIENT
Start: 2018-05-21

## 2018-05-21 RX ORDER — TAMSULOSIN HYDROCHLORIDE 0.4 MG/1
0.4 CAPSULE ORAL DAILY
Qty: 90 CAPSULE | Refills: 1 | Status: SHIPPED | OUTPATIENT
Start: 2018-05-21 | End: 2018-09-27

## 2018-05-21 NOTE — PROGRESS NOTES
SUBJECTIVE:   Los Renee is a 67 year old male who presents to clinic today for the following health issues:    Patient previously seen here by a provider who is no longer on staff.  He states he has a history of gastric bypass surgery for morbid obesity and type 2 diabetes.  Since his surgery is been off insulin and only on metformin, linagliptin and glimepiride.    Diabetes Follow-up  Lab Results   Component Value Date    A1C 6.8 05/21/2018    A1C 6.9 08/12/2017    A1C 7.3 01/31/2017    A1C 7.3 09/28/2016    A1C 8.7 09/30/2010       Patient is checking blood sugars: not at all    Diabetic concerns: None     Symptoms of hypoglycemia (low blood sugar): none     Paresthesias (numbness or burning in feet) or sores: Yes     Date of last diabetic eye exam: 9-17    BP Readings from Last 2 Encounters:   05/21/18 134/84   03/03/18 110/50     Hemoglobin A1C (%)   Date Value   05/21/2018 6.8 (H)   08/12/2017 6.9 (H)     LDL Cholesterol Calculated (mg/dL)   Date Value   05/21/2018 85   01/31/2017 84       Hypertension Follow-up  BP Readings from Last 3 Encounters:   05/21/18 134/84   03/03/18 110/50   02/26/18 120/66        Outpatient blood pressures are not being checked.    Low Salt Diet: low salt      Amount of exercise or physical activity: 2-3 days/week for an average of less than 15 minutes    Problems taking medications regularly: No    Medication side effects: none    Diet: regular (no restrictions)    Problem list and histories reviewed & adjusted, as indicated.  Additional history: as documented    Labs reviewed in EPIC    Reviewed and updated as needed this visit by clinical staff  Tobacco  Allergies  Meds  Fam Hx  Soc Hx      Reviewed and updated as needed this visit by Provider  Tobacco  Fam Hx  Soc Hx        ROS:  Constitutional, HEENT, cardiovascular, pulmonary, gi and gu systems are negative, except as otherwise noted.    OBJECTIVE:                                                    /84   Pulse 63  Temp 98  F (36.7  C) (Oral)  Resp 16  Wt 207 lb 3.2 oz (94 kg)  SpO2 97%  BMI 31.97 kg/m2  Body mass index is 31.97 kg/(m^2).   2 of 3  systolic readings in the 140 range on recheck  GENERAL APPEARANCE: alert, no distress and over weight  EYES: Eyes grossly normal to inspection, PERRL and conjunctivae and sclerae normal  HENT: nose and mouth without ulcers or lesions and normal cephalic/atraumatic  NECK: no adenopathy, no asymmetry, masses, or scars and thyroid normal to palpation  RESP: lungs clear to auscultation - no rales, rhonchi or wheezes  CV: regular rates and rhythm, normal S1 S2, no S3 or S4 and no murmur, click or rub  MS: extremities normal- no gross deformities noted  SKIN: no suspicious lesions or rashes  NEURO: Normal strength and tone, mentation intact and speech normal  DIABETIC FOOT EXAM: normal DP and PT pulses, no trophic changes or ulcerative lesions and reduced sensation at left greater than right foot plantar surface  PSYCH: mentation appears normal and affect normal/bright    Diagnostic test results:  Results for orders placed or performed in visit on 05/21/18 (from the past 24 hour(s))   ALT   Result Value Ref Range    ALT 38 0 - 70 U/L   Hemoglobin A1c   Result Value Ref Range    Hemoglobin A1C 6.8 (H) 0 - 5.6 %   Lipid panel reflex to direct LDL Fasting   Result Value Ref Range    Cholesterol 152 <200 mg/dL    Triglycerides 202 (H) <150 mg/dL    HDL Cholesterol 27 (L) >39 mg/dL    LDL Cholesterol Calculated 85 <100 mg/dL    Non HDL Cholesterol 125 <130 mg/dL   Basic metabolic panel   Result Value Ref Range    Sodium 141 133 - 144 mmol/L    Potassium 4.2 3.4 - 5.3 mmol/L    Chloride 106 94 - 109 mmol/L    Carbon Dioxide 27 20 - 32 mmol/L    Anion Gap 8 3 - 14 mmol/L    Glucose 174 (H) 70 - 99 mg/dL    Urea Nitrogen 20 7 - 30 mg/dL    Creatinine 1.11 0.66 - 1.25 mg/dL    GFR Estimate 66 >60 mL/min/1.7m2    GFR Estimate If Black 80 >60 mL/min/1.7m2    Calcium 8.5 8.5 - 10.1  mg/dL   Uric acid   Result Value Ref Range    Uric Acid 6.5 3.5 - 7.2 mg/dL   Albumin Random Urine Quantitative with Creat Ratio   Result Value Ref Range    Creatinine Urine 125 mg/dL    Albumin Urine mg/L 24 mg/L    Albumin Urine mg/g Cr 19.52 (H) 0 - 17 mg/g Cr        ASSESSMENT/PLAN:                                                    1. Type 2 diabetes mellitus with diabetic polyneuropathy, without long-term current use of insulin (H)  We will try to increase his metformin to 2 g a day.  Long-term I had like to reduce his glimepiride possibly eliminate it as long as his A1c stays well controlled.  We could consider substitution of a SGLT 2 inhibitor or GLP agonist    - ALT  - Albumin Random Urine Quantitative with Creat Ratio  - Hemoglobin A1c  - Lipid panel reflex to direct LDL Fasting  - Basic metabolic panel    2. Recurrent kidney stones  Continue alkalinization of the urine  - Uric acid    3. Need for vaccination  - PNEUMOCOCCAL VACCINE,ADULT,SQ OR IM  - VACCINE ADMINISTRATION, INITIAL    Hypertension  Increase lisinopril to 20 mg given the small amount of microalbuminuria as well as the elevated numbers.    Gastric bypass surgery status  We should be able to combine all his B vitamin supplements with a B complex supplement obtain over-the-counter  He also needs low-dose iron supplementation which again can be given with multivitamin   reduce his vitamin D to 2000 units daily  B12 continue  Art Cheung MD  [facility]

## 2018-05-21 NOTE — MR AVS SNAPSHOT
After Visit Summary   5/21/2018    Los Renee    MRN: [id number]           Patient Information     Date Of Birth          1950        Visit Information        Provider Department      5/21/2018 7:00 AM Art Cheung MD [facility]        Today's Diagnoses     Type 2 diabetes mellitus with diabetic polyneuropathy, without long-term current use of insulin (H)    -  1    Recurrent kidney stones           Follow-ups after your visit        Who to contact     If you have questions or need follow up information about today's clinic visit or your schedule please contact [facility] directly at [phone].  Normal or non-critical lab and imaging results will be communicated to you by MyChart, letter or phone within 4 business days after the clinic has received the results. If you do not hear from us within 7 days, please contact the clinic through [employer] or phone. If you have a critical or abnormal lab result, we will notify you by phone as soon as possible.  Submit refill requests through [employer] or call your pharmacy and they will forward the refill request to us. Please allow 3 business days for your refill to be completed.          Additional Information About Your Visit        MyChart Information     [employer] gives you secure access to your electronic health record. If you see a primary care provider, you can also send messages to your care team and make appointments. If you have questions, please call your primary care clinic.  If you do not have a primary care provider, please call [phone] and they will assist you.        Care EveryWhere ID     This is your Care EveryWhere ID. This could be used by other organizations to access your [place] medical records  [id number]        Your Vitals Were     Pulse Temperature Respirations Pulse Oximetry BMI (Body Mass Index)       63 98  F (36.7  C) (Oral) 16 97% 31.97 kg/m2        Blood  Pressure from Last 3 Encounters:   05/21/18 134/84   03/03/18 110/50   02/26/18 120/66    Weight from Last 3 Encounters:   05/21/18 207 lb 3.2 oz (94 kg)   03/03/18 196 lb (88.9 kg)   02/26/18 194 lb 12.8 oz (88.4 kg)              We Performed the Following     Albumin Random Urine Quantitative with Creat Ratio     ALT     Basic metabolic panel     Hemoglobin A1c     Lipid panel reflex to direct LDL Fasting     Uric acid        Primary Care Provider    None Specified       No primary provider on file.        Equal Access to Services     ROBERTO MORENO : Hadii randall ku hadasho Souri, waaxda luqadaha, qaybta kaalmada daneyamaeve, josefa zhou . So [facility] [phone].    ATENCIÓN: Si habla español, tiene a barr disposición servicios gratuitos de asistencia lingüística. Llame al [phone].    We comply with applicable federal civil rights laws and [place] laws. We do not discriminate on the basis of race, color, national origin, age, disability, sex, sexual orientation, or gender identity.            Thank you!     Thank you for choosing [facility]  for your care. Our goal is always to provide you with excellent care. Hearing back from our patients is one way we can continue to improve our services. Please take a few minutes to complete the written survey that you may receive in the mail after your visit with us. Thank you!             Your Updated Medication List - Protect others around you: Learn how to safely use, store and throw away your medicines at www.disposemymeds.org.          This list is accurate as of 5/21/18  7:48 AM.  Always use your most recent med list.                   Brand Name Dispense Instructions for use Diagnosis    ACCU-CHEK UCHE PLUS test strip   Generic drug:  blood glucose monitoring           calcium-vitamin D 600-400 MG-UNIT per tablet    calcium 600 + D    100 tablet    Take 1 tablet by mouth daily with food FOR BONE HEALTH AND FOR  VITAMIN D DEFICIENCY (LOW VITAMIN D)    Bariatric surgery status, Vitamin D deficiency, Recurrent kidney stones       Cyanocobalamin 5000 MCG/ML Liqd    B-12 SUPER STRENGTH    2 Bottle    Place 0.5 mLs under the tongue every other day FOR VITAMIN B12 SUPPLEMENTATION, PLEASE PLACE UNDER THE TONGUE    Bariatric surgery status, Vitamin B12 deficiency (non anemic)       ferrous fumarate 65 mg ([place]. FE)-Vitamin C 125 mg  MG Tabs tablet    VITRON C    90 tablet    Take 1 tablet by mouth every morning (before breakfast) INDICATION: IRON SUPPLEMENT    Bariatric surgery status, Iron deficiency anemia, unspecified iron deficiency anemia type       folic acid 1 MG tablet    FOLVITE    100 tablet    Take 1 tablet (1 mg) by mouth daily INDICATION: FOLIC ACID SUPPLEMENT    Bariatric surgery status, Vitamin B12 deficiency (non anemic)       glimepiride 1 MG tablet    AMARYL    180 tablet    Take 1 tablet (1 mg) by mouth 2 times daily INDICATION: TO TREAT DIABETES    Type 2 diabetes mellitus with hyperglycemia, without long-term current use of insulin (H)       linagliptin-metFORMIN ER 5-1000 MG per tablet    JENTADUETO XR    90 tablet    TAKE ONE TABLET BY MOUTH EVERY DAY WITH FOOD    Type 2 diabetes mellitus with hyperglycemia, without long-term current use of insulin (H)       lisinopril 10 MG tablet    PRINIVIL/ZESTRIL    90 tablet    Take 1 tablet (10 mg) by mouth daily INDICATION:TO LOWER BLOOD PRESSURE AND TO PRESERVE KIDNEY FUNCTION    Type 2 diabetes mellitus with hyperglycemia, without long-term current use of insulin (H), Retinopathy due to secondary diabetes mellitus (H)       potassium bicarb & chloride 25 MEQ Tbef     90 tablet    Take 1 tablet (25 mEq) by mouth daily INDICATION: KIDNEY STONES    Uric acid kidney stone       pyridOXINE 25 MG tablet    vitamin B-6    45 tablet    Take 1 tablet (25 mg) by mouth every other day INDICATION: VITAMIN B6 DEFICIENCY    Bariatric surgery status, Vitamin B6 induced  neuropathy (H)       tamsulosin 0.4 MG capsule    FLOMAX    90 capsule    Take 1 capsule (0.4 mg) by mouth daily INDICATION: BPH    Renal calculus       thiamine 100 MG tablet     90 tablet    Take 1 tablet (100 mg) by mouth daily INDICATION: LOW VITAMIN B1    Thiamine deficiency       Vitamin D3 26122 units Tabs     90 tablet    Take 10,000 Units by mouth daily INDICATION:VITAMIN D DEFICIENCY (LOW VITAMIN D)    Bariatric surgery status, Vitamin D deficiency

## 2018-05-21 NOTE — NURSING NOTE
Screening Questionnaire for Adult Immunization    Are you sick today?   No   Do you have allergies to medications, food, a vaccine component or latex?   No   Have you ever had a serious reaction after receiving a vaccination?   No   Do you have a long-term health problem with heart disease, lung disease, asthma, kidney disease, metabolic disease (e.g. diabetes), anemia, or other blood disorder?   Yes   Do you have cancer, leukemia, HIV/AIDS, or any other immune system problem?   No   In the past 3 months, have you taken medications that affect  your immune system, such as prednisone, other steroids, or anticancer drugs; drugs for the treatment of rheumatoid arthritis, Crohn s disease, or psoriasis; or have you had radiation treatments?   No   Have you had a seizure, or a brain or other nervous system problem?   No   During the past year, have you received a transfusion of blood or blood     products, or been given immune (gamma) globulin or antiviral drug?   No   For women: Are you pregnant or is there a chance you could become        pregnant during the next month?   No   Have you received any vaccinations in the past 4 weeks?   No     Immunization questionnaire was positive for at least one answer.  Notified Dr. Cheung.        Per orders of Dr. Cheung, injection of Pneumococcal 23 given by Cyn Dixon. Patient instructed to remain in clinic for 15 minutes afterwards, and to report any adverse reaction to me immediately.       Screening performed by Cyn Dixon on 5/21/2018 at 7:50 AM.

## 2018-05-24 ENCOUNTER — TELEPHONE (OUTPATIENT)
Dept: INTERNAL MEDICINE | Facility: CLINIC | Age: 68
End: 2018-05-24

## 2018-05-24 DIAGNOSIS — Z12.11 COLON CANCER SCREENING: Primary | ICD-10-CM

## 2018-05-24 NOTE — TELEPHONE ENCOUNTER
Letter printed and mailed.     Colonoscopy order needed sent to Dr. Cheung to order and sign since patient just recently seen him.

## 2018-05-24 NOTE — LETTER
[facility]  [address] [address]  [phone]  May 24, 2018    Los Renee  [address] [place] [id number]    Dear Los,    We care about your health and based on a review of your medical records, recommend the the following, to better manage your health:      You are in particular need of attention regarding:  -Colon Cancer Screening  -Eye Exam     I am recommending that you:     -schedule a COLONOSCOPY to look for colon cancer (due every 10 years or 5 years in higher risk situations.)        Colon cancer is now the second leading cause of cancer-related deaths in the [place] for both men and women and there are over 130,000 new cases and 50,000 deaths per year from colon cancer.  Colonoscopies can prevent 90-95% of these deaths.  Problem lesions can be removed before they ever become cancer.  This test is not only looking for cancer, but also getting rid of precancerious lesions.    If you are under/uninsured, we recommend you contact the Radiation Watch program. Radiation Watch is a free colorectal cancer screening program that provides colonoscopies for eligible under/uninsured [place] men and women. If you are interested in receiving a free colonoscopy, please call [employer] Watch at [phone] (mention code Sc[employer]Web) to see if you re eligible.      If you do not wish to do a colonoscopy or cannot afford to do one, at this time, there is another option. It is called a FIT test or Fecal Immunochemical Occult Blood Test (take home stool sample kit).  It does not replace the colonoscopy for colorectal cancer screening, but it can detect hidden bleeding in the lower colon.  It does need to be repeated every year and if a positive result is obtained, you would be referred for a colonoscopy.          If you have completed either one of these tests at another facility, please call with the details of when and where the  tests were done and if they were normal or not. Or have the records sent to our clinic so that we can best coordinate your care.      Here is a list of Health Maintenance topics that are due now or due soon:  Health Maintenance Due   Topic Date Due           Eye Exam - yearly  10/06/2017     Colon Cancer Screening - every 10 years.  03/06/2018       Please call us at [phone] or [phone] (or use [phone]) to address the above recommendations.     Thank you for trusting [facility].  We appreciate the opportunity to serve you and look forward to supporting your healthcare needs in the future.    If you have (or plan to have) any of these tests done at a facility other than a [facility] or a [facility], please have the results from these tests sent to your primary physician at [facility].    Healthy Regards,    Art Cheung MD

## 2018-07-05 ENCOUNTER — HOSPITAL ENCOUNTER (OUTPATIENT)
Facility: CLINIC | Age: 68
Discharge: HOME | End: 2018-07-05
Attending: INTERNAL MEDICINE | Admitting: INTERNAL MEDICINE
Payer: COMMERCIAL

## 2018-07-05 VITALS
RESPIRATION RATE: 18 BRPM | SYSTOLIC BLOOD PRESSURE: 127 MMHG | OXYGEN SATURATION: 95 % | DIASTOLIC BLOOD PRESSURE: 75 MMHG | BODY MASS INDEX: 31.07 KG/M2 | HEIGHT: 68 IN | WEIGHT: 205 LBS

## 2018-07-05 LAB — COLONOSCOPY: NORMAL

## 2018-07-05 PROCEDURE — 88305 TISSUE EXAM BY PATHOLOGIST: CPT | Performed by: INTERNAL MEDICINE

## 2018-07-05 PROCEDURE — 88305 TISSUE EXAM BY PATHOLOGIST: CPT | Mod: 26 | Performed by: INTERNAL MEDICINE

## 2018-07-05 PROCEDURE — G0500 MOD SEDAT ENDO SERVICE >5YRS: HCPCS | Performed by: INTERNAL MEDICINE

## 2018-07-05 PROCEDURE — 25000128 H RX IP 250 OP 636: Performed by: INTERNAL MEDICINE

## 2018-07-05 PROCEDURE — 45380 COLONOSCOPY AND BIOPSY: CPT | Performed by: INTERNAL MEDICINE

## 2018-07-05 RX ORDER — FENTANYL CITRATE 50 UG/ML
INJECTION, SOLUTION INTRAMUSCULAR; INTRAVENOUS PRN
Status: DISCONTINUED | OUTPATIENT
Start: 2018-07-05 | End: 2018-07-05 | Stop reason: HOSPADM

## 2018-07-05 RX ORDER — SODIUM CHLORIDE 9 MG/ML
INJECTION, SOLUTION INTRAVENOUS CONTINUOUS PRN
Status: DISCONTINUED | OUTPATIENT
Start: 2018-07-05 | End: 2018-07-05 | Stop reason: HOSPADM

## 2018-07-06 LAB — COPATH REPORT: NORMAL

## 2018-09-19 DIAGNOSIS — E11.42 TYPE 2 DIABETES MELLITUS WITH DIABETIC POLYNEUROPATHY, WITHOUT LONG-TERM CURRENT USE OF INSULIN (H): ICD-10-CM

## 2018-09-19 RX ORDER — GLIMEPIRIDE 1 MG/1
1 TABLET ORAL 2 TIMES DAILY
Qty: 180 TABLET | Refills: 0 | Status: SHIPPED | OUTPATIENT
Start: 2018-09-19 | End: 2018-09-27

## 2018-09-19 NOTE — TELEPHONE ENCOUNTER
Reason for Call:  Medication or medication refill:    Do you use a [facility]?  Name of the pharmacy and phone number for the current request:  [facility]    Name of the medication requested: glimepiride    Other request: Pt has been out of medication for a week.  Pt made an appt with Dr Cheung for 9/27/18.    Can we leave a detailed message on this number? YES    Phone number patient can be reached at: Home number on file [phone] (home)    Best Time: anytime    Call taken on 9/19/2018 at 7:46 AM by YAS BARBOUR

## 2018-09-19 NOTE — TELEPHONE ENCOUNTER
"Requested Prescriptions   Pending Prescriptions Disp Refills     glimepiride (AMARYL) 1 MG tablet 180 tablet 0     Sig: Take 1 tablet (1 mg) by mouth 2 times daily    Sulfonylurea Agents Passed    9/19/2018 10:20 AM       Passed - Blood pressure less than 140/90 in past 6 months    BP Readings from Last 3 Encounters:   07/05/18 127/75   05/21/18 134/84   03/03/18 110/50                Passed - Patient has documented LDL within the past 12 mos.    Recent Labs   Lab Test  05/21/18   0753   LDL  85            Passed - Patient has had a Microalbumin in the past 12 mos.    Recent Labs   Lab Test  05/21/18   0807   MICROL  24   UMALCR  19.52*            Passed - Patient has documented A1c within the specified period of time.    If HgbA1C is 8 or greater, it needs to be on file within the past 3 months.  If less than 8, must be on file within the past 6 months.     Recent Labs   Lab Test  05/21/18   0753   A1C  6.8*            Passed - Patient is age 18 or older       Passed - Patient has a recent creatinine (normal) within the past 12 mos.    Recent Labs   Lab Test  05/21/18   0753   10/05/16   1221   CR  1.11   < >   --    CREAT   --    --   1.3*    < > = values in this interval not displayed.            Passed - Recent (6 mo) or future (30 days) visit within the authorizing provider's specialty    Patient had office visit in the last 6 months or has a visit in the next 30 days with authorizing provider or within the authorizing provider's specialty.  See \"Patient Info\" tab in inbasket, or \"Choose Columns\" in Meds & Orders section of the refill encounter.            Prescription approved per [facility] Refill Protocol.    "

## 2018-09-27 ENCOUNTER — OFFICE VISIT (OUTPATIENT)
Dept: INTERNAL MEDICINE | Facility: CLINIC | Age: 68
End: 2018-09-27
Payer: COMMERCIAL

## 2018-09-27 VITALS
RESPIRATION RATE: 14 BRPM | TEMPERATURE: 97.8 F | DIASTOLIC BLOOD PRESSURE: 62 MMHG | BODY MASS INDEX: 29.91 KG/M2 | SYSTOLIC BLOOD PRESSURE: 124 MMHG | WEIGHT: 196.7 LBS | OXYGEN SATURATION: 99 % | HEART RATE: 82 BPM

## 2018-09-27 DIAGNOSIS — I10 ESSENTIAL HYPERTENSION: ICD-10-CM

## 2018-09-27 DIAGNOSIS — E11.42 TYPE 2 DIABETES MELLITUS WITH DIABETIC POLYNEUROPATHY, WITHOUT LONG-TERM CURRENT USE OF INSULIN (H): Primary | ICD-10-CM

## 2018-09-27 DIAGNOSIS — Z98.84 BARIATRIC SURGERY STATUS: ICD-10-CM

## 2018-09-27 DIAGNOSIS — N40.1 BENIGN PROSTATIC HYPERPLASIA WITH LOWER URINARY TRACT SYMPTOMS, SYMPTOM DETAILS UNSPECIFIED: ICD-10-CM

## 2018-09-27 LAB
FERRITIN SERPL-MCNC: 180 NG/ML (ref 26–388)
HBA1C MFR BLD: 5.7 % (ref 0–5.6)
HGB BLD-MCNC: 12.6 G/DL (ref 13.3–17.7)

## 2018-09-27 PROCEDURE — 36415 COLL VENOUS BLD VENIPUNCTURE: CPT | Performed by: INTERNAL MEDICINE

## 2018-09-27 PROCEDURE — 99214 OFFICE O/P EST MOD 30 MIN: CPT | Performed by: INTERNAL MEDICINE

## 2018-09-27 PROCEDURE — 85018 HEMOGLOBIN: CPT | Performed by: INTERNAL MEDICINE

## 2018-09-27 PROCEDURE — 83036 HEMOGLOBIN GLYCOSYLATED A1C: CPT | Performed by: INTERNAL MEDICINE

## 2018-09-27 PROCEDURE — 82728 ASSAY OF FERRITIN: CPT | Performed by: INTERNAL MEDICINE

## 2018-09-27 RX ORDER — SIMVASTATIN 20 MG
20 TABLET ORAL AT BEDTIME
Qty: 90 TABLET | Refills: 1 | Status: SHIPPED | OUTPATIENT
Start: 2018-09-27 | End: 2018-09-27

## 2018-09-27 RX ORDER — BLOOD SUGAR DIAGNOSTIC
1 STRIP MISCELLANEOUS DAILY
Qty: 100 STRIP | Refills: 11 | Status: SHIPPED | OUTPATIENT
Start: 2018-09-27 | End: 2020-03-09

## 2018-09-27 RX ORDER — LISINOPRIL 20 MG/1
20 TABLET ORAL DAILY
Qty: 90 TABLET | Refills: 1 | Status: SHIPPED | OUTPATIENT
Start: 2018-09-27 | End: 2019-05-17

## 2018-09-27 RX ORDER — TAMSULOSIN HYDROCHLORIDE 0.4 MG/1
0.4 CAPSULE ORAL DAILY
Qty: 90 CAPSULE | Refills: 3 | Status: SHIPPED | OUTPATIENT
Start: 2018-09-27 | End: 2019-12-01

## 2018-09-27 RX ORDER — SIMVASTATIN 20 MG
20 TABLET ORAL AT BEDTIME
Qty: 90 TABLET | Refills: 1 | Status: SHIPPED | OUTPATIENT
Start: 2018-09-27 | End: 2019-05-17 | Stop reason: SINTOL

## 2018-09-27 RX ORDER — CHOLECALCIFEROL (VITAMIN D3) 125 MCG
1 CAPSULE ORAL DAILY
COMMUNITY

## 2018-09-27 RX ORDER — GLIMEPIRIDE 1 MG/1
1 TABLET ORAL
Qty: 90 TABLET | Refills: 1 | Status: SHIPPED | OUTPATIENT
Start: 2018-09-27 | End: 2019-05-17 | Stop reason: DRUGHIGH

## 2018-09-27 NOTE — MR AVS SNAPSHOT
After Visit Summary   9/27/2018    Los Renee    MRN: [id number]           Patient Information     Date Of Birth          1950        Visit Information        Provider Department      9/27/2018 7:00 AM Art Cheung MD [facility]        Today's Diagnoses     Type 2 diabetes mellitus with diabetic polyneuropathy, without long-term current use of insulin (H)    -  1    Bariatric surgery status           Follow-ups after your visit        Who to contact     If you have questions or need follow up information about today's clinic visit or your schedule please contact [facility] directly at [phone].  Normal or non-critical lab and imaging results will be communicated to you by MyChart, letter or phone within 4 business days after the clinic has received the results. If you do not hear from us within 7 days, please contact the clinic through Gift Card Comb[employer] or phone. If you have a critical or abnormal lab result, we will notify you by phone as soon as possible.  Submit refill requests through PrepClass or call your pharmacy and they will forward the refill request to us. Please allow 3 business days for your refill to be completed.          Additional Information About Your Visit        MyChart Information     PrepClass gives you secure access to your electronic health record. If you see a primary care provider, you can also send messages to your care team and make appointments. If you have questions, please call your primary care clinic.  If you do not have a primary care provider, please call [phone] and they will assist you.        Care EveryWhere ID     This is your Care EveryWhere ID. This could be used by other organizations to access your [place] medical records  [id number]        Your Vitals Were     Pulse Temperature Respirations Pulse Oximetry BMI (Body Mass Index)       82 97.8  F (36.6  C) (Oral) 14 99% 29.91 kg/m2        Blood  Pressure from Last 3 Encounters:   09/27/18 124/62   07/05/18 127/75   05/21/18 134/84    Weight from Last 3 Encounters:   09/27/18 196 lb 11.2 oz (89.2 kg)   07/05/18 205 lb (93 kg)   05/21/18 207 lb 3.2 oz (94 kg)              We Performed the Following     Ferritin     Hemoglobin A1c     Hemoglobin          Today's Medication Changes          These changes are accurate as of 9/27/18  7:38 AM.  If you have any questions, ask your nurse or doctor.               Start taking these medicines.        Dose/Directions    simvastatin 20 MG tablet   Commonly known as:  ZOCOR   Used for:  Type 2 diabetes mellitus with diabetic polyneuropathy, without long-term current use of insulin (H)   Started by:  Art Cheung MD        Dose:  20 mg   Take 1 tablet (20 mg) by mouth At Bedtime   Quantity:  90 tablet   Refills:  1         These medicines have changed or have updated prescriptions.        Dose/Directions    ACCU-CHEK UCHE PLUS test strip   This may have changed:    - how much to take  - how to take this  - when to take this   Used for:  Type 2 diabetes mellitus with diabetic polyneuropathy, without long-term current use of insulin (H)   Generic drug:  blood glucose monitoring   Changed by:  Art Cheung MD        Dose:  1 strip   1 strip by In Vitro route daily   Quantity:  100 strip   Refills:  11       vitamin D 2000 units tablet   This may have changed:  how much to take   Used for:  Bariatric surgery status, Vitamin D deficiency        Dose:  2000 Units   Take 2,000 Units by mouth daily   Quantity:  90 tablet   Refills:  3         Stop taking these medicines if you haven't already. Please contact your care team if you have questions.     potassium bicarbonate 25 MEQ solu-tab   Commonly known as:  K-LYTE   Stopped by:  Art Cheung MD                Where to get your medicines      These medications were sent to [facility] [address]  [address]  [place] [address]     Phone:  [phone]     ACCU-CHEK UCHE PLUS test strip    simvastatin 20 MG tablet                Primary Care Provider Fax #    Physician No Ref-Primary [phone]       No address on file        Equal Access to Services     ROBERTO MORENO : Jaydon randall leach shanna Pinto, waaxda luqadaha, qaybta kaalmada adebrandee, josefa reeder laGisellebahman kaminski. So [facility] [phone].    ATENCIÓN: Si habla español, tiene a barr disposición servicios gratuitos de asistencia lingüística. Llame al [phone].    We comply with applicable federal civil rights laws and [place] laws. We do not discriminate on the basis of race, color, national origin, age, disability, sex, sexual orientation, or gender identity.            Thank you!     Thank you for choosing [facility]  for your care. Our goal is always to provide you with excellent care. Hearing back from our patients is one way we can continue to improve our services. Please take a few minutes to complete the written survey that you may receive in the mail after your visit with us. Thank you!             Your Updated Medication List - Protect others around you: Learn how to safely use, store and throw away your medicines at www.disposemymeds.org.          This list is accurate as of 9/27/18  7:38 AM.  Always use your most recent med list.                   Brand Name Dispense Instructions for use Diagnosis    ACCU-CHEK UCHE PLUS test strip   Generic drug:  blood glucose monitoring     100 strip    1 strip by In Vitro route daily    Type 2 diabetes mellitus with diabetic polyneuropathy, without long-term current use of insulin (H)       calcium carbonate 600 mg-vitamin D 400 units 600-400 MG-UNIT per tablet    calcium 600 + D    100 tablet    Take 1 tablet by mouth daily    Bariatric surgery status, Vitamin D deficiency, Recurrent kidney stones       Cyanocobalamin 5000 MCG/ML Liqd    B-12 SUPER STRENGTH    2 Bottle    Place 0.5  mLs under the tongue every other day FOR VITAMIN B12 SUPPLEMENTATION, PLEASE PLACE UNDER THE TONGUE    Bariatric surgery status, Vitamin B12 deficiency (non anemic)       glimepiride 1 MG tablet    AMARYL    180 tablet    Take 1 tablet (1 mg) by mouth 2 times daily    Type 2 diabetes mellitus with diabetic polyneuropathy, without long-term current use of insulin (H)       linagliptin-metFORMIN ER 2.5-1000 MG per table    JENTADUETO XR    180 tablet    Take 2 tablets by mouth daily with food    Type 2 diabetes mellitus with diabetic polyneuropathy, without long-term current use of insulin (H)       lisinopril 20 MG tablet    PRINIVIL/ZESTRIL    90 tablet    Take 1 tablet (20 mg) by mouth daily New dose    Type 2 diabetes mellitus with diabetic polyneuropathy, without long-term current use of insulin (H), Essential hypertension       potassium bicarb & chloride 25 MEQ Tbef     90 tablet    Take 1 tablet (25 mEq) by mouth daily    Uric acid kidney stone, Recurrent kidney stones       PROBIOTIC ACIDOPHILUS PO           simvastatin 20 MG tablet    ZOCOR    90 tablet    Take 1 tablet (20 mg) by mouth At Bedtime    Type 2 diabetes mellitus with diabetic polyneuropathy, without long-term current use of insulin (H)       tamsulosin 0.4 MG capsule    FLOMAX    90 capsule    Take 1 capsule (0.4 mg) by mouth daily    Benign prostatic hyperplasia with lower urinary tract symptoms, symptom details unspecified       vitamin D 2000 units tablet     90 tablet    Take 2,000 Units by mouth daily    Bariatric surgery status, Vitamin D deficiency

## 2018-09-27 NOTE — PROGRESS NOTES
SUBJECTIVE:   Los Renee is a 68 year old male who presents to clinic today for the following health issues:      Diabetes Follow-up  -Patient believes his blood sugars have been relatively good.  He has been watching his urinary habits and states that in the past when his blood sugars were high he would be urinating more.  He states this is not been the case.    Patient is checking blood sugars: not at all    Diabetic concerns: None     Symptoms of hypoglycemia (low blood sugar): none     Paresthesias (numbness or burning in feet) or sores: Yes      Date of last diabetic eye exam: 9-17    BP Readings from Last 2 Encounters:   07/05/18 127/75   05/21/18 134/84     Hemoglobin A1C (%)   Date Value   05/21/2018 6.8 (H)   08/12/2017 6.9 (H)     LDL Cholesterol Calculated (mg/dL)   Date Value   05/21/2018 85   01/31/2017 84       Diabetes Management Resources    Amount of exercise or physical activity: None    Problems taking medications regularly: No    Medication side effects: none    Diet: regular (no restrictions)        Problem list and histories reviewed & adjusted, as indicated.  Additional history: as documented    Labs reviewed in EPIC    Reviewed and updated as needed this visit by clinical staff       Reviewed and updated as needed this visit by Provider         ROS:  Constitutional, HEENT, cardiovascular, pulmonary, gi and gu systems are negative, except as otherwise noted.    OBJECTIVE:                                                    /62  Pulse 82  Temp 97.8  F (36.6  C) (Oral)  Resp 14  Wt 196 lb 11.2 oz (89.2 kg)  SpO2 99%  BMI 29.91 kg/m2  Body mass index is 29.91 kg/(m^2).  GENERAL APPEARANCE: alert and no distress  HENT: nose and mouth without ulcers or lesions and normal cephalic/atraumatic  NECK: no adenopathy, no asymmetry, masses, or scars and thyroid normal to palpation  RESP: lungs clear to auscultation - no rales, rhonchi or wheezes  CV: regular rates and rhythm, normal S1  S2, no S3 or S4 and no murmur, click or rub  MS: extremities normal- no gross deformities noted  SKIN: no suspicious lesions or rashes  DIABETIC FOOT EXAM: normal DP and PT pulses, no trophic changes or ulcerative lesions and reduced sensation at multiple plantar surface areas    Diagnostic test results:  Results for orders placed or performed in visit on 09/27/18   Hemoglobin A1c   Result Value Ref Range    Hemoglobin A1C 5.7 (H) 0 - 5.6 %   Hemoglobin   Result Value Ref Range    Hemoglobin 12.6 (L) 13.3 - 17.7 g/dL   Ferritin   Result Value Ref Range    Ferritin 180 26 - 388 ng/mL         ASSESSMENT/PLAN:                                                    1. Type 2 diabetes mellitus with diabetic polyneuropathy, without long-term current use of insulin (H)  Excellent control. Will advise him to cut back his glimepiride to just once daily  - ACCU-CHEK UCHE PLUS test strip; 1 strip by In Vitro route daily  Dispense: 100 strip; Refill: 11  - Hemoglobin A1c  - simvastatin (ZOCOR) 20 MG tablet; Take 1 tablet (20 mg) by mouth At Bedtime  Dispense: 90 tablet; Refill: 1    2. Bariatric surgery status  - Hemoglobin- a bit low. con't iron.   - Ferritin           Art Cheung MD  [facility]

## 2018-12-17 DIAGNOSIS — E11.42 TYPE 2 DIABETES MELLITUS WITH DIABETIC POLYNEUROPATHY, WITHOUT LONG-TERM CURRENT USE OF INSULIN (H): ICD-10-CM

## 2018-12-18 ENCOUNTER — TELEPHONE (OUTPATIENT)
Dept: INTERNAL MEDICINE | Facility: CLINIC | Age: 68
End: 2018-12-18

## 2018-12-18 DIAGNOSIS — N20.0 RECURRENT KIDNEY STONES: ICD-10-CM

## 2018-12-18 DIAGNOSIS — N20.0 URIC ACID KIDNEY STONE: ICD-10-CM

## 2018-12-18 RX ORDER — GLIMEPIRIDE 2 MG/1
TABLET ORAL
Qty: 90 TABLET | Refills: 0 | Status: SHIPPED | OUTPATIENT
Start: 2018-12-18 | End: 2019-04-03

## 2018-12-18 NOTE — TELEPHONE ENCOUNTER
[facility] tele [phone] Pharmacy wants new rx for pot citrate still potassium 25meq to replace the potassium/bicarb

## 2018-12-18 NOTE — TELEPHONE ENCOUNTER
"Requested Prescriptions   Pending Prescriptions Disp Refills     glimepiride (AMARYL) 2 MG tablet [Pharmacy Med Name: GLIMEPIRIDE 2MG TAB 2 TAB]  Last Written Prescription Date:  09/27/2018  Last Fill Quantity: 90,  # refills: 01   Last Office Visit: 9/27/2018   Future Office Visit:      90 tablet 0     Sig: TAKE 1/2 TABLET (1 MG) BY MOUTH 2 TIMES DAILY    Sulfonylurea Agents Passed - 12/17/2018  4:01 PM       Passed - Blood pressure less than 140/90 in past 6 months    BP Readings from Last 3 Encounters:   09/27/18 124/62   07/05/18 127/75   05/21/18 134/84                Passed - Patient has documented LDL within the past 12 mos.    Recent Labs   Lab Test 05/21/18  0753   LDL 85            Passed - Patient has had a Microalbumin in the past 12 mos.    Recent Labs   Lab Test 05/21/18  0807   MICROL 24   UMALCR 19.52*            Passed - Patient has documented A1c within the specified period of time.    If HgbA1C is 8 or greater, it needs to be on file within the past 3 months.  If less than 8, must be on file within the past 6 months.     Recent Labs   Lab Test 09/27/18  0750   A1C 5.7*            Passed - Patient is age 18 or older       Passed - Patient has a recent creatinine (normal) within the past 12 mos.    Recent Labs   Lab Test 05/21/18  0753  10/05/16  1221   CR 1.11   < >  --    CREAT  --   --  1.3*    < > = values in this interval not displayed.            Passed - Recent (6 mo) or future (30 days) visit within the authorizing provider's specialty    Patient had office visit in the last 6 months or has a visit in the next 30 days with authorizing provider or within the authorizing provider's specialty.  See \"Patient Info\" tab in inbasket, or \"Choose Columns\" in Meds & Orders section of the refill encounter.              "

## 2018-12-21 RX ORDER — POTASSIUM CITRATE 10 MEQ/1
10 TABLET, EXTENDED RELEASE ORAL 2 TIMES DAILY
Qty: 60 TABLET | Refills: 11 | Status: SHIPPED | OUTPATIENT
Start: 2018-12-21 | End: 2020-01-22

## 2019-01-16 DIAGNOSIS — E11.42 TYPE 2 DIABETES MELLITUS WITH DIABETIC POLYNEUROPATHY, WITHOUT LONG-TERM CURRENT USE OF INSULIN (H): ICD-10-CM

## 2019-01-16 DIAGNOSIS — I10 ESSENTIAL HYPERTENSION: ICD-10-CM

## 2019-01-16 RX ORDER — LISINOPRIL 20 MG/1
TABLET ORAL
Qty: 90 TABLET | Refills: 1 | Status: SHIPPED | OUTPATIENT
Start: 2019-01-16 | End: 2019-05-17

## 2019-01-16 NOTE — TELEPHONE ENCOUNTER
"Requested Prescriptions   Pending Prescriptions Disp Refills     lisinopril (PRINIVIL/ZESTRIL) 20 MG tablet [Pharmacy Med Name: LISINOPRIL 20 MG TAB 20 TAB] 90 tablet 1     Sig: TAKE 1 TABLET (20 MG) BY MOUTH DAILY    ACE Inhibitors (Including Combos) Protocol Passed - 1/16/2019 12:06 PM       Passed - Blood pressure under 140/90 in past 12 months    BP Readings from Last 3 Encounters:   09/27/18 124/62   07/05/18 127/75   05/21/18 134/84                Passed - Recent (12 mo) or future (30 days) visit within the authorizing provider's specialty    Patient had office visit in the last 12 months or has a visit in the next 30 days with authorizing provider or within the authorizing provider's specialty.  See \"Patient Info\" tab in inbasket, or \"Choose Columns\" in Meds & Orders section of the refill encounter.             Passed - Medication is active on med list       Passed - Patient is age 18 or older       Passed - Normal serum creatinine on file in past 12 months    Recent Labs   Lab Test 05/21/18  0753  10/05/16  1221   CR 1.11   < >  --    CREAT  --   --  1.3*    < > = values in this interval not displayed.            Passed - Normal serum potassium on file in past 12 months    Recent Labs   Lab Test 05/21/18  0753   POTASSIUM 4.2               "

## 2019-04-03 DIAGNOSIS — E11.42 TYPE 2 DIABETES MELLITUS WITH DIABETIC POLYNEUROPATHY, WITHOUT LONG-TERM CURRENT USE OF INSULIN (H): ICD-10-CM

## 2019-04-03 RX ORDER — GLIMEPIRIDE 2 MG/1
TABLET ORAL
Qty: 30 TABLET | Refills: 0 | Status: SHIPPED | OUTPATIENT
Start: 2019-04-03 | End: 2019-05-17

## 2019-04-03 NOTE — LETTER
[facility]  [address] [id number] [phone]            Los Renee  [address] [place] [id number]        April 3, 2019    Dear Los,    While refilling your prescription today, we noticed that you are due to have labs drawn and see your provider.  We will refill your prescription for 30 days, but a follow-up appointment must be made before any additional refills can be approved.     Taking care of your health is important to us and we look forward to seeing you in the near future.  Please call us at [phone] or [phone] (or use Perfect Escapes) to schedule an appointment.     Please disregard this notice if you have already made an appointment.    Sincerely,        [facility]

## 2019-04-03 NOTE — TELEPHONE ENCOUNTER
"Requested Prescriptions   Pending Prescriptions Disp Refills     glimepiride (AMARYL) 2 MG tablet 90 tablet 0    Sulfonylurea Agents Failed - 4/3/2019 12:24 PM       Failed - Blood pressure less than 140/90 in past 6 months    BP Readings from Last 3 Encounters:   09/27/18 124/62   07/05/18 127/75   05/21/18 134/84                Failed - Patient has documented A1c within the specified period of time.    If HgbA1C is 8 or greater, it needs to be on file within the past 3 months.  If less than 8, must be on file within the past 6 months.     Recent Labs   Lab Test 09/27/18  0750   A1C 5.7*            Failed - Recent (6 mo) or future (30 days) visit within the authorizing provider's specialty    Patient had office visit in the last 6 months or has a visit in the next 30 days with authorizing provider or within the authorizing provider's specialty.  See \"Patient Info\" tab in inbasket, or \"Choose Columns\" in Meds & Orders section of the refill encounter.           Passed - Patient has documented LDL within the past 12 mos.    Recent Labs   Lab Test 05/21/18  0753   LDL 85            Passed - Patient has had a Microalbumin in the past 12 mos.    Recent Labs   Lab Test 05/21/18  0807   MICROL 24   UMALCR 19.52*            Passed - Medication is active on med list       Passed - Patient is age 18 or older       Passed - Patient has a recent creatinine (normal) within the past 12 mos.    Recent Labs   Lab Test 05/21/18  0753  10/05/16  1221   CR 1.11   < >  --    CREAT  --   --  1.3*    < > = values in this interval not displayed.             Medication is being filled for 1 time refill only due to:  Patient needs labs and office visit.    "

## 2019-05-14 ENCOUNTER — TELEPHONE (OUTPATIENT)
Dept: INTERNAL MEDICINE | Facility: CLINIC | Age: 69
End: 2019-05-14

## 2019-05-14 DIAGNOSIS — E11.42 TYPE 2 DIABETES MELLITUS WITH DIABETIC POLYNEUROPATHY, WITHOUT LONG-TERM CURRENT USE OF INSULIN (H): Primary | ICD-10-CM

## 2019-05-14 NOTE — TELEPHONE ENCOUNTER
Reason for Call: Request for an order or referral:    Order or referral being requested: labs    Date needed: as soon as possible    Has the patient been seen by the PCP for this problem? NO    Additional comments: Pt has an appt with Dr Cheung 5/17/19.  Pt wants to know if Dr Cheung wants him to have labs before the appt.  Please call pt back.    Phone number Patient can be reached at:  Home number on file [phone] (home)    Best Time:  anytime    Can we leave a detailed message on this number?  YES    Call taken on 5/14/2019 at 7:25 AM by YAS BARBOUR

## 2019-05-14 NOTE — TELEPHONE ENCOUNTER
Left message informing patient to schedule appointment for fasting labs.   Lynette Ford, EDUAR on 5/14/2019 at 5:12 PM

## 2019-05-17 ENCOUNTER — OFFICE VISIT (OUTPATIENT)
Dept: INTERNAL MEDICINE | Facility: CLINIC | Age: 69
End: 2019-05-17
Payer: COMMERCIAL

## 2019-05-17 VITALS
BODY MASS INDEX: 28.95 KG/M2 | OXYGEN SATURATION: 99 % | SYSTOLIC BLOOD PRESSURE: 130 MMHG | WEIGHT: 191 LBS | HEIGHT: 68 IN | DIASTOLIC BLOOD PRESSURE: 74 MMHG | RESPIRATION RATE: 14 BRPM | TEMPERATURE: 98 F

## 2019-05-17 DIAGNOSIS — E11.42 TYPE 2 DIABETES MELLITUS WITH DIABETIC POLYNEUROPATHY, WITHOUT LONG-TERM CURRENT USE OF INSULIN (H): Primary | ICD-10-CM

## 2019-05-17 DIAGNOSIS — I10 ESSENTIAL HYPERTENSION: ICD-10-CM

## 2019-05-17 DIAGNOSIS — Z98.84 BARIATRIC SURGERY STATUS: ICD-10-CM

## 2019-05-17 LAB
ANION GAP SERPL CALCULATED.3IONS-SCNC: 8 MMOL/L (ref 3–14)
BUN SERPL-MCNC: 25 MG/DL (ref 7–30)
CALCIUM SERPL-MCNC: 9.3 MG/DL (ref 8.5–10.1)
CHLORIDE SERPL-SCNC: 106 MMOL/L (ref 94–109)
CHOLEST SERPL-MCNC: 169 MG/DL
CO2 SERPL-SCNC: 27 MMOL/L (ref 20–32)
CREAT SERPL-MCNC: 1.41 MG/DL (ref 0.66–1.25)
CREAT UR-MCNC: 161 MG/DL
GFR SERPL CREATININE-BSD FRML MDRD: 51 ML/MIN/{1.73_M2}
GLUCOSE SERPL-MCNC: 249 MG/DL (ref 70–99)
HBA1C MFR BLD: 6.6 % (ref 0–5.6)
HDLC SERPL-MCNC: 35 MG/DL
HGB BLD-MCNC: 12.8 G/DL (ref 13.3–17.7)
LDLC SERPL CALC-MCNC: 100 MG/DL
MICROALBUMIN UR-MCNC: 25 MG/L
MICROALBUMIN/CREAT UR: 15.59 MG/G CR (ref 0–17)
NONHDLC SERPL-MCNC: 134 MG/DL
POTASSIUM SERPL-SCNC: 4.6 MMOL/L (ref 3.4–5.3)
SODIUM SERPL-SCNC: 141 MMOL/L (ref 133–144)
TRIGL SERPL-MCNC: 170 MG/DL

## 2019-05-17 PROCEDURE — 83036 HEMOGLOBIN GLYCOSYLATED A1C: CPT | Performed by: INTERNAL MEDICINE

## 2019-05-17 PROCEDURE — 99214 OFFICE O/P EST MOD 30 MIN: CPT | Performed by: INTERNAL MEDICINE

## 2019-05-17 PROCEDURE — 80048 BASIC METABOLIC PNL TOTAL CA: CPT | Performed by: INTERNAL MEDICINE

## 2019-05-17 PROCEDURE — 85018 HEMOGLOBIN: CPT | Performed by: INTERNAL MEDICINE

## 2019-05-17 PROCEDURE — 80061 LIPID PANEL: CPT | Performed by: INTERNAL MEDICINE

## 2019-05-17 PROCEDURE — 36415 COLL VENOUS BLD VENIPUNCTURE: CPT | Performed by: INTERNAL MEDICINE

## 2019-05-17 PROCEDURE — 82043 UR ALBUMIN QUANTITATIVE: CPT | Performed by: INTERNAL MEDICINE

## 2019-05-17 RX ORDER — MULTIPLE VITAMINS W/ MINERALS TAB 9MG-400MCG
1 TAB ORAL DAILY
COMMUNITY

## 2019-05-17 RX ORDER — LISINOPRIL 20 MG/1
20 TABLET ORAL DAILY
Qty: 90 TABLET | Refills: 1 | Status: SHIPPED | OUTPATIENT
Start: 2019-05-17 | End: 2020-02-05

## 2019-05-17 RX ORDER — SIMVASTATIN 20 MG
20 TABLET ORAL AT BEDTIME
Qty: 90 TABLET | Refills: 1 | Status: SHIPPED | OUTPATIENT
Start: 2019-05-17 | End: 2020-03-12

## 2019-05-17 RX ORDER — GLIMEPIRIDE 2 MG/1
TABLET ORAL
Qty: 90 TABLET | Refills: 1 | Status: SHIPPED | OUTPATIENT
Start: 2019-05-17 | End: 2020-01-16

## 2019-05-17 ASSESSMENT — MIFFLIN-ST. JEOR: SCORE: 1620.87

## 2019-05-17 NOTE — PROGRESS NOTES
"  SUBJECTIVE:   Los Renee is a 68 year old male who presents to clinic today for the following health issues:    HPI  Diabetes Follow-up      Patient is checking blood sugars: when feeling low sugar symptoms    Diabetic concerns: low blood sugar several less than 70 in the past few weeks     Symptoms of hypoglycemia (low blood sugar): shaky, dizzy, weak     Paresthesias (numbness or burning in feet) or sores: Yes      Date of last diabetic eye exam: > 1 yr ago    Diabetes Management Resources    Hyperlipidemia Follow-Up      Rate your low fat/cholesterol diet?: not monitoring fat    Taking statin?  No-side effect-nausea    Other lipid medications/supplements?:  none    Hypertension Follow-up      Outpatient blood pressures are not being checked.-feeling dizzy when standing quickly or bending over    Low Salt Diet: no added salt    BP Readings from Last 2 Encounters:   09/27/18 124/62   07/05/18 127/75     Hemoglobin A1C (%)   Date Value   09/27/2018 5.7 (H)   05/21/2018 6.8 (H)     LDL Cholesterol Calculated (mg/dL)   Date Value   05/21/2018 85   01/31/2017 84     Additional history: as documented    Reviewed and updated as needed this visit by clinical staff         Reviewed and updated as needed this visit by Provider         Labs reviewed in EPIC    ROS:  Constitutional, HEENT, cardiovascular, pulmonary, gi and gu systems are negative, except as otherwise noted.    OBJECTIVE:                                                    /74   Temp 98  F (36.7  C) (Oral)   Resp 14   Ht 1.727 m (5' 8\")   Wt 86.6 kg (191 lb)   SpO2 99%   BMI 29.04 kg/m    Body mass index is 29.04 kg/m .  GENERAL APPEARANCE: healthy, alert and no distress  HENT: nose and mouth without ulcers or lesions and normal cephalic/atraumatic  NECK: no adenopathy, no asymmetry, masses, or scars and thyroid normal to palpation  RESP: lungs clear to auscultation - no rales, rhonchi or wheezes  CV: regular rates and rhythm, normal S1 S2, no " S3 or S4 and no murmur, click or rub  MS: extremities normal- no gross deformities noted  SKIN: no suspicious lesions or rashes  DIABETIC FOOT EXAM: normal DP and PT pulses, no trophic changes or ulcerative lesions and normal sensory exam    Diagnostic test results:  Results for orders placed or performed in visit on 05/17/19   Hemoglobin A1c   Result Value Ref Range    Hemoglobin A1C 6.6 (H) 0 - 5.6 %   Basic metabolic panel   Result Value Ref Range    Sodium 141 133 - 144 mmol/L    Potassium 4.6 3.4 - 5.3 mmol/L    Chloride 106 94 - 109 mmol/L    Carbon Dioxide 27 20 - 32 mmol/L    Anion Gap 8 3 - 14 mmol/L    Glucose 249 (H) 70 - 99 mg/dL    Urea Nitrogen 25 7 - 30 mg/dL    Creatinine 1.41 (H) 0.66 - 1.25 mg/dL    GFR Estimate 51 (L) >60 mL/min/[1.73_m2]    GFR Estimate If Black 59 (L) >60 mL/min/[1.73_m2]    Calcium 9.3 8.5 - 10.1 mg/dL   Lipid panel reflex to direct LDL Fasting   Result Value Ref Range    Cholesterol 169 <200 mg/dL    Triglycerides 170 (H) <150 mg/dL    HDL Cholesterol 35 (L) >39 mg/dL    LDL Cholesterol Calculated 100 (H) <100 mg/dL    Non HDL Cholesterol 134 (H) <130 mg/dL   Albumin Random Urine Quantitative with Creat Ratio   Result Value Ref Range    Creatinine Urine 161 mg/dL    Albumin Urine mg/L 25 mg/L    Albumin Urine mg/g Cr 15.59 0 - 17 mg/g Cr   Hemoglobin   Result Value Ref Range    Hemoglobin 12.8 (L) 13.3 - 17.7 g/dL         ASSESSMENT/PLAN:                                                    1. Type 2 diabetes mellitus with diabetic polyneuropathy, without long-term current use of insulin (H)  Based on hypoglycemia- cut back dose of glimepiride to 1 mg qday. Overall though his control is worse- may need to look at alternative to sulfonyurea.    Start checking more often- work on diet, f/u labs /visit 3 mo   Not taking statin- restart, if not tolerated, change to alternative    - glimepiride (AMARYL) 2 MG tablet; TAKE 1/2 TABLET DAILY  Dispense: 90 tablet; Refill: 1  -  linagliptin-metFORMIN ER (JENTADUETO XR) 2.5-1000 MG per table; Take 2 tablets by mouth daily with food  Dispense: 180 tablet; Refill: 1  - lisinopril (PRINIVIL/ZESTRIL) 20 MG tablet; Take 1 tablet (20 mg) by mouth daily  Dispense: 90 tablet; Refill: 1  - simvastatin (ZOCOR) 20 MG tablet; Take 1 tablet (20 mg) by mouth At Bedtime  Dispense: 90 tablet; Refill: 1  - Hemoglobin A1c  - Basic metabolic panel  - Lipid panel reflex to direct LDL Fasting  - Albumin Random Urine Quantitative with Creat Ratio    2. Essential hypertension  Stable- cont meds  - lisinopril (PRINIVIL/ZESTRIL) 20 MG tablet; Take 1 tablet (20 mg) by mouth daily  Dispense: 90 tablet; Refill: 1    3. Bariatric surgery status  - Hemoglobin      Follow up with Provider - 6 mo      Art Cheung MD  [facility]

## 2019-09-29 ENCOUNTER — HEALTH MAINTENANCE LETTER (OUTPATIENT)
Age: 69
End: 2019-09-29

## 2019-11-12 DIAGNOSIS — E11.42 TYPE 2 DIABETES MELLITUS WITH DIABETIC POLYNEUROPATHY, WITHOUT LONG-TERM CURRENT USE OF INSULIN (H): ICD-10-CM

## 2019-11-12 RX ORDER — LINAGLIPTIN AND METFORMIN HYDROCHLORIDE 2.5; 1 MG/1; MG/1
TABLET, FILM COATED, EXTENDED RELEASE ORAL
Qty: 180 TABLET | Refills: 0 | Status: SHIPPED | OUTPATIENT
Start: 2019-11-12 | End: 2020-03-09

## 2019-11-12 NOTE — TELEPHONE ENCOUNTER
"Requested Prescriptions   Pending Prescriptions Disp Refills     JENTADUETO XR 2.5-1000 MG per table [Pharmacy Med Name: JENTADUETO XR 2.5 MG-1,000 MG] 180 tablet 0     Sig: TAKE 2 TABLETS BY MOUTH DAILY WITH FOOD       Combination Oral Antihyperglycemic Agents Passed - 11/12/2019  2:34 AM        Passed - Blood pressure under 140/90 in past 12 months     BP Readings from Last 3 Encounters:   05/17/19 130/74   09/27/18 124/62   07/05/18 127/75                 Passed - Patient has a documented LDL level within past 12 mos.     Recent Labs   Lab Test 05/17/19  0819   *             Passed - Patient has a documented Microalbumin level within past 12 mos.     Recent Labs   Lab Test 05/17/19  0820   MICROL 25   UMALCR 15.59             Passed - Patient has documented A1c within the specified period of time.     If HgbA1C is 8 or greater, it needs to be on file within the past 3 months.  If less than 8, must be on file within the past 6 months.     Recent Labs   Lab Test 05/17/19 0819   A1C 6.6*             Passed - Patient's CR is NOT>1.4 OR Patient's EGFR is NOT<45 within past 12 mos.     Recent Labs   Lab Test 05/17/19 0819   GFRESTIMATED 51*   GFRESTBLACK 59*       Recent Labs   Lab Test 05/17/19 0819   CR 1.41*             Passed - Patient does not have a diagnosis of CHF.        Passed - Medication is active on med list        Passed - Patient is 18 years old or older.        Passed - Recent (6 mo) or future (30 days) visit within the authorizing provider's specialty     Patient had office visit in the last 6 months or has a visit in the next 30 days with authorizing provider or within the authorizing provider's specialty.  See \"Patient Info\" tab in inbasket, or \"Choose Columns\" in Meds & Orders section of the refill encounter.              "

## 2019-12-01 DIAGNOSIS — N40.1 BENIGN PROSTATIC HYPERPLASIA WITH LOWER URINARY TRACT SYMPTOMS, SYMPTOM DETAILS UNSPECIFIED: ICD-10-CM

## 2019-12-02 NOTE — TELEPHONE ENCOUNTER
"Requested Prescriptions   Pending Prescriptions Disp Refills     tamsulosin (FLOMAX) 0.4 MG capsule [Pharmacy Med Name: TAMSULOSIN HCL 0.4 MG CAPSULE] 90 capsule 0     Sig: TAKE ONE CAPSULE BY MOUTH EVERY DAY   Last Written Prescription Date:  9/27/2018  Last Fill Quantity: 90,  # refills: 3   Last Office Visit: 5/17/2019   Future Office Visit:         Alpha Blockers Passed - 12/1/2019  1:31 PM        Passed - Blood pressure under 140/90 in past 12 months     BP Readings from Last 3 Encounters:   05/17/19 130/74   09/27/18 124/62   07/05/18 127/75                 Passed - Recent (12 mo) or future (30 days) visit within the authorizing provider's specialty     Patient has had an office visit with the authorizing provider or a provider within the authorizing providers department within the previous 12 mos or has a future within next 30 days. See \"Patient Info\" tab in inbasket, or \"Choose Columns\" in Meds & Orders section of the refill encounter.              Passed - Patient does not have Tadalafil, Vardenafil, or Sildenafil on their medication list        Passed - Medication is active on med list        Passed - Patient is 18 years of age or older          "

## 2019-12-03 RX ORDER — TAMSULOSIN HYDROCHLORIDE 0.4 MG/1
CAPSULE ORAL
Qty: 90 CAPSULE | Refills: 1 | Status: SHIPPED | OUTPATIENT
Start: 2019-12-03 | End: 2020-03-09

## 2020-01-16 DIAGNOSIS — E11.42 TYPE 2 DIABETES MELLITUS WITH DIABETIC POLYNEUROPATHY, WITHOUT LONG-TERM CURRENT USE OF INSULIN (H): ICD-10-CM

## 2020-01-16 RX ORDER — GLIMEPIRIDE 2 MG/1
TABLET ORAL
Qty: 15 TABLET | Refills: 0 | Status: SHIPPED | OUTPATIENT
Start: 2020-01-16 | End: 2020-02-11

## 2020-01-16 NOTE — LETTER
[facility][1]  [address] [phone]            Los Renee  [address] [place] [id number]        January 16, 2020    Dear Los,    While refilling your prescription today, we noticed that you are due for an appointment with your provider.  We will refill your prescription for 30 days, but a follow-up appointment must be made before any additional refills can be approved.     Taking care of your health is important to us and we look forward to seeing you in the near future.  Please call us at [phone] or [phone] (or use [employer]) to schedule an appointment.     Please disregard this notice if you have already made an appointment.    Sincerely,        [facility]

## 2020-01-16 NOTE — TELEPHONE ENCOUNTER
"Requested Prescriptions   Pending Prescriptions Disp Refills     glimepiride (AMARYL) 2 MG tablet [Pharmacy Med Name: GLIMEPIRIDE 2 MG TABLET] 45 tablet 1     Sig: TAKE HALF A TABLET BY MOUTH EVERY DAY       Sulfonylurea Agents Failed - 1/16/2020  4:45 AM        Failed - Blood pressure less than 140/90 in past 6 months     BP Readings from Last 3 Encounters:   05/17/19 130/74   09/27/18 124/62   07/05/18 127/75                 Failed - Patient has documented A1c within the specified period of time.     If HgbA1C is 8 or greater, it needs to be on file within the past 3 months.  If less than 8, must be on file within the past 6 months.     Recent Labs   Lab Test 05/17/19 0819   A1C 6.6*             Failed - Patient has a recent creatinine (normal) within the past 12 mos.     Recent Labs   Lab Test 05/17/19  0819  10/05/16  1221   CR 1.41*   < >  --    CREAT  --   --  1.3*    < > = values in this interval not displayed.             Failed - Recent (6 mo) or future (30 days) visit within the authorizing provider's specialty     Patient had office visit in the last 6 months or has a visit in the next 30 days with authorizing provider or within the authorizing provider's specialty.  See \"Patient Info\" tab in inbasket, or \"Choose Columns\" in Meds & Orders section of the refill encounter.            Passed - Patient has documented LDL within the past 12 mos.     Recent Labs   Lab Test 05/17/19  0819   *             Passed - Patient has had a Microalbumin in the past 15 mos.     Recent Labs   Lab Test 05/17/19  0820   MICROL 25   UMALCR 15.59             Passed - Medication is active on med list        Passed - Patient is age 18 or older        Medication is being filled for 1 time refill only due to:  office visit    "

## 2020-01-22 DIAGNOSIS — N20.0 URIC ACID KIDNEY STONE: ICD-10-CM

## 2020-01-22 DIAGNOSIS — N20.0 RECURRENT KIDNEY STONES: ICD-10-CM

## 2020-01-22 RX ORDER — POTASSIUM CITRATE 10 MEQ/1
TABLET, EXTENDED RELEASE ORAL
Qty: 180 TABLET | Refills: 0 | Status: SHIPPED | OUTPATIENT
Start: 2020-01-22 | End: 2020-06-11

## 2020-01-22 NOTE — TELEPHONE ENCOUNTER
Requested Prescriptions   Pending Prescriptions Disp Refills     potassium citrate (UROCIT-K) 10 MEQ (1080 MG) CR tablet [Pharmacy Med Name: POTASSIUM CITRATE ER 10 MEQ TB] 180 tablet 3     Sig: TAKE 1 TABLET BY MOUTH TWICE A DAY       There is no refill protocol information for this order

## 2020-02-05 DIAGNOSIS — E11.42 TYPE 2 DIABETES MELLITUS WITH DIABETIC POLYNEUROPATHY, WITHOUT LONG-TERM CURRENT USE OF INSULIN (H): ICD-10-CM

## 2020-02-05 DIAGNOSIS — I10 ESSENTIAL HYPERTENSION: ICD-10-CM

## 2020-02-05 RX ORDER — LISINOPRIL 20 MG/1
TABLET ORAL
Qty: 90 TABLET | Refills: 0 | Status: SHIPPED | OUTPATIENT
Start: 2020-02-05 | End: 2020-03-09

## 2020-02-05 NOTE — TELEPHONE ENCOUNTER
"Requested Prescriptions   Pending Prescriptions Disp Refills     lisinopril (PRINIVIL/ZESTRIL) 20 MG tablet [Pharmacy Med Name: LISINOPRIL 20 MG TABLET] 90 tablet 0     Sig: TAKE 1 TABLET BY MOUTH EVERY DAY       ACE Inhibitors (Including Combos) Protocol Failed - 2/5/2020  9:28 AM        Failed - Normal serum creatinine on file in past 12 months     Recent Labs   Lab Test 05/17/19  0819  10/05/16  1221   CR 1.41*   < >  --    CREAT  --   --  1.3*    < > = values in this interval not displayed.             Passed - Blood pressure under 140/90 in past 12 months     BP Readings from Last 3 Encounters:   05/17/19 130/74   09/27/18 124/62   07/05/18 127/75                 Passed - Recent (12 mo) or future (30 days) visit within the authorizing provider's specialty     Patient has had an office visit with the authorizing provider or a provider within the authorizing providers department within the previous 12 mos or has a future within next 30 days. See \"Patient Info\" tab in inbasket, or \"Choose Columns\" in Meds & Orders section of the refill encounter.              Passed - Medication is active on med list        Passed - Patient is age 18 or older        Passed - Normal serum potassium on file in past 12 months     Recent Labs   Lab Test 05/17/19 0819   POTASSIUM 4.6               "

## 2020-02-05 NOTE — LETTER
[facility]  [address] [address]  Phone: [phone]       February 5, 2020         Los Renee  [address] [place] [id number]            Dear Los:    We are concerned about your health care.  We recently provided you with medication refills.  Many medications require routine follow-up with your doctor.    Your prescription(s) have been refilled for 30 DAYS so you may have time for the above noted follow-up. Please call to schedule soon so we can assure you have an appointment before your next refills are needed.    Thank you,      Art Cheung MD  Internal Medicine

## 2020-02-11 DIAGNOSIS — E11.42 TYPE 2 DIABETES MELLITUS WITH DIABETIC POLYNEUROPATHY, WITHOUT LONG-TERM CURRENT USE OF INSULIN (H): ICD-10-CM

## 2020-02-11 RX ORDER — GLIMEPIRIDE 2 MG/1
TABLET ORAL
Qty: 15 TABLET | Refills: 0 | Status: SHIPPED | OUTPATIENT
Start: 2020-02-11 | End: 2020-03-09

## 2020-02-11 NOTE — TELEPHONE ENCOUNTER
"Requested Prescriptions   Pending Prescriptions Disp Refills     glimepiride (AMARYL) 2 MG tablet [Pharmacy Med Name: GLIMEPIRIDE 2 MG TABLET] 15 tablet 0     Sig: TAKE 1/2 TABLET BY MOUTH EVERY DAY       Sulfonylurea Agents Failed - 2/11/2020  1:02 PM        Failed - Blood pressure less than 140/90 in past 6 months     BP Readings from Last 3 Encounters:   05/17/19 130/74   09/27/18 124/62   07/05/18 127/75                 Failed - Patient has documented A1c within the specified period of time.     If HgbA1C is 8 or greater, it needs to be on file within the past 3 months.  If less than 8, must be on file within the past 6 months.     Recent Labs   Lab Test 05/17/19  0819   A1C 6.6*             Failed - Patient has a recent creatinine (normal) within the past 12 mos.     Recent Labs   Lab Test 05/17/19  0819  10/05/16  1221   CR 1.41*   < >  --    CREAT  --   --  1.3*    < > = values in this interval not displayed.             Failed - Recent (6 mo) or future (30 days) visit within the authorizing provider's specialty     Patient had office visit in the last 6 months or has a visit in the next 30 days with authorizing provider or within the authorizing provider's specialty.  See \"Patient Info\" tab in inbasket, or \"Choose Columns\" in Meds & Orders section of the refill encounter.            Passed - Patient has documented LDL within the past 12 mos.     Recent Labs   Lab Test 05/17/19  0819   *             Passed - Patient has had a Microalbumin in the past 15 mos.     Recent Labs   Lab Test 05/17/19  0820   MICROL 25   UMALCR 15.59             Passed - Medication is active on med list        Passed - Patient is age 18 or older        Routing refill request to provider for review/approval because:  Shazia given x1 and patient did not follow up, please advise  Overdue for labs and ov          "

## 2020-03-06 DIAGNOSIS — E11.42 TYPE 2 DIABETES MELLITUS WITH DIABETIC POLYNEUROPATHY, WITHOUT LONG-TERM CURRENT USE OF INSULIN (H): ICD-10-CM

## 2020-03-06 LAB — HBA1C MFR BLD: 7.6 % (ref 0–5.6)

## 2020-03-06 PROCEDURE — 83036 HEMOGLOBIN GLYCOSYLATED A1C: CPT | Performed by: INTERNAL MEDICINE

## 2020-03-06 PROCEDURE — 36415 COLL VENOUS BLD VENIPUNCTURE: CPT | Performed by: INTERNAL MEDICINE

## 2020-03-07 DIAGNOSIS — E11.42 TYPE 2 DIABETES MELLITUS WITH DIABETIC POLYNEUROPATHY, WITHOUT LONG-TERM CURRENT USE OF INSULIN (H): ICD-10-CM

## 2020-03-08 NOTE — TELEPHONE ENCOUNTER
Requested Prescriptions   Pending Prescriptions Disp Refills     JENTADUETO XR 2.5-1000 MG per table [Pharmacy Med Name: JENTADUETO XR 2.5 MG-1,000 MG] 180 tablet 0     Sig: TAKE 2 TABLETS BY MOUTH DAILY WITH FOOD   Last Written Prescription Date:  11/12/2019  Last Fill Quantity: 180,  # refills: 0   Last Office Visit: 5/17/2019   Future Office Visit:    Next 5 appointments (look out 90 days)    Mar 12, 2020  7:20 AM CDT  Office Visit with Art Cheung MD  [facility] ([facility]) [address] [id number] [phone]             Combination Oral Antihyperglycemic Agents Passed - 3/7/2020 11:31 AM        Passed - Blood pressure under 140/90 in past 12 months     BP Readings from Last 3 Encounters:   05/17/19 130/74   09/27/18 124/62   07/05/18 127/75                 Passed - Patient has a documented LDL level within past 12 mos.     Recent Labs   Lab Test 05/17/19  0819   *             Passed - Patient has a documented Microalbumin level within past 12 mos.     Recent Labs   Lab Test 05/17/19  0820   MICROL 25   UMALCR 15.59             Passed - Patient has documented A1c within the specified period of time.     If HgbA1C is 8 or greater, it needs to be on file within the past 3 months.  If less than 8, must be on file within the past 6 months.     Recent Labs   Lab Test 03/06/20  0800   A1C 7.6*             Passed - Patient's CR is NOT>1.4 OR Patient's EGFR is NOT<45 within past 12 mos.     Recent Labs   Lab Test 05/17/19  0819   GFRESTIMATED 51*   GFRESTBLACK 59*       Recent Labs   Lab Test 05/17/19  0819   CR 1.41*             Passed - Patient does not have a diagnosis of CHF.        Passed - Medication is active on med list        Passed - Patient is 18 years old or older.        Passed - Recent (6 mo) or future (30 days) visit within the authorizing provider's specialty     Patient had office visit in the last 6 months or has  "a visit in the next 30 days with authorizing provider or within the authorizing provider's specialty.  See \"Patient Info\" tab in inbasket, or \"Choose Columns\" in Meds & Orders section of the refill encounter.                 "

## 2020-03-09 DIAGNOSIS — N40.1 BENIGN PROSTATIC HYPERPLASIA WITH LOWER URINARY TRACT SYMPTOMS, SYMPTOM DETAILS UNSPECIFIED: ICD-10-CM

## 2020-03-09 DIAGNOSIS — I10 ESSENTIAL HYPERTENSION: ICD-10-CM

## 2020-03-09 DIAGNOSIS — E11.42 TYPE 2 DIABETES MELLITUS WITH DIABETIC POLYNEUROPATHY, WITHOUT LONG-TERM CURRENT USE OF INSULIN (H): ICD-10-CM

## 2020-03-09 RX ORDER — LINAGLIPTIN AND METFORMIN HYDROCHLORIDE 2.5; 1 MG/1; MG/1
TABLET, FILM COATED, EXTENDED RELEASE ORAL
Qty: 180 TABLET | Refills: 0 | Status: SHIPPED | OUTPATIENT
Start: 2020-03-09 | End: 2020-04-02

## 2020-03-09 RX ORDER — GLIMEPIRIDE 2 MG/1
TABLET ORAL
Qty: 15 TABLET | Refills: 0 | Status: SHIPPED | OUTPATIENT
Start: 2020-03-09 | End: 2020-03-12

## 2020-03-09 RX ORDER — LISINOPRIL 20 MG/1
20 TABLET ORAL DAILY
Qty: 30 TABLET | Refills: 0 | Status: SHIPPED | OUTPATIENT
Start: 2020-03-09 | End: 2020-03-12

## 2020-03-09 RX ORDER — BLOOD SUGAR DIAGNOSTIC
1 STRIP MISCELLANEOUS DAILY
Qty: 100 STRIP | Refills: 0 | Status: SHIPPED | OUTPATIENT
Start: 2020-03-09 | End: 2020-03-17

## 2020-03-09 RX ORDER — TAMSULOSIN HYDROCHLORIDE 0.4 MG/1
0.4 CAPSULE ORAL DAILY
Qty: 90 CAPSULE | Refills: 0 | Status: SHIPPED | OUTPATIENT
Start: 2020-03-09 | End: 2020-08-12

## 2020-03-09 NOTE — TELEPHONE ENCOUNTER
"Requested Prescriptions   Pending Prescriptions Disp Refills     tamsulosin (FLOMAX) 0.4 MG capsule  Last Written Prescription Date:  2019  Last Fill Quantity: 90,  # refills: 1   Last Office Visit: 2019   Future Office Visit:    Next 5 appointments (look out 90 days)    Mar 12, 2020  7:20 AM CDT  Office Visit with Art Cheung MD  [facility] ([facility]) [address] [phone]          90 capsule 1     Sig: Take 1 capsule (0.4 mg) by mouth daily       Alpha Blockers Passed - 3/9/2020  2:42 PM        Passed - Blood pressure under 140/90 in past 12 months     BP Readings from Last 3 Encounters:   19 130/74   18 124/62   18 127/75                 Passed - Recent (12 mo) or future (30 days) visit within the authorizing provider's specialty     Patient has had an office visit with the authorizing provider or a provider within the authorizing providers department within the previous 12 mos or has a future within next 30 days. See \"Patient Info\" tab in inbasket, or \"Choose Columns\" in Meds & Orders section of the refill encounter.              Passed - Patient does not have Tadalafil, Vardenafil, or Sildenafil on their medication list        Passed - Medication is active on med list        Passed - Patient is 18 years of age or older           ACCU-CHEK UCHE PLUS test strip  Last Written Prescription Date:  2018  Last Fill Quantity: 100,  # refills: 11   Last Office Visit: 2019   Future Office Visit:    Next 5 appointments (look out 90 days)    Mar 12, 2020  7:20 AM CDT  Office Visit with Art Cheung MD  [facility] ([facility]) [address] [phone]          100 strip 11     Si strip by In Vitro route daily       Diabetic Supplies Protocol Passed - 3/9/2020  2:42 PM        Passed - " "Medication is active on med list        Passed - Patient is 18 years of age or older        Passed - Recent (6 mo) or future (30 days) visit within the authorizing provider's specialty     Patient had office visit in the last 6 months or has a visit in the next 30 days with authorizing provider.  See \"Patient Info\" tab in inbasket, or \"Choose Columns\" in Meds & Orders section of the refill encounter.               lisinopril (ZESTRIL) 20 MG tablet  Last Written Prescription Date:  02/05/2020  Last Fill Quantity: 90,  # refills: 0   Last Office Visit: 5/17/2019   Future Office Visit:    Next 5 appointments (look out 90 days)    Mar 12, 2020  7:20 AM CDT  Office Visit with Art Cheung MD  [facility] ([facility]) [address] [phone]          90 tablet 0     Sig: Take 1 tablet (20 mg) by mouth daily       ACE Inhibitors (Including Combos) Protocol Failed - 3/9/2020  2:42 PM        Failed - Normal serum creatinine on file in past 12 months     Recent Labs   Lab Test 05/17/19  0819  10/05/16  1221   CR 1.41*   < >  --    CREAT  --   --  1.3*    < > = values in this interval not displayed.             Passed - Blood pressure under 140/90 in past 12 months     BP Readings from Last 3 Encounters:   05/17/19 130/74   09/27/18 124/62   07/05/18 127/75                 Passed - Recent (12 mo) or future (30 days) visit within the authorizing provider's specialty     Patient has had an office visit with the authorizing provider or a provider within the authorizing providers department within the previous 12 mos or has a future within next 30 days. See \"Patient Info\" tab in inbasket, or \"Choose Columns\" in Meds & Orders section of the refill encounter.              Passed - Medication is active on med list        Passed - Patient is age 18 or older        Passed - Normal serum potassium on file in past 12 months     Recent Labs   Lab " "Test 05/17/19  0819   POTASSIUM 4.6                glimepiride (AMARYL) 2 MG tablet  Last Written Prescription Date:  02/11/2020  Last Fill Quantity: 15,  # refills: 0   Last Office Visit: 5/17/2019   Future Office Visit:    Next 5 appointments (look out 90 days)    Mar 12, 2020  7:20 AM CDT  Office Visit with Art Cheung MD  [facility] ([facility]) [address] [place] [phone]          15 tablet 0     Sig: TAKE 1/2 TABLET BY MOUTH EVERY DAY       Sulfonylurea Agents Failed - 3/9/2020  2:42 PM        Failed - Blood pressure less than 140/90 in past 6 months     BP Readings from Last 3 Encounters:   05/17/19 130/74   09/27/18 124/62   07/05/18 127/75                 Failed - Patient has a recent creatinine (normal) within the past 12 mos.     Recent Labs   Lab Test 05/17/19  0819  10/05/16  1221   CR 1.41*   < >  --    CREAT  --   --  1.3*    < > = values in this interval not displayed.             Passed - Patient has documented LDL within the past 12 mos.     Recent Labs   Lab Test 05/17/19  0819   *             Passed - Patient has had a Microalbumin in the past 15 mos.     Recent Labs   Lab Test 05/17/19  0820   MICROL 25   UMALCR 15.59             Passed - Patient has documented A1c within the specified period of time.     If HgbA1C is 8 or greater, it needs to be on file within the past 3 months.  If less than 8, must be on file within the past 6 months.     Recent Labs   Lab Test 03/06/20  0800   A1C 7.6*             Passed - Medication is active on med list        Passed - Patient is age 18 or older        Passed - Recent (6 mo) or future (30 days) visit within the authorizing provider's specialty     Patient had office visit in the last 6 months or has a visit in the next 30 days with authorizing provider or within the authorizing provider's specialty.  See \"Patient Info\" tab in inbasket, or \"Choose Columns\" in " Meds & Orders section of the refill encounter.

## 2020-03-10 ENCOUNTER — TELEPHONE (OUTPATIENT)
Dept: INTERNAL MEDICINE | Facility: CLINIC | Age: 70
End: 2020-03-10

## 2020-03-10 DIAGNOSIS — E11.42 TYPE 2 DIABETES MELLITUS WITH DIABETIC POLYNEUROPATHY, WITHOUT LONG-TERM CURRENT USE OF INSULIN (H): Primary | ICD-10-CM

## 2020-03-10 RX ORDER — BLOOD-GLUCOSE METER
EACH MISCELLANEOUS
Qty: 1 KIT | Refills: 0 | Status: SHIPPED | OUTPATIENT
Start: 2020-03-10 | End: 2020-05-06

## 2020-03-10 RX ORDER — ISOPROPYL ALCOHOL 0.75 G/1
1 SWAB TOPICAL DAILY
Qty: 100 EACH | Refills: 0 | Status: ON HOLD | OUTPATIENT
Start: 2020-03-10 | End: 2020-09-29

## 2020-03-10 RX ORDER — BLOOD GLUCOSE CONTROL HIGH,LOW
EACH MISCELLANEOUS
Qty: 1 BOTTLE | Refills: 0 | Status: ON HOLD | OUTPATIENT
Start: 2020-03-10 | End: 2020-09-29

## 2020-03-10 NOTE — TELEPHONE ENCOUNTER
Reason for Call:  Medication or medication refill:    Do you use a [facility]?  Name of the pharmacy and phone number for the current request:    [facility] tele # [phone]    Name of the medication requested: accu-chek víctor plus meter, lancets bd single swab, accu- chek solution    Other request: new rx    Can we leave a detailed message on this number?     Phone number patient can be reached at: Other phone number:  See above tele #    Best Time: soon    Call taken on 3/10/2020 at 9:41 AM by Jumana Waggoner

## 2020-03-12 ENCOUNTER — OFFICE VISIT (OUTPATIENT)
Dept: INTERNAL MEDICINE | Facility: CLINIC | Age: 70
End: 2020-03-12
Payer: COMMERCIAL

## 2020-03-12 VITALS
RESPIRATION RATE: 16 BRPM | OXYGEN SATURATION: 99 % | SYSTOLIC BLOOD PRESSURE: 116 MMHG | DIASTOLIC BLOOD PRESSURE: 60 MMHG | HEART RATE: 82 BPM | WEIGHT: 200.3 LBS | TEMPERATURE: 97.8 F | HEIGHT: 68 IN | BODY MASS INDEX: 30.36 KG/M2

## 2020-03-12 DIAGNOSIS — E11.42 TYPE 2 DIABETES MELLITUS WITH DIABETIC POLYNEUROPATHY, WITHOUT LONG-TERM CURRENT USE OF INSULIN (H): Primary | ICD-10-CM

## 2020-03-12 DIAGNOSIS — I10 ESSENTIAL HYPERTENSION: ICD-10-CM

## 2020-03-12 PROCEDURE — G0008 ADMIN INFLUENZA VIRUS VAC: HCPCS | Performed by: INTERNAL MEDICINE

## 2020-03-12 PROCEDURE — 90662 IIV NO PRSV INCREASED AG IM: CPT | Performed by: INTERNAL MEDICINE

## 2020-03-12 PROCEDURE — 99214 OFFICE O/P EST MOD 30 MIN: CPT | Mod: 25 | Performed by: INTERNAL MEDICINE

## 2020-03-12 RX ORDER — SIMVASTATIN 20 MG
20 TABLET ORAL AT BEDTIME
Qty: 90 TABLET | Refills: 0 | Status: SHIPPED | OUTPATIENT
Start: 2020-03-12 | End: 2020-09-10

## 2020-03-12 RX ORDER — LISINOPRIL 20 MG/1
20 TABLET ORAL DAILY
Qty: 90 TABLET | Refills: 0 | Status: SHIPPED | OUTPATIENT
Start: 2020-03-12 | End: 2020-04-02

## 2020-03-12 RX ORDER — DULAGLUTIDE 0.75 MG/.5ML
0.75 INJECTION, SOLUTION SUBCUTANEOUS
Qty: 2 ML | Refills: 0 | Status: SHIPPED | OUTPATIENT
Start: 2020-03-12 | End: 2020-04-09

## 2020-03-12 RX ORDER — GLIMEPIRIDE 2 MG/1
TABLET ORAL
Qty: 45 TABLET | Refills: 0 | Status: SHIPPED | OUTPATIENT
Start: 2020-03-12 | End: 2020-04-09

## 2020-03-12 ASSESSMENT — MIFFLIN-ST. JEOR: SCORE: 1658.05

## 2020-03-12 NOTE — NURSING NOTE
"Chief Complaint   Patient presents with     Diabetes     /60   Pulse 82   Temp 97.8  F (36.6  C) (Temporal)   Resp 16   Ht 1.727 m (5' 8\")   Wt 90.9 kg (200 lb 4.8 oz)   SpO2 99%   BMI 30.46 kg/m   Estimated body mass index is 30.46 kg/m  as calculated from the following:    Height as of this encounter: 1.727 m (5' 8\").    Weight as of this encounter: 90.9 kg (200 lb 4.8 oz).        Health Maintenance due pending provider review:  NONE    n/a    Ingrid Neff CMA  "

## 2020-03-12 NOTE — PROGRESS NOTES
Subjective     Los Renee is a 69 year old male who presents to clinic today for the following health issues:    HPI   Diabetes Follow-up      How often are you checking your blood sugar? Not at all    What concerns do you have today about your diabetes? None     Do you have any of these symptoms? (Select all that apply)  Numbness in feet tingling in feet    Have you had a diabetic eye exam in the last 12 months? No    BP Readings from Last 2 Encounters:   03/12/20 116/60   05/17/19 130/74     Hemoglobin A1C (%)   Date Value   03/06/2020 7.6 (H)   05/17/2019 6.6 (H)     LDL Cholesterol Calculated (mg/dL)   Date Value   05/17/2019 100 (H)   05/21/2018 85           How many servings of fruits and vegetables do you eat daily?  2-3    On average, how many sweetened beverages do you drink each day (Examples: soda, juice, sweet tea, etc.  Do NOT count diet or artificially sweetened beverages)?   0    How many days per week do you exercise enough to make your heart beat faster? 7    How many minutes a day do you exercise enough to make your heart beat faster? 60 or more    How many days per week do you miss taking your medication? 0    Hypertension Follow-up      Do you check your blood pressure regularly outside of the clinic? Yes     Are you following a low salt diet? No but no added salt    Are your blood pressures ever more than 140 on the top number (systolic) OR more   than 90 on the bottom number (diastolic), for example 140/90? No          Hypertension Follow-up      Do you check your blood pressure regularly outside of the clinic? No     Are you following a low salt diet? No    Are your blood pressures ever more than 140 on the top number (systolic) OR more   than 90 on the bottom number (diastolic), for example 140/90? No    Reviewed and updated as needed this visit by Provider         Review of Systems   ROS COMP: Constitutional, HEENT, cardiovascular, pulmonary, gi and gu systems are negative, except as  "otherwise noted.      Objective    /60   Pulse 82   Temp 97.8  F (36.6  C) (Temporal)   Resp 16   Ht 1.727 m (5' 8\")   Wt 90.9 kg (200 lb 4.8 oz)   SpO2 99%   BMI 30.46 kg/m    Body mass index is 30.46 kg/m .  Physical Exam   GENERAL APPEARANCE: alert and no distress  HENT: nose and mouth without ulcers or lesions and normal cephalic/atraumatic  NECK: no adenopathy, no asymmetry, masses, or scars and thyroid normal to palpation  RESP: lungs clear to auscultation - no rales, rhonchi or wheezes  CV: regular rates and rhythm, normal S1 S2, no S3 or S4 and no murmur, click or rub  MS: extremities normal- no gross deformities noted  SKIN: no suspicious lesions or rashes    none       Assessment & Plan   1. Type 2 diabetes mellitus with diabetic polyneuropathy, without long-term current use of insulin (H)  Start GLP-A  F/u 1 mo for recheck. Increase dose to 1.5 mg at that time  Consider d/c of DPP4i once on stable dose of GLP-A.   - dulaglutide (TRULICITY) 0.75 MG/0.5ML pen; Inject 0.75 mg Subcutaneous every 7 days  Dispense: 2 mL; Refill: 0    2. Essential hypertension  Well controlled. Cont meds       BMI:   Estimated body mass index is 30.46 kg/m  as calculated from the following:    Height as of this encounter: 1.727 m (5' 8\").    Weight as of this encounter: 90.9 kg (200 lb 4.8 oz).   Weight management plan: Discussed healthy diet and exercise guidelines        See Patient Instructions    Return in about 4 weeks (around 4/9/2020) for Diabetes Recheck.    Art Cheung MD  [facility]        "

## 2020-03-15 ENCOUNTER — HEALTH MAINTENANCE LETTER (OUTPATIENT)
Age: 70
End: 2020-03-15

## 2020-03-17 DIAGNOSIS — E11.42 TYPE 2 DIABETES MELLITUS WITH DIABETIC POLYNEUROPATHY, WITHOUT LONG-TERM CURRENT USE OF INSULIN (H): ICD-10-CM

## 2020-03-17 RX ORDER — BLOOD SUGAR DIAGNOSTIC
1 STRIP MISCELLANEOUS DAILY
Qty: 100 STRIP | Refills: 11 | Status: SHIPPED | OUTPATIENT
Start: 2020-03-17 | End: 2020-03-18

## 2020-03-17 NOTE — TELEPHONE ENCOUNTER
"Requested Prescriptions   Pending Prescriptions Disp Refills     ACCU-CHEK UCHE PLUS test strip 100 strip 0     Si strip by In Vitro route daily       Diabetic Supplies Protocol Passed - 3/17/2020  7:49 AM        Passed - Medication is active on med list        Passed - Patient is 18 years of age or older        Passed - Recent (6 mo) or future (30 days) visit within the authorizing provider's specialty     Patient had office visit in the last 6 months or has a visit in the next 30 days with authorizing provider.  See \"Patient Info\" tab in inbasket, or \"Choose Columns\" in Meds & Orders section of the refill encounter.                 Prescription approved per [facility] Refill Protocol.    Rosanna MUÑIZN, RN, PHN      "

## 2020-03-18 ENCOUNTER — TELEPHONE (OUTPATIENT)
Dept: INTERNAL MEDICINE | Facility: CLINIC | Age: 70
End: 2020-03-18

## 2020-03-18 DIAGNOSIS — E11.42 TYPE 2 DIABETES MELLITUS WITH DIABETIC POLYNEUROPATHY, WITHOUT LONG-TERM CURRENT USE OF INSULIN (H): ICD-10-CM

## 2020-03-18 RX ORDER — BLOOD SUGAR DIAGNOSTIC
1 STRIP MISCELLANEOUS DAILY
Qty: 100 STRIP | Refills: 11 | Status: SHIPPED | OUTPATIENT
Start: 2020-03-18

## 2020-03-24 ENCOUNTER — TELEPHONE (OUTPATIENT)
Dept: INTERNAL MEDICINE | Facility: CLINIC | Age: 70
End: 2020-03-24

## 2020-03-24 DIAGNOSIS — I10 ESSENTIAL HYPERTENSION: ICD-10-CM

## 2020-03-24 DIAGNOSIS — E11.42 TYPE 2 DIABETES MELLITUS WITH DIABETIC POLYNEUROPATHY, WITHOUT LONG-TERM CURRENT USE OF INSULIN (H): ICD-10-CM

## 2020-03-24 RX ORDER — GLIMEPIRIDE 2 MG/1
TABLET ORAL
Qty: 45 TABLET | Refills: 0 | Status: CANCELLED | OUTPATIENT
Start: 2020-03-24

## 2020-03-24 RX ORDER — LINAGLIPTIN AND METFORMIN HYDROCHLORIDE 2.5; 1 MG/1; MG/1
TABLET, FILM COATED, EXTENDED RELEASE ORAL
Qty: 180 TABLET | Refills: 0 | Status: CANCELLED | OUTPATIENT
Start: 2020-03-24

## 2020-03-24 RX ORDER — LISINOPRIL 20 MG/1
20 TABLET ORAL DAILY
Qty: 90 TABLET | Refills: 0 | Status: CANCELLED | OUTPATIENT
Start: 2020-03-24

## 2020-03-24 NOTE — TELEPHONE ENCOUNTER
Reason for Call:  Medication or medication refill:    Do you use a [facility]?  Name of the pharmacy and phone number for the current request:  [facility] tele # [phone]    Name of the medication requested: lisinopril, glimepiride, jentadueto    Other request: clarification needed by pharmacy     Can we leave a detailed message on this number?     Phone number patient can be reached at: Other phone number:  See above tele #    Best Time: soon    Call taken on 3/24/2020 at 2:51 PM by Jumana Waggoner

## 2020-03-24 NOTE — TELEPHONE ENCOUNTER
Called [facility] pharmacy- spoke w/ Eugene.    Clarified medication orders.    Eguene voiced understanding.    Richie RN-BSN-PHN  [facility]  [phone]

## 2020-03-24 NOTE — TELEPHONE ENCOUNTER
"Requested Prescriptions   Pending Prescriptions Disp Refills     linagliptin-metFORMIN ER (JENTADUETO XR) 2.5-1000 MG per table 180 tablet 0     Sig: TAKE 2 TABLETS BY MOUTH DAILY WITH FOOD       Combination Oral Antihyperglycemic Agents Passed - 3/24/2020  3:26 PM        Passed - Blood pressure under 140/90 in past 12 months     BP Readings from Last 3 Encounters:   03/12/20 116/60   05/17/19 130/74   09/27/18 124/62                 Passed - Patient has a documented LDL level within past 12 mos.     Recent Labs   Lab Test 05/17/19  0819   *             Passed - Patient has a documented Microalbumin level within past 12 mos.     Recent Labs   Lab Test 05/17/19  0820   MICROL 25   UMALCR 15.59             Passed - Patient has documented A1c within the specified period of time.     If HgbA1C is 8 or greater, it needs to be on file within the past 3 months.  If less than 8, must be on file within the past 6 months.     Recent Labs   Lab Test 03/06/20  0800   A1C 7.6*             Passed - Patient's CR is NOT>1.4 OR Patient's EGFR is NOT<45 within past 12 mos.     Recent Labs   Lab Test 05/17/19  0819   GFRESTIMATED 51*   GFRESTBLACK 59*       Recent Labs   Lab Test 05/17/19  0819   CR 1.41*             Passed - Patient does not have a diagnosis of CHF.        Passed - Medication is active on med list        Passed - Patient is 18 years old or older.        Passed - Recent (6 mo) or future (30 days) visit within the authorizing provider's specialty     Patient had office visit in the last 6 months or has a visit in the next 30 days with authorizing provider or within the authorizing provider's specialty.  See \"Patient Info\" tab in inbasket, or \"Choose Columns\" in Meds & Orders section of the refill encounter.               lisinopril (ZESTRIL) 20 MG tablet 90 tablet 0     Sig: Take 1 tablet (20 mg) by mouth daily       ACE Inhibitors (Including Combos) Protocol Failed - 3/24/2020  3:26 PM        Failed - Normal " "serum creatinine on file in past 12 months     Recent Labs   Lab Test 05/17/19  0819  10/05/16  1221   CR 1.41*   < >  --    CREAT  --   --  1.3*    < > = values in this interval not displayed.       Ok to refill medication if creatinine is low          Passed - Blood pressure under 140/90 in past 12 months     BP Readings from Last 3 Encounters:   03/12/20 116/60   05/17/19 130/74   09/27/18 124/62                 Passed - Recent (12 mo) or future (30 days) visit within the authorizing provider's specialty     Patient has had an office visit with the authorizing provider or a provider within the authorizing providers department within the previous 12 mos or has a future within next 30 days. See \"Patient Info\" tab in inbasket, or \"Choose Columns\" in Meds & Orders section of the refill encounter.              Passed - Medication is active on med list        Passed - Patient is age 18 or older        Passed - Normal serum potassium on file in past 12 months     Recent Labs   Lab Test 05/17/19 0819   POTASSIUM 4.6                glimepiride (AMARYL) 2 MG tablet 45 tablet 0     Sig: TAKE 1/2 TABLET BY MOUTH EVERY DAY       Sulfonylurea Agents Failed - 3/24/2020  3:26 PM        Failed - Patient has a recent creatinine (normal) within the past 12 mos.     Recent Labs   Lab Test 05/17/19  0819  10/05/16  1221   CR 1.41*   < >  --    CREAT  --   --  1.3*    < > = values in this interval not displayed.       Ok to refill medication if creatinine is low          Passed - Blood pressure less than 140/90 in past 6 months     BP Readings from Last 3 Encounters:   03/12/20 116/60   05/17/19 130/74   09/27/18 124/62                 Passed - Patient has documented LDL within the past 12 mos.     Recent Labs   Lab Test 05/17/19  0819   *             Passed - Patient has had a Microalbumin in the past 15 mos.     Recent Labs   Lab Test 05/17/19  0820   MICROL 25   UMALCR 15.59             Passed - Patient has documented A1c " "within the specified period of time.     If HgbA1C is 8 or greater, it needs to be on file within the past 3 months.  If less than 8, must be on file within the past 6 months.     Recent Labs   Lab Test 03/06/20  0800   A1C 7.6*             Passed - Medication is active on med list        Passed - Patient is age 18 or older        Passed - Recent (6 mo) or future (30 days) visit within the authorizing provider's specialty     Patient had office visit in the last 6 months or has a visit in the next 30 days with authorizing provider or within the authorizing provider's specialty.  See \"Patient Info\" tab in inbasket, or \"Choose Columns\" in Meds & Orders section of the refill encounter.               "

## 2020-03-25 NOTE — TELEPHONE ENCOUNTER
Multiple TE's for same issue.  Pt requesting short term fills at [facility]    Ingrid Neff CMA

## 2020-03-25 NOTE — TELEPHONE ENCOUNTER
Reason for Call:  Medication or medication refill:    Do you use a [facility]?  Name of the pharmacy and phone number for the current request:  [facility]  [phone]    Name of the medication requested: please confirm if its [facility] or [facility] that the pt wants his meds sent to; [facility] keeps faxing rx requests and the pt has [employer] Insurance but all his rx's have been [facility] to [facility]? Other than the diabetic supplies; attempted to call pt but no answer  Other request: please clarify where all meds are to go    Can we leave a detailed message on this number?     Phone number patient can be reached at: Cell number on file:  Also see above tele #  Telephone Information:   Mobile [phone]       Best Time: soon    Call taken on 3/25/2020 at 9:38 AM by Jumana Waggoner

## 2020-04-02 DIAGNOSIS — I10 ESSENTIAL HYPERTENSION: ICD-10-CM

## 2020-04-02 DIAGNOSIS — E11.42 TYPE 2 DIABETES MELLITUS WITH DIABETIC POLYNEUROPATHY, WITHOUT LONG-TERM CURRENT USE OF INSULIN (H): ICD-10-CM

## 2020-04-02 RX ORDER — LINAGLIPTIN AND METFORMIN HYDROCHLORIDE 2.5; 1 MG/1; MG/1
TABLET, FILM COATED, EXTENDED RELEASE ORAL
Qty: 180 TABLET | Refills: 0 | Status: SHIPPED | OUTPATIENT
Start: 2020-04-02 | End: 2020-08-12

## 2020-04-02 RX ORDER — LISINOPRIL 20 MG/1
20 TABLET ORAL DAILY
Qty: 90 TABLET | Refills: 1 | Status: SHIPPED | OUTPATIENT
Start: 2020-04-02 | End: 2020-06-26

## 2020-04-02 NOTE — TELEPHONE ENCOUNTER
"Requested Prescriptions   Pending Prescriptions Disp Refills     lisinopril (ZESTRIL) 20 MG tablet 90 tablet 0     Sig: Take 1 tablet (20 mg) by mouth daily       ACE Inhibitors (Including Combos) Protocol Failed - 4/2/2020  8:12 AM        Failed - Normal serum creatinine on file in past 12 months     Recent Labs   Lab Test 05/17/19  0819  10/05/16  1221   CR 1.41*   < >  --    CREAT  --   --  1.3*    < > = values in this interval not displayed.       Ok to refill medication if creatinine is low          Passed - Blood pressure under 140/90 in past 12 months     BP Readings from Last 3 Encounters:   03/12/20 116/60   05/17/19 130/74   09/27/18 124/62                 Passed - Recent (12 mo) or future (30 days) visit within the authorizing provider's specialty     Patient has had an office visit with the authorizing provider or a provider within the authorizing providers department within the previous 12 mos or has a future within next 30 days. See \"Patient Info\" tab in inbasket, or \"Choose Columns\" in Meds & Orders section of the refill encounter.              Passed - Medication is active on med list        Passed - Patient is age 18 or older        Passed - Normal serum potassium on file in past 12 months     Recent Labs   Lab Test 05/17/19 0819   POTASSIUM 4.6                linagliptin-metFORMIN ER (JENTADUETO XR) 2.5-1000 MG per table 180 tablet 0     Sig: TAKE 2 TABLETS BY MOUTH DAILY WITH FOOD       Combination Oral Antihyperglycemic Agents Passed - 4/2/2020  8:12 AM        Passed - Patient has documented A1c within the specified period of time.     If HgbA1C is 8 or greater, it needs to be on file within the past 3 months.  If less than 8, must be on file within the past 6 months.     Recent Labs   Lab Test 03/06/20  0800   A1C 7.6*             Passed - Patient's CR is NOT>1.4 OR Patient's EGFR is NOT<45 within past 12 mos.     Recent Labs   Lab Test 05/17/19 0819   GFRESTIMATED 51*   GFRESTBLACK 59* " "      Recent Labs   Lab Test 05/17/19  0819   CR 1.41*             Passed - Patient does not have a diagnosis of CHF.        Passed - Medication is active on med list        Passed - Patient is 18 years old or older.        Passed - Recent (6 mo) or future (30 days) visit within the authorizing provider's specialty     Patient had office visit in the last 6 months or has a visit in the next 30 days with authorizing provider or within the authorizing provider's specialty.  See \"Patient Info\" tab in inbasket, or \"Choose Columns\" in Meds & Orders section of the refill encounter.               "

## 2020-04-09 ENCOUNTER — VIRTUAL VISIT (OUTPATIENT)
Dept: INTERNAL MEDICINE | Facility: CLINIC | Age: 70
End: 2020-04-09
Payer: COMMERCIAL

## 2020-04-09 VITALS — HEIGHT: 68 IN | BODY MASS INDEX: 30.31 KG/M2 | WEIGHT: 200 LBS

## 2020-04-09 DIAGNOSIS — E11.42 TYPE 2 DIABETES MELLITUS WITH DIABETIC POLYNEUROPATHY, WITHOUT LONG-TERM CURRENT USE OF INSULIN (H): ICD-10-CM

## 2020-04-09 PROCEDURE — 99214 OFFICE O/P EST MOD 30 MIN: CPT | Mod: TEL | Performed by: INTERNAL MEDICINE

## 2020-04-09 RX ORDER — GLIMEPIRIDE 1 MG/1
TABLET ORAL
Qty: 45 TABLET | Refills: 1 | Status: SHIPPED | OUTPATIENT
Start: 2020-04-09 | End: 2020-09-10

## 2020-04-09 ASSESSMENT — MIFFLIN-ST. JEOR: SCORE: 1656.69

## 2020-04-09 NOTE — PROGRESS NOTES
"Subjective     Los Renee is a 69 year old male who is being evaluated via a billable telephone visit.      The patient has been notified of following:     \"This telephone visit will be conducted via a call between you and your physician/provider. We have found that certain health care needs can be provided without the need for a physical exam.  This service lets us provide the care you need with a short phone conversation.  If a prescription is necessary we can send it directly to your pharmacy.  If lab work is needed we can place an order for that and you can then stop by our lab to have the test done at a later time.    Telephone visits are billed at different rates depending on your insurance coverage. During this emergency period, for some insurers they may be billed the same as an in-person visit.  Please reach out to your insurance provider with any questions.    If during the course of the call the physician/provider feels a telephone visit is not appropriate, you will not be charged for this service.\"    Patient has given verbal consent for Telephone visit?  Yes    How would you like to obtain your AVS? Mail a copy    Los Renee complains of   Chief Complaint   Patient presents with     Diabetes       ALLERGIES  Simvastatin    Diabetes Follow-up   - stopped trulicity after 1 dose. More symptomatic lows. Lat a1c 7.6%- but has changed diet quite a bit in last few months.  - reports even after stopping GLP-A that lows have continued. Not unusual to have daytime afternoon # in the 60-70s range  How often are you checking your blood sugar? A few times a week  What time of day are you checking your blood sugars (select all that apply)?  when he feels low  Have you had any blood sugars above 200?  No  Have you had any blood sugars below 70?  Yes down in the 60s    What symptoms do you notice when your blood sugar is low?  Weak and Lethargy    What concerns do you have today about your diabetes? None and " "Low blood sugar     Do you have any of these symptoms? (Select all that apply)  No numbness or tingling in feet.  No redness, sores or blisters on feet.  No complaints of excessive thirst.  No reports of blurry vision.  No significant changes to weight.    Have you had a diabetic eye exam in the last 12 months? No        BP Readings from Last 2 Encounters:   03/12/20 116/60   05/17/19 130/74     Hemoglobin A1C (%)   Date Value   03/06/2020 7.6 (H)   05/17/2019 6.6 (H)     LDL Cholesterol Calculated (mg/dL)   Date Value   05/17/2019 100 (H)   05/21/2018 85                 How many servings of fruits and vegetables do you eat daily?  2-3    On average, how many sweetened beverages do you drink each day (Examples: soda, juice, sweet tea, etc.  Do NOT count diet or artificially sweetened beverages)?   2    How many days per week do you exercise enough to make your heart beat faster? 4    How many minutes a day do you exercise enough to make your heart beat faster? 20 - 29    How many days per week do you miss taking your medication? 0                Reviewed and updated as needed this visit by Provider         Review of Systems   ROS COMP: Constitutional, HEENT, cardiovascular, pulmonary, gi and gu systems are negative, except as otherwise noted.       Objective   Reported vitals:  Ht 1.727 m (5' 8\")   Wt 90.7 kg (200 lb)   BMI 30.41 kg/m     alert and no distress  Psych: Alert and oriented times 3; coherent speech, normal   rate and volume, able to articulate logical thoughts, able   to abstract reason, no tangential thoughts, no hallucinations   or delusions  His affect is normal     Labs reviewed in Epic        Assessment/Plan:  1. Type 2 diabetes mellitus with diabetic polyneuropathy, without long-term current use of insulin (H)  Will cut his glimepride dose to 0.5 mg- this would be most likely culprit of hypoglycemia  Review # /labs 3 mo .  Using GLP-A not out but if we do this again would just d/c sulfonyurea " and possibly DPP4i when doing so  - glimepiride (AMARYL) 1 MG tablet; TAKE 1/2 TABLET BY MOUTH EVERY DAY  Dispense: 45 tablet; Refill: 1    Return in about 3 months (around 7/9/2020) for Diabetes recheck with labs before.      Phone call duration:  16 minutes    Art Cheung MD

## 2020-05-05 DIAGNOSIS — E11.42 TYPE 2 DIABETES MELLITUS WITH DIABETIC POLYNEUROPATHY, WITHOUT LONG-TERM CURRENT USE OF INSULIN (H): ICD-10-CM

## 2020-05-06 RX ORDER — BLOOD-GLUCOSE METER
EACH MISCELLANEOUS
Qty: 1 KIT | Refills: 0 | Status: SHIPPED | OUTPATIENT
Start: 2020-05-06

## 2020-06-11 DIAGNOSIS — N20.0 RECURRENT KIDNEY STONES: ICD-10-CM

## 2020-06-11 DIAGNOSIS — N20.0 URIC ACID KIDNEY STONE: ICD-10-CM

## 2020-06-12 RX ORDER — POTASSIUM CITRATE 10 MEQ/1
TABLET, EXTENDED RELEASE ORAL
Qty: 180 TABLET | Refills: 3 | Status: SHIPPED | OUTPATIENT
Start: 2020-06-12

## 2020-06-26 DIAGNOSIS — E11.42 TYPE 2 DIABETES MELLITUS WITH DIABETIC POLYNEUROPATHY, WITHOUT LONG-TERM CURRENT USE OF INSULIN (H): ICD-10-CM

## 2020-06-26 DIAGNOSIS — I10 ESSENTIAL HYPERTENSION: ICD-10-CM

## 2020-06-26 RX ORDER — GLIMEPIRIDE 2 MG/1
TABLET ORAL
Qty: 45 TABLET | Refills: 0 | Status: SHIPPED | OUTPATIENT
Start: 2020-06-26 | End: 2020-09-10

## 2020-06-26 RX ORDER — LISINOPRIL 20 MG/1
TABLET ORAL
Qty: 90 TABLET | Refills: 0 | Status: SHIPPED | OUTPATIENT
Start: 2020-06-26 | End: 2020-09-10

## 2020-06-26 NOTE — TELEPHONE ENCOUNTER
Routing refill request to provider for review/approval because:  Labs not current:  RADHA Bowie

## 2020-08-06 ENCOUNTER — TELEPHONE (OUTPATIENT)
Dept: INTERNAL MEDICINE | Facility: CLINIC | Age: 70
End: 2020-08-06

## 2020-08-06 DIAGNOSIS — E11.42 TYPE 2 DIABETES MELLITUS WITH DIABETIC POLYNEUROPATHY, WITHOUT LONG-TERM CURRENT USE OF INSULIN (H): ICD-10-CM

## 2020-08-06 DIAGNOSIS — N40.1 BENIGN PROSTATIC HYPERPLASIA WITH LOWER URINARY TRACT SYMPTOMS, SYMPTOM DETAILS UNSPECIFIED: ICD-10-CM

## 2020-08-12 RX ORDER — LINAGLIPTIN AND METFORMIN HYDROCHLORIDE 2.5; 1 MG/1; MG/1
TABLET, FILM COATED, EXTENDED RELEASE ORAL
Qty: 180 TABLET | Refills: 0 | Status: SHIPPED | OUTPATIENT
Start: 2020-08-12 | End: 2020-09-10

## 2020-08-12 RX ORDER — TAMSULOSIN HYDROCHLORIDE 0.4 MG/1
0.4 CAPSULE ORAL DAILY
Qty: 90 CAPSULE | Refills: 0 | Status: SHIPPED | OUTPATIENT
Start: 2020-08-12 | End: 2020-09-10

## 2020-08-17 DIAGNOSIS — E11.42 TYPE 2 DIABETES MELLITUS WITH DIABETIC POLYNEUROPATHY, WITHOUT LONG-TERM CURRENT USE OF INSULIN (H): ICD-10-CM

## 2020-08-17 LAB
ANION GAP SERPL CALCULATED.3IONS-SCNC: 6 MMOL/L (ref 3–14)
BUN SERPL-MCNC: 22 MG/DL (ref 7–30)
CALCIUM SERPL-MCNC: 8.7 MG/DL (ref 8.5–10.1)
CHLORIDE SERPL-SCNC: 100 MMOL/L (ref 94–109)
CHOLEST SERPL-MCNC: 145 MG/DL
CO2 SERPL-SCNC: 26 MMOL/L (ref 20–32)
CREAT SERPL-MCNC: 2.21 MG/DL (ref 0.66–1.25)
CREAT UR-MCNC: 192 MG/DL
GFR SERPL CREATININE-BSD FRML MDRD: 30 ML/MIN/{1.73_M2}
GLUCOSE SERPL-MCNC: 249 MG/DL (ref 70–99)
HBA1C MFR BLD: 9.1 % (ref 0–5.6)
HDLC SERPL-MCNC: 32 MG/DL
LDLC SERPL CALC-MCNC: 71 MG/DL
MICROALBUMIN UR-MCNC: 59 MG/L
MICROALBUMIN/CREAT UR: 30.73 MG/G CR (ref 0–17)
NONHDLC SERPL-MCNC: 113 MG/DL
POTASSIUM SERPL-SCNC: 4.3 MMOL/L (ref 3.4–5.3)
SODIUM SERPL-SCNC: 132 MMOL/L (ref 133–144)
TRIGL SERPL-MCNC: 208 MG/DL

## 2020-08-17 PROCEDURE — 80061 LIPID PANEL: CPT | Performed by: INTERNAL MEDICINE

## 2020-08-17 PROCEDURE — 82043 UR ALBUMIN QUANTITATIVE: CPT | Performed by: INTERNAL MEDICINE

## 2020-08-17 PROCEDURE — 80048 BASIC METABOLIC PNL TOTAL CA: CPT | Performed by: INTERNAL MEDICINE

## 2020-08-17 PROCEDURE — 83036 HEMOGLOBIN GLYCOSYLATED A1C: CPT | Performed by: INTERNAL MEDICINE

## 2020-08-17 PROCEDURE — 36415 COLL VENOUS BLD VENIPUNCTURE: CPT | Performed by: INTERNAL MEDICINE

## 2020-09-02 DIAGNOSIS — N17.9 ACUTE RENAL FAILURE, UNSPECIFIED ACUTE RENAL FAILURE TYPE (H): Primary | ICD-10-CM

## 2020-09-03 ENCOUNTER — TELEPHONE (OUTPATIENT)
Dept: INTERNAL MEDICINE | Facility: CLINIC | Age: 70
End: 2020-09-03

## 2020-09-03 NOTE — TELEPHONE ENCOUNTER
"Pt notified of results and reason for getting US done.  Went on to review Dr. Zaragoza lab result note, and to schedule appt next week, and also stop the linagliptin-metFORMIN ER (JENTADUETO XR) 2.5-1000 MG per table , due to abnormal kidney function.   And pt stated \"alright I am done now.\" and hung up phone.  "

## 2020-09-03 NOTE — TELEPHONE ENCOUNTER
Reason for Call:  Other US recommended why?    Detailed comments: the patient doesn't know why he needs to have an US, he says he is not pregnant.  He wants to talk with a nurse to understand further why    Phone Number Patient can be reached at: Cell number on file:    Telephone Information:   Mobile [phone]       Best Time: anytime    Can we leave a detailed message on this number? YES    Call taken on 9/3/2020 at 7:38 AM by Maria R Terry

## 2020-09-09 ENCOUNTER — HOSPITAL ENCOUNTER (OUTPATIENT)
Dept: ULTRASOUND IMAGING | Facility: CLINIC | Age: 70
Discharge: HOME | End: 2020-09-09
Attending: INTERNAL MEDICINE | Admitting: INTERNAL MEDICINE
Payer: COMMERCIAL

## 2020-09-09 DIAGNOSIS — N17.9 ACUTE RENAL FAILURE, UNSPECIFIED ACUTE RENAL FAILURE TYPE (H): ICD-10-CM

## 2020-09-09 PROCEDURE — 76770 US EXAM ABDO BACK WALL COMP: CPT

## 2020-09-10 ENCOUNTER — OFFICE VISIT (OUTPATIENT)
Dept: INTERNAL MEDICINE | Facility: CLINIC | Age: 70
End: 2020-09-10
Payer: COMMERCIAL

## 2020-09-10 VITALS
DIASTOLIC BLOOD PRESSURE: 72 MMHG | WEIGHT: 190.7 LBS | HEIGHT: 68 IN | TEMPERATURE: 97.8 F | HEART RATE: 72 BPM | SYSTOLIC BLOOD PRESSURE: 136 MMHG | BODY MASS INDEX: 28.9 KG/M2

## 2020-09-10 DIAGNOSIS — E11.42 TYPE 2 DIABETES MELLITUS WITH DIABETIC POLYNEUROPATHY, WITHOUT LONG-TERM CURRENT USE OF INSULIN (H): ICD-10-CM

## 2020-09-10 DIAGNOSIS — N40.1 BENIGN PROSTATIC HYPERPLASIA WITH LOWER URINARY TRACT SYMPTOMS, SYMPTOM DETAILS UNSPECIFIED: ICD-10-CM

## 2020-09-10 DIAGNOSIS — N20.0 NEPHROLITHIASIS: ICD-10-CM

## 2020-09-10 DIAGNOSIS — N13.30 HYDRONEPHROSIS OF LEFT KIDNEY: Primary | ICD-10-CM

## 2020-09-10 DIAGNOSIS — I10 ESSENTIAL HYPERTENSION: ICD-10-CM

## 2020-09-10 DIAGNOSIS — N17.9 ACUTE RENAL FAILURE, UNSPECIFIED ACUTE RENAL FAILURE TYPE (H): ICD-10-CM

## 2020-09-10 LAB
ANION GAP SERPL CALCULATED.3IONS-SCNC: 7 MMOL/L (ref 3–14)
BUN SERPL-MCNC: 36 MG/DL (ref 7–30)
CALCIUM SERPL-MCNC: 9.1 MG/DL (ref 8.5–10.1)
CHLORIDE SERPL-SCNC: 105 MMOL/L (ref 94–109)
CO2 SERPL-SCNC: 24 MMOL/L (ref 20–32)
CREAT SERPL-MCNC: 1.82 MG/DL (ref 0.66–1.25)
GFR SERPL CREATININE-BSD FRML MDRD: 37 ML/MIN/{1.73_M2}
GLUCOSE SERPL-MCNC: 151 MG/DL (ref 70–99)
POTASSIUM SERPL-SCNC: 4.7 MMOL/L (ref 3.4–5.3)
SODIUM SERPL-SCNC: 136 MMOL/L (ref 133–144)

## 2020-09-10 PROCEDURE — 80048 BASIC METABOLIC PNL TOTAL CA: CPT | Performed by: INTERNAL MEDICINE

## 2020-09-10 PROCEDURE — 36415 COLL VENOUS BLD VENIPUNCTURE: CPT | Performed by: INTERNAL MEDICINE

## 2020-09-10 PROCEDURE — 99214 OFFICE O/P EST MOD 30 MIN: CPT | Performed by: INTERNAL MEDICINE

## 2020-09-10 RX ORDER — GLIMEPIRIDE 1 MG/1
1 TABLET ORAL
Qty: 90 TABLET | Refills: 0 | Status: SHIPPED | OUTPATIENT
Start: 2020-09-10 | End: 2020-09-28

## 2020-09-10 RX ORDER — SIMVASTATIN 20 MG
20 TABLET ORAL AT BEDTIME
Qty: 90 TABLET | Refills: 3 | Status: SHIPPED | OUTPATIENT
Start: 2020-09-10

## 2020-09-10 RX ORDER — TAMSULOSIN HYDROCHLORIDE 0.4 MG/1
0.8 CAPSULE ORAL DAILY
Qty: 90 CAPSULE | Refills: 0
Start: 2020-09-10 | End: 2020-09-28

## 2020-09-10 RX ORDER — LISINOPRIL 20 MG/1
20 TABLET ORAL DAILY
Qty: 90 TABLET | Refills: 0 | Status: ON HOLD | OUTPATIENT
Start: 2020-09-10 | End: 2020-10-22

## 2020-09-10 ASSESSMENT — MIFFLIN-ST. JEOR: SCORE: 1609.51

## 2020-09-10 NOTE — PROGRESS NOTES
"Subjective     Los Renee is a 70 year old male who presents to clinic today for the following health issues:    HPI     Patient is here to follow-up on recent renal ultrasound due to rise in his creatinine to over 2.0 consistent with acute renal failure.  Has a history of nephrolithiasis and this ultrasound did reveal left renal hydronephrosis with probable ureteral stone as well as large nonobstructing stone in the pelvis of the kidney itself.  He has been asymptomatic from this and notes no pain.  He also states otherwise his urinary habits are unchanged.    With the recent acute renal failure we had him hold his metformin and he has noted a rise in his glucose levels since that time.  Is also been some confusion on the dose of the glimepiride and states he is currently only taking 0.5 mg daily.  His glucoses have been up to 180-200 range on many occasions.          Review of Systems   Constitutional, HEENT, cardiovascular, pulmonary, gi and gu systems are negative, except as otherwise noted.      Objective    /72   Pulse 72   Temp 97.8  F (36.6  C) (Temporal)   Ht 1.727 m (5' 8\")   Wt 86.5 kg (190 lb 11.2 oz)   BMI 29.00 kg/m    Body mass index is 29 kg/m .  Physical Exam   GENERAL APPEARANCE: alert and no distress  HENT: nose and mouth without ulcers or lesions and normal cephalic/atraumatic  RESP: lungs clear to auscultation - no rales, rhonchi or wheezes  CV: regular rates and rhythm, normal S1 S2, no S3 or S4 and no murmur, click or rub  MS: extremities normal- no gross deformities noted  SKIN: no suspicious lesions or rashes    Results for orders placed or performed in visit on 09/10/20 (from the past 24 hour(s))   Basic metabolic panel   Result Value Ref Range    Sodium 136 133 - 144 mmol/L    Potassium 4.7 3.4 - 5.3 mmol/L    Chloride 105 94 - 109 mmol/L    Carbon Dioxide 24 20 - 32 mmol/L    Anion Gap 7 3 - 14 mmol/L    Glucose 151 (H) 70 - 99 mg/dL    Urea Nitrogen 36 (H) 7 - 30 mg/dL    " "Creatinine 1.82 (H) 0.66 - 1.25 mg/dL    GFR Estimate 37 (L) >60 mL/min/[1.73_m2]    GFR Estimate If Black 43 (L) >60 mL/min/[1.73_m2]    Calcium 9.1 8.5 - 10.1 mg/dL           Assessment & Plan     Hydronephrosis of left kidney  Nephrolithiasis  Get CT I have him see urology for the time being see if increasing his tamsulosin 0.8 mg will help any chance that he might pass a stone.  Renal function has improved a bit since last month I anticipate this should improve further once the obstructive uropathy resolves but he likely does have some underlying CKD due to his diabetes and hypertension.  - UROLOGY ADULT REFERRAL; Future  - CT Abdomen Pelvis w/o Contrast; Future  - Basic metabolic panel      Acute renal failure, unspecified acute renal failure type (H)  Some improvement is noted above.  Push fluids and follow-up with urology  - Basic metabolic panel    Type 2 diabetes mellitus with diabetic polyneuropathy, without long-term current use of insulin (H)  Increase glimepiride to 1 mg daily.  For time being continue to hold the metformin though post procedurally likely we will be able to reinstitute once creatinine improves  - glimepiride (AMARYL) 1 MG tablet; Take 1 tablet (1 mg) by mouth every morning (before breakfast)     BMI:   Estimated body mass index is 29 kg/m  as calculated from the following:    Height as of this encounter: 1.727 m (5' 8\").    Weight as of this encounter: 86.5 kg (190 lb 11.2 oz).           See Patient Instructions    No follow-ups on file.    Art Cheung MD  [facility]    "

## 2020-09-19 ENCOUNTER — HOSPITAL ENCOUNTER (OUTPATIENT)
Dept: CT IMAGING | Facility: CLINIC | Age: 70
Discharge: HOME | End: 2020-09-19
Attending: INTERNAL MEDICINE | Admitting: INTERNAL MEDICINE
Payer: COMMERCIAL

## 2020-09-19 DIAGNOSIS — N20.0 NEPHROLITHIASIS: ICD-10-CM

## 2020-09-19 DIAGNOSIS — N13.30 HYDRONEPHROSIS OF LEFT KIDNEY: ICD-10-CM

## 2020-09-19 PROCEDURE — 74176 CT ABD & PELVIS W/O CONTRAST: CPT

## 2020-09-23 DIAGNOSIS — N20.0 KIDNEY STONE: Primary | ICD-10-CM

## 2020-09-25 ENCOUNTER — OFFICE VISIT (OUTPATIENT)
Dept: UROLOGY | Facility: CLINIC | Age: 70
End: 2020-09-25
Payer: COMMERCIAL

## 2020-09-25 VITALS
SYSTOLIC BLOOD PRESSURE: 110 MMHG | HEIGHT: 68 IN | HEART RATE: 60 BPM | WEIGHT: 186 LBS | BODY MASS INDEX: 28.19 KG/M2 | DIASTOLIC BLOOD PRESSURE: 60 MMHG

## 2020-09-25 DIAGNOSIS — N13.30 HYDRONEPHROSIS OF LEFT KIDNEY: Primary | ICD-10-CM

## 2020-09-25 DIAGNOSIS — N20.0 RIGHT KIDNEY STONE: ICD-10-CM

## 2020-09-25 DIAGNOSIS — N20.1 LEFT URETERAL STONE: ICD-10-CM

## 2020-09-25 DIAGNOSIS — N20.0 STAGHORN CALCULUS: ICD-10-CM

## 2020-09-25 PROCEDURE — 99204 OFFICE O/P NEW MOD 45 MIN: CPT | Performed by: UROLOGY

## 2020-09-25 ASSESSMENT — MIFFLIN-ST. JEOR: SCORE: 1588.19

## 2020-09-25 ASSESSMENT — PAIN SCALES - GENERAL: PAINLEVEL: NO PAIN (0)

## 2020-09-25 NOTE — Clinical Note
Lenin Cheung,    I saw Los today in consultation for his LEFT UPJ stone and bilateral kidney stones. Given his left obstruction and stone burden we discussed options and will proceed with a LEFT PCNL.    Thanks,   Asher Le M.D.  Cell: [phone]

## 2020-09-25 NOTE — LETTER
9/25/2020       RE: Los Renee  [address] [address]     Dear Colleague,    Thank you for referring your patient, Los Renee, to the [facility] [place] at [facility]. Please see a copy of my visit note below.    Urology Consult History and Physical  [facility]  Name: Los Renee    MRN: [id number]   YOB: 1950       We were asked to see Los Renee at the request of Dr. Cheung for evaluation and treatment of Bilateral nephrolithiasis.        Chief Complaint:   Bilateral nephrolithiasis    History is obtained from the patient            History of Present Illness:   Los Renee is a 70 year old male who is being seen for evaluation of Bilateral nephrolithiasis.     History of bilateral nephrolithiasis  - ESWL with Dr. Ricketts 6/1/2015 for 1.1cm RIGHT lower pole stone  - Ureteroscopy with Dr. Freitas for RIGHT side 11/3/2016    He did not follow up after that time  He was noted to have rise of his SCr to 2.0 from baseline of 1.1-1.4. A renal U/S was obtained which demonstrated LEFT hydronephrosis with probably ureteral stone.  A CT scan was obtained which demonstrated a LEFT 1.6cm LEFT UPJ stone with additional large bilateral kidney stones  He was referred for further management.   He denies flank pain or symptoms related to the stones    His other past medical history as noted below             Past Medical History:     Past Medical History:   Diagnosis Date     Arthritis      Gastric bypass status for obesity      Gastro-oesophageal reflux disease      GI bleed 11/1/2012     Intermittent asthma     related to allergies- rare symptoms     Mixed hyperlipidemia 1/7/2008     Morbid obesity (H) 01/24/2008    s/p gastric bypass surgery     Morbid obesity (H) 1/24/2008     Mumps     in the early 60's     Nephrolithiasis      SOLITARIO (obstructive sleep apnea) 2010          Other chronic pain      chronic left knee pain     Spider veins      Swelling of testicle      Type 2 diabetes mellitus (H) 01/07/2008     Unspecified essential hypertension 1/7/2008    resolved since gastric surgery            Past Surgical History:     Past Surgical History:   Procedure Laterality Date     COLONOSCOPY N/A 7/5/2018    Procedure: COMBINED COLONOSCOPY, SINGLE OR MULTIPLE BIOPSY/POLYPECTOMY BY BIOPSY;  colonoscopy;  Surgeon: Max Santizo MD;  Location:  GI     CYSTOSCOPY       EXTRACORPOREAL SHOCK WAVE LITHOTRIPSY, CYSTOSCOPY, INSERT STENT URETER(S), COMBINED Right 6/1/2015    Procedure: COMBINED EXTRACORPOREAL SHOCK WAVE LITHOTRIPSY, CYSTOSCOPY, INSERT STENT URETER(S);  Surgeon: Jeovanny Ricketts MD;  Location:  OR     KNEE SURGERY       LAPAROSCOPIC BYPASS GASTRIC       LASER HOLMIUM LITHOTRIPSY URETER(S), INSERT STENT, COMBINED Right 11/3/2016    Procedure: COMBINED CYSTOSCOPY, URETEROSCOPY, LASER HOLMIUM LITHOTRIPSY URETER(S), INSERT STENT;  Surgeon: Neli Freitas MD;  Location:  OR     ORTHOPEDIC SURGERY      bilat knee replaCEMENTS     VASECTOMY              Social History:     Social History     Tobacco Use     Smoking status: Never Smoker     Smokeless tobacco: Never Used   Substance Use Topics     Alcohol use: No     Alcohol/week: 0.0 standard drinks       History   Smoking Status     Never Smoker   Smokeless Tobacco     Never Used            Family History:     Family History   Problem Relation Age of Onset     C.A.D. Mother      Diabetes Mother      Breast Cancer Mother      Cerebrovascular Disease Mother      Alcohol/Drug Mother      Eye Disorder Mother      Heart Disease Mother      C.A.D. Father      Diabetes Father      Alcohol/Drug Father      Leukemia Father               Allergies:     Allergies   Allergen Reactions     Simvastatin Nausea            Medications:     Current Outpatient Medications   Medication Sig     blood glucose (ACCU-CHEK UCHE PLUS) test strip 1 strip by  In Vitro route daily     blood glucose monitoring (ACCU-CHEK UCHE PLUS) meter device kit TEST BLOOD SUGAR ONE TIME DAILY  OR AS DIRECTED     Calcium Carbonate-Vitamin D (CALCIUM 500 + D PO)      calcium-vitamin D (CALCIUM 600 + D) 600-400 MG-UNIT per tablet Take 1 tablet by mouth daily     Cholecalciferol (VITAMIN D) 2000 units tablet Take 2,000 Units by mouth daily     Cyanocobalamin (B-12 SUPER STRENGTH) 5000 MCG/ML LIQD Place 0.5 mLs under the tongue every other day FOR VITAMIN B12 SUPPLEMENTATION, PLEASE PLACE UNDER THE TONGUE     glimepiride (AMARYL) 1 MG tablet Take 1 tablet (1 mg) by mouth every morning (before breakfast)     Lactobacillus (PROBIOTIC ACIDOPHILUS PO)      lisinopril (ZESTRIL) 20 MG tablet Take 1 tablet (20 mg) by mouth daily     multivitamin w/minerals (THERA-VIT-M) tablet Take 1 tablet by mouth daily     potassium citrate (UROCIT-K) 10 MEQ (1080 MG) CR tablet TAKE 1 TABLET BY MOUTH TWICE A DAY     simvastatin (ZOCOR) 20 MG tablet Take 1 tablet (20 mg) by mouth At Bedtime     tamsulosin (FLOMAX) 0.4 MG capsule Take 2 capsules (0.8 mg) by mouth daily     Alcohol Swabs (B-D SINGLE USE SWABS REGULAR) PADS 1 pad daily (Patient not taking: Reported on 9/25/2020)     blood glucose calibration (ACCU-CHEK UCHE) solution Use to calibrate blood glucose monitor as needed as directed. (Patient not taking: Reported on 9/25/2020)     Ferrous Sulfate (IRON PO)      No current facility-administered medications for this visit.              Review of Systems:     Skin: negative  Eyes: negative  Ears/Nose/Throat: negative  Respiratory: No shortness of breath, dyspnea on exertion, cough, or hemoptysis  Cardiovascular: negative  Gastrointestinal: negative  Genitourinary: as above  Musculoskeletal: negative  Neurologic: negative  Psychiatric: negative  Hematologic/Lymphatic/Immunologic: negative  Endocrine: as above          Physical Exam:   No data found.  Body mass index is 28.28 kg/m .     General:  age-appropriate appearing male in NAD  HEENT: Head AT/NC, EOMI, CN Grossly intact  Lungs: no respiratory distress, or pursed lip breathing  Heart: No obvious jugular venous distension present  Back: no bony midline tenderness, no CVAT bilaterally.  Abdomen: soft, obesely-distended, non-tender. No organomegaly  : No costovertebral tenderness bilaterally   Lymph: no palpable inguinal lymphadenopathy.  LE: no edema.   Musculoskeltal: extremities normal, no peripheral edema  Skin: no suspicious lesions or rashes  Neuro: Alert, oriented, speech and mentation normal;  moving all 4 extremities equally.  Psych: affect and mood normal          Data:   All laboratory data reviewed:    UA RESULTS:  Recent Labs   Lab Test 10/11/16  1441 09/27/16  0840   COLOR Yellow  --    APPEARANCE Clear  --    URINEGLC >=1000*  --    URINEBILI Negative  --    URINEKETONE Negative  --    SG 1.025  --    UBLD Large*  --    URINEPH 5.5  --    PROTEIN 30*  --    UROBILINOGEN 0.2  --    NITRITE Negative  --    LEUKEST Negative  --    RBCU  --  159   WBCU  --  2      PSA   Date Value Ref Range Status   09/30/2010 0.83 0 - 4 ug/L Final     Creatinine   Date Value Ref Range Status   09/10/2020 1.82 (H) 0.66 - 1.25 mg/dL Final   08/17/2020 2.21 (H) 0.66 - 1.25 mg/dL Final   05/17/2019 1.41 (H) 0.66 - 1.25 mg/dL Final   05/21/2018 1.11 0.66 - 1.25 mg/dL Final   08/12/2017 1.11 0.66 - 1.25 mg/dL Final       IMAGING:  All pertinent imaging reviewed:    All imaging studies reviewed by me.  I personally reviewed these imaging films.  A formal report from radiology will follow.    CT ABD/PEL 9/19/20:  FINDINGS:   LOWER CHEST: Normal.     HEPATOBILIARY: There are small calcified gallstones. The liver is  unremarkable.     PANCREAS: Normal.     SPLEEN: Normal.     ADRENAL GLANDS: Nodular thickening left adrenal gland unchanged and  compatible with adenoma. Right adrenal gland is normal.     KIDNEYS/BLADDER: There are multiple large bilateral urinary  tract  calculi. No ureteral calcifications. A large calculus at the left  ureteropelvic junction measures up to 1.6 cm. Mild left  hydronephrosis. Mild left perinephric infiltration.     BOWEL: Evidence of prior bariatric surgery. No bowel obstruction or  free air. The appendix has been removed.     LYMPH NODES: Unremarkable.     VASCULATURE: Mild nonaneurysmal aortic atherosclerosis.     PELVIC ORGANS: Normal.     OTHER: Small fat-containing bilateral inguinal hernias.     MUSCULOSKELETAL: Unremarkable.                                                        IMPRESSION:   1.  Mild left hydronephrosis with a large 1.6 cm calculus at the left  ureteropelvic junction.  2.  Numerous large bilateral urinary tract calculi. Stone burden  increased compared to 2016.                 Impression and Plan:   Impression:   70 year old man with a 1.6cm LEFT UPJ stone and large bilateral kidney stones      Plan:   LEFT UPJ and kidney stones  - 1.6cm UPJ stone and 2.0 x 1.8cm partial staghorn LEFT lower pole stone  - We discussed treatment options which include:  ---- ESWL: We discussed that given the stone size this would not be a recommended treatment modality  ---- URETEROSCOPY: we discussed that there would be 100% chance of requiring a staged procedure. We discussed the need for a ureteral stent.  ---- PCNL: we discussed a 95% chance of stone clearance with a single procedure, the need for an overnight stay, a ~5% chance of blood transfusion or serious infection  - He would like to proceed with a LEFT PCNL  - Orders for surgery placed     RIGHT kidney stones  - We discussed that we will discuss options for the right side following clearance of the left side    DM  - follows with PCP     Thank you for the kind consultation.    Time spent: 30 minutes of which >50% was spent counseling.    Asher Le MD   Urology  [facility]  [facility] Phone: [phone]  [facility]  Phone: [phone]

## 2020-09-25 NOTE — PROGRESS NOTES
Urology Consult History and Physical  [facility]  Name: Los Renee    MRN: [id number]   YOB: 1950       We were asked to see Los Renee at the request of Dr. Cheung for evaluation and treatment of Bilateral nephrolithiasis.        Chief Complaint:   Bilateral nephrolithiasis    History is obtained from the patient            History of Present Illness:   Los Renee is a 70 year old male who is being seen for evaluation of Bilateral nephrolithiasis.     History of bilateral nephrolithiasis  - ESWL with Dr. Ricketts 6/1/2015 for 1.1cm RIGHT lower pole stone  - Ureteroscopy with Dr. Freitas for RIGHT side 11/3/2016    He did not follow up after that time  He was noted to have rise of his SCr to 2.0 from baseline of 1.1-1.4. A renal U/S was obtained which demonstrated LEFT hydronephrosis with probably ureteral stone.  A CT scan was obtained which demonstrated a LEFT 1.6cm LEFT UPJ stone with additional large bilateral kidney stones  He was referred for further management.   He denies flank pain or symptoms related to the stones    His other past medical history as noted below             Past Medical History:     Past Medical History:   Diagnosis Date     Arthritis      Gastric bypass status for obesity      Gastro-oesophageal reflux disease      GI bleed 11/1/2012     Intermittent asthma     related to allergies- rare symptoms     Mixed hyperlipidemia 1/7/2008     Morbid obesity (H) 01/24/2008    s/p gastric bypass surgery     Morbid obesity (H) 1/24/2008     Mumps     in the early 60's     Nephrolithiasis      SOLITARIO (obstructive sleep apnea) 2010          Other chronic pain     chronic left knee pain     Spider veins      Swelling of testicle      Type 2 diabetes mellitus (H) 01/07/2008     Unspecified essential hypertension 1/7/2008    resolved since gastric surgery            Past Surgical History:     Past Surgical History:   Procedure Laterality Date     COLONOSCOPY N/A 7/5/2018     Procedure: COMBINED COLONOSCOPY, SINGLE OR MULTIPLE BIOPSY/POLYPECTOMY BY BIOPSY;  colonoscopy;  Surgeon: Max Santizo MD;  Location:  GI     CYSTOSCOPY       EXTRACORPOREAL SHOCK WAVE LITHOTRIPSY, CYSTOSCOPY, INSERT STENT URETER(S), COMBINED Right 6/1/2015    Procedure: COMBINED EXTRACORPOREAL SHOCK WAVE LITHOTRIPSY, CYSTOSCOPY, INSERT STENT URETER(S);  Surgeon: Jeovanny Ricketts MD;  Location:  OR     KNEE SURGERY       LAPAROSCOPIC BYPASS GASTRIC       LASER HOLMIUM LITHOTRIPSY URETER(S), INSERT STENT, COMBINED Right 11/3/2016    Procedure: COMBINED CYSTOSCOPY, URETEROSCOPY, LASER HOLMIUM LITHOTRIPSY URETER(S), INSERT STENT;  Surgeon: Neli Freitas MD;  Location:  OR     ORTHOPEDIC SURGERY      bilat knee replaCEMENTS     VASECTOMY              Social History:     Social History     Tobacco Use     Smoking status: Never Smoker     Smokeless tobacco: Never Used   Substance Use Topics     Alcohol use: No     Alcohol/week: 0.0 standard drinks       History   Smoking Status     Never Smoker   Smokeless Tobacco     Never Used            Family History:     Family History   Problem Relation Age of Onset     C.A.D. Mother      Diabetes Mother      Breast Cancer Mother      Cerebrovascular Disease Mother      Alcohol/Drug Mother      Eye Disorder Mother      Heart Disease Mother      C.A.D. Father      Diabetes Father      Alcohol/Drug Father      Leukemia Father               Allergies:     Allergies   Allergen Reactions     Simvastatin Nausea            Medications:     Current Outpatient Medications   Medication Sig     blood glucose (ACCU-CHEK UCHE PLUS) test strip 1 strip by In Vitro route daily     blood glucose monitoring (ACCU-CHEK UCHE PLUS) meter device kit TEST BLOOD SUGAR ONE TIME DAILY  OR AS DIRECTED     Calcium Carbonate-Vitamin D (CALCIUM 500 + D PO)      calcium-vitamin D (CALCIUM 600 + D) 600-400 MG-UNIT per tablet Take 1 tablet by mouth daily     Cholecalciferol  (VITAMIN D) 2000 units tablet Take 2,000 Units by mouth daily     Cyanocobalamin (B-12 SUPER STRENGTH) 5000 MCG/ML LIQD Place 0.5 mLs under the tongue every other day FOR VITAMIN B12 SUPPLEMENTATION, PLEASE PLACE UNDER THE TONGUE     glimepiride (AMARYL) 1 MG tablet Take 1 tablet (1 mg) by mouth every morning (before breakfast)     Lactobacillus (PROBIOTIC ACIDOPHILUS PO)      lisinopril (ZESTRIL) 20 MG tablet Take 1 tablet (20 mg) by mouth daily     multivitamin w/minerals (THERA-VIT-M) tablet Take 1 tablet by mouth daily     potassium citrate (UROCIT-K) 10 MEQ (1080 MG) CR tablet TAKE 1 TABLET BY MOUTH TWICE A DAY     simvastatin (ZOCOR) 20 MG tablet Take 1 tablet (20 mg) by mouth At Bedtime     tamsulosin (FLOMAX) 0.4 MG capsule Take 2 capsules (0.8 mg) by mouth daily     Alcohol Swabs (B-D SINGLE USE SWABS REGULAR) PADS 1 pad daily (Patient not taking: Reported on 9/25/2020)     blood glucose calibration (ACCU-CHEK UCHE) solution Use to calibrate blood glucose monitor as needed as directed. (Patient not taking: Reported on 9/25/2020)     Ferrous Sulfate (IRON PO)      No current facility-administered medications for this visit.              Review of Systems:     Skin: negative  Eyes: negative  Ears/Nose/Throat: negative  Respiratory: No shortness of breath, dyspnea on exertion, cough, or hemoptysis  Cardiovascular: negative  Gastrointestinal: negative  Genitourinary: as above  Musculoskeletal: negative  Neurologic: negative  Psychiatric: negative  Hematologic/Lymphatic/Immunologic: negative  Endocrine: as above          Physical Exam:   No data found.  Body mass index is 28.28 kg/m .     General: age-appropriate appearing male in NAD  HEENT: Head AT/NC, EOMI, CN Grossly intact  Lungs: no respiratory distress, or pursed lip breathing  Heart: No obvious jugular venous distension present  Back: no bony midline tenderness, no CVAT bilaterally.  Abdomen: soft, obesely-distended, non-tender. No organomegaly  : No  costovertebral tenderness bilaterally   Lymph: no palpable inguinal lymphadenopathy.  LE: no edema.   Musculoskeltal: extremities normal, no peripheral edema  Skin: no suspicious lesions or rashes  Neuro: Alert, oriented, speech and mentation normal;  moving all 4 extremities equally.  Psych: affect and mood normal          Data:   All laboratory data reviewed:    UA RESULTS:  Recent Labs   Lab Test 10/11/16  1441 09/27/16  0840   COLOR Yellow  --    APPEARANCE Clear  --    URINEGLC >=1000*  --    URINEBILI Negative  --    URINEKETONE Negative  --    SG 1.025  --    UBLD Large*  --    URINEPH 5.5  --    PROTEIN 30*  --    UROBILINOGEN 0.2  --    NITRITE Negative  --    LEUKEST Negative  --    RBCU  --  159   WBCU  --  2      PSA   Date Value Ref Range Status   09/30/2010 0.83 0 - 4 ug/L Final     Creatinine   Date Value Ref Range Status   09/10/2020 1.82 (H) 0.66 - 1.25 mg/dL Final   08/17/2020 2.21 (H) 0.66 - 1.25 mg/dL Final   05/17/2019 1.41 (H) 0.66 - 1.25 mg/dL Final   05/21/2018 1.11 0.66 - 1.25 mg/dL Final   08/12/2017 1.11 0.66 - 1.25 mg/dL Final       IMAGING:  All pertinent imaging reviewed:    All imaging studies reviewed by me.  I personally reviewed these imaging films.  A formal report from radiology will follow.    CT ABD/PEL 9/19/20:  FINDINGS:   LOWER CHEST: Normal.     HEPATOBILIARY: There are small calcified gallstones. The liver is  unremarkable.     PANCREAS: Normal.     SPLEEN: Normal.     ADRENAL GLANDS: Nodular thickening left adrenal gland unchanged and  compatible with adenoma. Right adrenal gland is normal.     KIDNEYS/BLADDER: There are multiple large bilateral urinary tract  calculi. No ureteral calcifications. A large calculus at the left  ureteropelvic junction measures up to 1.6 cm. Mild left  hydronephrosis. Mild left perinephric infiltration.     BOWEL: Evidence of prior bariatric surgery. No bowel obstruction or  free air. The appendix has been removed.     LYMPH NODES:  Unremarkable.     VASCULATURE: Mild nonaneurysmal aortic atherosclerosis.     PELVIC ORGANS: Normal.     OTHER: Small fat-containing bilateral inguinal hernias.     MUSCULOSKELETAL: Unremarkable.                                                        IMPRESSION:   1.  Mild left hydronephrosis with a large 1.6 cm calculus at the left  ureteropelvic junction.  2.  Numerous large bilateral urinary tract calculi. Stone burden  increased compared to 2016.                 Impression and Plan:   Impression:   70 year old man with a 1.6cm LEFT UPJ stone and large bilateral kidney stones      Plan:   LEFT UPJ and kidney stones  - 1.6cm UPJ stone and 2.0 x 1.8cm partial staghorn LEFT lower pole stone  - We discussed treatment options which include:  ---- ESWL: We discussed that given the stone size this would not be a recommended treatment modality  ---- URETEROSCOPY: we discussed that there would be 100% chance of requiring a staged procedure. We discussed the need for a ureteral stent.  ---- PCNL: we discussed a 95% chance of stone clearance with a single procedure, the need for an overnight stay, a ~5% chance of blood transfusion or serious infection  - He would like to proceed with a LEFT PCNL  - Orders for surgery placed     RIGHT kidney stones  - We discussed that we will discuss options for the right side following clearance of the left side    DM  - follows with PCP     Thank you for the kind consultation.    Time spent: 30 minutes of which >50% was spent counseling.    Asher Le MD   Urology  [facility]  [facility] Phone: [phone]  [facility] Phone: [phone]

## 2020-09-28 ENCOUNTER — NURSE TRIAGE (OUTPATIENT)
Dept: INTERNAL MEDICINE | Facility: CLINIC | Age: 70
End: 2020-09-28

## 2020-09-28 DIAGNOSIS — E11.42 TYPE 2 DIABETES MELLITUS WITH DIABETIC POLYNEUROPATHY, WITHOUT LONG-TERM CURRENT USE OF INSULIN (H): ICD-10-CM

## 2020-09-28 DIAGNOSIS — N17.9 ACUTE RENAL FAILURE, UNSPECIFIED ACUTE RENAL FAILURE TYPE (H): Primary | ICD-10-CM

## 2020-09-28 DIAGNOSIS — Z11.59 ENCOUNTER FOR SCREENING FOR OTHER VIRAL DISEASES: Primary | ICD-10-CM

## 2020-09-28 DIAGNOSIS — N40.1 BENIGN PROSTATIC HYPERPLASIA WITH LOWER URINARY TRACT SYMPTOMS, SYMPTOM DETAILS UNSPECIFIED: ICD-10-CM

## 2020-09-28 DIAGNOSIS — R19.7 DIARRHEA, UNSPECIFIED TYPE: ICD-10-CM

## 2020-09-28 PROBLEM — N20.0 NEPHROLITHIASIS: Status: ACTIVE | Noted: 2020-09-28

## 2020-09-28 PROBLEM — N13.30 HYDRONEPHROSIS OF LEFT KIDNEY: Status: ACTIVE | Noted: 2020-09-28

## 2020-09-28 RX ORDER — GLIMEPIRIDE 1 MG/1
2 TABLET ORAL
Qty: 90 TABLET | Refills: 0 | Status: ON HOLD
Start: 2020-09-28 | End: 2020-09-29

## 2020-09-28 RX ORDER — TAMSULOSIN HYDROCHLORIDE 0.4 MG/1
0.4 CAPSULE ORAL DAILY
Qty: 90 CAPSULE | Refills: 0
Start: 2020-09-28 | End: 2021-02-22

## 2020-09-28 NOTE — TELEPHONE ENCOUNTER
Call from patient today.     States he is not doing very well since 9/10/2020 when there was a change in his medications.     Experiencing weakness, >200 BS, darker than normal brown/loose stools    For the last 2 days patient has not taken ANY medications. Thought that the changes in medications was causing his weakness and loose/darker brown stools, but they are still present after stopping medications for 2 days.     Drinking plenty of fluids.    Requesting to restart his Jentadueto because symptoms started when this medication was stopped. Has better control of his BS when he was taking that medication. Does not have surgery for a few weeks.     Just FYI he was taking his glimepiride 1mg 2 times daily when he was taking (stopped 2 days ago)        Additional Information    Negative: Severe difficulty breathing (e.g., struggling for each breath, speaks in single words)    Negative: Shock suspected (e.g., cold/pale/clammy skin, too weak to stand, low BP, rapid pulse)    Negative: Difficult to awaken or acting confused (e.g., disoriented, slurred speech)    Negative: Fainted > 15 minutes ago and still feels too weak or dizzy to stand    Negative: SEVERE weakness (i.e., unable to walk or barely able to walk, requires support) and new onset or worsening    Negative: Sounds like a life-threatening emergency to the triager    Negative: Weakness of the face, arm or leg on one side of the body    Negative: Has diabetes and weakness from low blood sugar (i.e., < 60 mg/dL or 3.5 mmol/L)    Negative: Recent heat exposure, suspected cause of weakness    Negative: Vomiting is the main symptom    Negative: Diarrhea is the main symptom    Negative: Difficulty breathing    Negative: Heart beating < 50 beats per minute OR > 140 beats per minute    Negative: Extra heartbeats OR irregular heart beating (i.e., 'palpitations')    Negative: Follows bleeding (e.g., from vomiting, rectum, vagina)    Negative: Bloody, black, or tarry  "bowel movements    Negative: MODERATE weakness from poor fluid intake with no improvement after 2 hours of rest and fluids    Negative: Drinking very little and dehydration suspected (e.g., no urine > 12 hours, very dry mouth, very lightheaded)    Negative: Patient sounds very sick or weak to the triager    Negative: MODERATE weakness (i.e., interferes with work, school, normal activities) and cause unknown    Negative: Fever > 103 F (39.4 C) and not able to get the fever down using CARE ADVICE    Negative: Fever > 100.0 F (37.8 C) and bedridden (e.g., nursing home patient, stroke, chronic illness, recovering from surgery)    Negative: Fever > 101 F (38.3 C) and over 60 years of age    Negative: Fever > 100.0 F (37.8 C) and diabetes mellitus or weak immune system (e.g., HIV positive, cancer chemo, splenectomy, organ transplant, chronic steroids)    Negative: Pale skin (pallor)    Negative: MODERATE weakness (i.e., interferes with work, school, normal activities) and persists > 3 days    Negative: Taking a medicine that could cause weakness (e.g., blood pressure medications, diuretics)    Negative: Patient wants to be seen    Negative: MILD weakness (i.e., does not interfere with ability to work, go to school, normal activities) and persists > 1 week    Negative: Fatigue (i.e., tires easily, decreased energy) and persists > 1 week    Answer Assessment - Initial Assessment Questions  1. DESCRIPTION: \"Describe how you are feeling.\"      Feeling weakness. Unable to complete the tasks that he was doing just fine before   2. SEVERITY: \"How bad is it?\"  \"Can you stand and walk?\"    - MILD - Feels weak or tired, but does not interfere with work, school or normal activities    - MODERATE - Able to stand and walk; weakness interferes with work, school, or normal activities    - SEVERE - Unable to stand or walk      Mild interfering with daily tasks.   3. ONSET:  \"When did the weakness begin?\"      3 days   4. CAUSE: \"What do " "you think is causing the weakness?\"      increase in blood sugars/  5. MEDICINES: \"Have you recently started a new medicine or had a change in the amount of a medicine?\"      See note for more information. Yes, changes in meds  6. OTHER SYMPTOMS: \"Do you have any other symptoms?\" (e.g., chest pain, fever, cough, SOB, vomiting, diarrhea, bleeding, other areas of pain)      Darker stools/loose stools (3 days as well)  7. PREGNANCY: \"Is there any chance you are pregnant?\" \"When was your last menstrual period?\"      n/a    Protocols used: WEAKNESS (GENERALIZED) AND FATIGUE-A-OH      "

## 2020-09-29 ENCOUNTER — HOSPITAL ENCOUNTER (INPATIENT)
Facility: CLINIC | Age: 70
LOS: 2 days | Discharge: HOME | DRG: 393 | End: 2020-10-01
Attending: EMERGENCY MEDICINE | Admitting: HOSPITALIST
Payer: COMMERCIAL

## 2020-09-29 ENCOUNTER — NURSE TRIAGE (OUTPATIENT)
Dept: INTERNAL MEDICINE | Facility: CLINIC | Age: 70
End: 2020-09-29

## 2020-09-29 ENCOUNTER — OFFICE VISIT (OUTPATIENT)
Dept: URGENT CARE | Facility: URGENT CARE | Age: 70
End: 2020-09-29
Payer: COMMERCIAL

## 2020-09-29 VITALS
RESPIRATION RATE: 16 BRPM | BODY MASS INDEX: 28.28 KG/M2 | TEMPERATURE: 98.4 F | DIASTOLIC BLOOD PRESSURE: 58 MMHG | SYSTOLIC BLOOD PRESSURE: 125 MMHG | OXYGEN SATURATION: 100 % | HEART RATE: 107 BPM | WEIGHT: 186 LBS

## 2020-09-29 DIAGNOSIS — E11.65 TYPE 2 DIABETES MELLITUS WITH HYPERGLYCEMIA, WITHOUT LONG-TERM CURRENT USE OF INSULIN (H): ICD-10-CM

## 2020-09-29 DIAGNOSIS — K92.2 ACUTE UPPER GI BLEED: Primary | ICD-10-CM

## 2020-09-29 DIAGNOSIS — R17 JAUNDICE: ICD-10-CM

## 2020-09-29 DIAGNOSIS — D62 ANEMIA DUE TO BLOOD LOSS, ACUTE: ICD-10-CM

## 2020-09-29 DIAGNOSIS — R42 DIZZINESS: ICD-10-CM

## 2020-09-29 DIAGNOSIS — K92.1 BLOOD IN STOOL: Primary | ICD-10-CM

## 2020-09-29 DIAGNOSIS — K92.2 UPPER GI BLEED: ICD-10-CM

## 2020-09-29 LAB
ALBUMIN SERPL-MCNC: 3.1 G/DL (ref 3.4–5)
ALP SERPL-CCNC: 77 U/L (ref 40–150)
ALT SERPL W P-5'-P-CCNC: 24 U/L (ref 0–70)
ANION GAP SERPL CALCULATED.3IONS-SCNC: 6 MMOL/L (ref 3–14)
APTT PPP: 20 SEC (ref 22–37)
APTT PPP: 28 SEC (ref 22–37)
AST SERPL W P-5'-P-CCNC: 12 U/L (ref 0–45)
BASOPHILS # BLD AUTO: 0 10E9/L (ref 0–0.2)
BASOPHILS NFR BLD AUTO: 0.4 %
BILIRUB DIRECT SERPL-MCNC: <0.1 MG/DL (ref 0–0.2)
BILIRUB SERPL-MCNC: 0.3 MG/DL (ref 0.2–1.3)
BLD PROD TYP BPU: NORMAL
BLD UNIT ID BPU: 0
BLOOD PRODUCT CODE: NORMAL
BPU ID: NORMAL
BUN SERPL-MCNC: 60 MG/DL (ref 7–30)
CALCIUM SERPL-MCNC: 8.4 MG/DL (ref 8.5–10.1)
CHLORIDE SERPL-SCNC: 107 MMOL/L (ref 94–109)
CO2 SERPL-SCNC: 20 MMOL/L (ref 20–32)
CREAT SERPL-MCNC: 1.64 MG/DL (ref 0.66–1.25)
DIFFERENTIAL METHOD BLD: ABNORMAL
EOSINOPHIL # BLD AUTO: 0 10E9/L (ref 0–0.7)
EOSINOPHIL NFR BLD AUTO: 0.5 %
ERYTHROCYTE [DISTWIDTH] IN BLOOD BY AUTOMATED COUNT: 13.2 % (ref 10–15)
GFR SERPL CREATININE-BSD FRML MDRD: 42 ML/MIN/{1.73_M2}
GLUCOSE BLDC GLUCOMTR-MCNC: 249 MG/DL (ref 70–99)
GLUCOSE SERPL-MCNC: 392 MG/DL (ref 70–99)
HCT VFR BLD AUTO: 20 % (ref 40–53)
HEMOCCULT STL QL: POSITIVE
HGB BLD-MCNC: 6.8 G/DL (ref 13.3–17.7)
IMM GRANULOCYTES # BLD: 0 10E9/L (ref 0–0.4)
IMM GRANULOCYTES NFR BLD: 0.4 %
INR PPP: 1.2 (ref 0.86–1.14)
INR PPP: 1.2 (ref 0.86–1.14)
LABORATORY COMMENT REPORT: NORMAL
LYMPHOCYTES # BLD AUTO: 0.9 10E9/L (ref 0.8–5.3)
LYMPHOCYTES NFR BLD AUTO: 16 %
MCH RBC QN AUTO: 30.5 PG (ref 26.5–33)
MCHC RBC AUTO-ENTMCNC: 34 G/DL (ref 31.5–36.5)
MCV RBC AUTO: 90 FL (ref 78–100)
MONOCYTES # BLD AUTO: 0.5 10E9/L (ref 0–1.3)
MONOCYTES NFR BLD AUTO: 8 %
NEUTROPHILS # BLD AUTO: 4.2 10E9/L (ref 1.6–8.3)
NEUTROPHILS NFR BLD AUTO: 74.7 %
NRBC # BLD AUTO: 0 10*3/UL
NRBC BLD AUTO-RTO: 0 /100
PLATELET # BLD AUTO: 234 10E9/L (ref 150–450)
POTASSIUM SERPL-SCNC: 4.9 MMOL/L (ref 3.4–5.3)
PROT SERPL-MCNC: 6.6 G/DL (ref 6.8–8.8)
RBC # BLD AUTO: 2.23 10E12/L (ref 4.4–5.9)
SARS-COV-2 RNA SPEC QL NAA+PROBE: NEGATIVE
SARS-COV-2 RNA SPEC QL NAA+PROBE: NORMAL
SODIUM SERPL-SCNC: 133 MMOL/L (ref 133–144)
SPECIMEN SOURCE: NORMAL
SPECIMEN SOURCE: NORMAL
TRANSFUSION STATUS PATIENT QL: NORMAL
TRANSFUSION STATUS PATIENT QL: NORMAL
WBC # BLD AUTO: 5.6 10E9/L (ref 4–11)

## 2020-09-29 PROCEDURE — 25800030 ZZH RX IP 258 OP 636: Performed by: EMERGENCY MEDICINE

## 2020-09-29 PROCEDURE — 12000000 ZZH R&B MED SURG/OB

## 2020-09-29 PROCEDURE — 85610 PROTHROMBIN TIME: CPT | Performed by: HOSPITALIST

## 2020-09-29 PROCEDURE — 36430 TRANSFUSION BLD/BLD COMPNT: CPT

## 2020-09-29 PROCEDURE — U0003 INFECTIOUS AGENT DETECTION BY NUCLEIC ACID (DNA OR RNA); SEVERE ACUTE RESPIRATORY SYNDROME CORONAVIRUS 2 (SARS-COV-2) (CORONAVIRUS DISEASE [COVID-19]), AMPLIFIED PROBE TECHNIQUE, MAKING USE OF HIGH THROUGHPUT TECHNOLOGIES AS DESCRIBED BY CMS-2020-01-R: HCPCS | Performed by: EMERGENCY MEDICINE

## 2020-09-29 PROCEDURE — 99214 OFFICE O/P EST MOD 30 MIN: CPT | Performed by: PHYSICIAN ASSISTANT

## 2020-09-29 PROCEDURE — 86850 RBC ANTIBODY SCREEN: CPT | Performed by: EMERGENCY MEDICINE

## 2020-09-29 PROCEDURE — 80048 BASIC METABOLIC PNL TOTAL CA: CPT | Performed by: EMERGENCY MEDICINE

## 2020-09-29 PROCEDURE — 85730 THROMBOPLASTIN TIME PARTIAL: CPT | Performed by: HOSPITALIST

## 2020-09-29 PROCEDURE — 25800030 ZZH RX IP 258 OP 636: Performed by: HOSPITALIST

## 2020-09-29 PROCEDURE — 85730 THROMBOPLASTIN TIME PARTIAL: CPT | Performed by: EMERGENCY MEDICINE

## 2020-09-29 PROCEDURE — 99223 1ST HOSP IP/OBS HIGH 75: CPT | Mod: AI | Performed by: HOSPITALIST

## 2020-09-29 PROCEDURE — 96360 HYDRATION IV INFUSION INIT: CPT

## 2020-09-29 PROCEDURE — 82272 OCCULT BLD FECES 1-3 TESTS: CPT | Performed by: EMERGENCY MEDICINE

## 2020-09-29 PROCEDURE — 36415 COLL VENOUS BLD VENIPUNCTURE: CPT | Performed by: HOSPITALIST

## 2020-09-29 PROCEDURE — C9113 INJ PANTOPRAZOLE SODIUM, VIA: HCPCS | Performed by: HOSPITALIST

## 2020-09-29 PROCEDURE — 80076 HEPATIC FUNCTION PANEL: CPT | Performed by: EMERGENCY MEDICINE

## 2020-09-29 PROCEDURE — 00000146 ZZHCL STATISTIC GLUCOSE BY METER IP

## 2020-09-29 PROCEDURE — 99285 EMERGENCY DEPT VISIT HI MDM: CPT | Mod: 25

## 2020-09-29 PROCEDURE — 85610 PROTHROMBIN TIME: CPT | Performed by: EMERGENCY MEDICINE

## 2020-09-29 PROCEDURE — P9016 RBC LEUKOCYTES REDUCED: HCPCS | Performed by: EMERGENCY MEDICINE

## 2020-09-29 PROCEDURE — 25000128 H RX IP 250 OP 636: Performed by: HOSPITALIST

## 2020-09-29 PROCEDURE — 86922 COMPATIBILITY TEST ANTIGLOB: CPT | Performed by: EMERGENCY MEDICINE

## 2020-09-29 PROCEDURE — 85025 COMPLETE CBC W/AUTO DIFF WBC: CPT | Performed by: EMERGENCY MEDICINE

## 2020-09-29 PROCEDURE — C9803 HOPD COVID-19 SPEC COLLECT: HCPCS

## 2020-09-29 PROCEDURE — 86900 BLOOD TYPING SEROLOGIC ABO: CPT | Performed by: EMERGENCY MEDICINE

## 2020-09-29 PROCEDURE — 86901 BLOOD TYPING SEROLOGIC RH(D): CPT | Performed by: EMERGENCY MEDICINE

## 2020-09-29 PROCEDURE — 85018 HEMOGLOBIN: CPT | Performed by: HOSPITALIST

## 2020-09-29 RX ORDER — SODIUM CHLORIDE 9 MG/ML
INJECTION, SOLUTION INTRAVENOUS CONTINUOUS
Status: DISCONTINUED | OUTPATIENT
Start: 2020-09-30 | End: 2020-10-01

## 2020-09-29 RX ORDER — DEXTROSE MONOHYDRATE 25 G/50ML
25-50 INJECTION, SOLUTION INTRAVENOUS
Status: DISCONTINUED | OUTPATIENT
Start: 2020-09-29 | End: 2020-10-01 | Stop reason: HOSPADM

## 2020-09-29 RX ORDER — LIDOCAINE 40 MG/G
CREAM TOPICAL
Status: DISCONTINUED | OUTPATIENT
Start: 2020-09-29 | End: 2020-10-01 | Stop reason: HOSPADM

## 2020-09-29 RX ORDER — ACETAMINOPHEN 325 MG/1
650 TABLET ORAL EVERY 4 HOURS PRN
Status: DISCONTINUED | OUTPATIENT
Start: 2020-09-29 | End: 2020-10-01 | Stop reason: HOSPADM

## 2020-09-29 RX ORDER — SIMVASTATIN 20 MG
20 TABLET ORAL AT BEDTIME
Status: DISCONTINUED | OUTPATIENT
Start: 2020-09-29 | End: 2020-10-01 | Stop reason: HOSPADM

## 2020-09-29 RX ORDER — FERROUS SULFATE 325(65) MG
325 TABLET ORAL
COMMUNITY
End: 2020-10-19

## 2020-09-29 RX ORDER — POTASSIUM CL/LIDO/0.9 % NACL 10MEQ/0.1L
10 INTRAVENOUS SOLUTION, PIGGYBACK (ML) INTRAVENOUS
Status: DISCONTINUED | OUTPATIENT
Start: 2020-09-29 | End: 2020-10-01 | Stop reason: HOSPADM

## 2020-09-29 RX ORDER — ACETAMINOPHEN 650 MG/1
650 SUPPOSITORY RECTAL EVERY 4 HOURS PRN
Status: DISCONTINUED | OUTPATIENT
Start: 2020-09-29 | End: 2020-10-01 | Stop reason: HOSPADM

## 2020-09-29 RX ORDER — GLIMEPIRIDE 2 MG/1
1 TABLET ORAL 2 TIMES DAILY WITH MEALS
COMMUNITY
End: 2020-10-12

## 2020-09-29 RX ORDER — POTASSIUM CITRATE 5 MEQ/1
10 TABLET, EXTENDED RELEASE ORAL 2 TIMES DAILY
Status: DISCONTINUED | OUTPATIENT
Start: 2020-09-29 | End: 2020-10-01 | Stop reason: HOSPADM

## 2020-09-29 RX ORDER — PROCHLORPERAZINE MALEATE 5 MG
5 TABLET ORAL EVERY 6 HOURS PRN
Status: DISCONTINUED | OUTPATIENT
Start: 2020-09-29 | End: 2020-10-01 | Stop reason: HOSPADM

## 2020-09-29 RX ORDER — NALOXONE HYDROCHLORIDE 0.4 MG/ML
.1-.4 INJECTION, SOLUTION INTRAMUSCULAR; INTRAVENOUS; SUBCUTANEOUS
Status: DISCONTINUED | OUTPATIENT
Start: 2020-09-29 | End: 2020-10-01 | Stop reason: HOSPADM

## 2020-09-29 RX ORDER — TAMSULOSIN HYDROCHLORIDE 0.4 MG/1
0.4 CAPSULE ORAL DAILY
Status: DISCONTINUED | OUTPATIENT
Start: 2020-09-30 | End: 2020-10-01 | Stop reason: HOSPADM

## 2020-09-29 RX ORDER — NICOTINE POLACRILEX 4 MG
15-30 LOZENGE BUCCAL
Status: DISCONTINUED | OUTPATIENT
Start: 2020-09-29 | End: 2020-10-01 | Stop reason: HOSPADM

## 2020-09-29 RX ORDER — POTASSIUM CHLORIDE 29.8 MG/ML
20 INJECTION INTRAVENOUS
Status: DISCONTINUED | OUTPATIENT
Start: 2020-09-29 | End: 2020-10-01 | Stop reason: HOSPADM

## 2020-09-29 RX ORDER — ONDANSETRON 2 MG/ML
4 INJECTION INTRAMUSCULAR; INTRAVENOUS EVERY 6 HOURS PRN
Status: DISCONTINUED | OUTPATIENT
Start: 2020-09-29 | End: 2020-10-01 | Stop reason: HOSPADM

## 2020-09-29 RX ORDER — POTASSIUM CHLORIDE 1.5 G/1.58G
20-40 POWDER, FOR SOLUTION ORAL
Status: DISCONTINUED | OUTPATIENT
Start: 2020-09-29 | End: 2020-10-01 | Stop reason: HOSPADM

## 2020-09-29 RX ORDER — ONDANSETRON 4 MG/1
4 TABLET, ORALLY DISINTEGRATING ORAL EVERY 6 HOURS PRN
Status: DISCONTINUED | OUTPATIENT
Start: 2020-09-29 | End: 2020-10-01 | Stop reason: HOSPADM

## 2020-09-29 RX ORDER — POTASSIUM CHLORIDE 1500 MG/1
20-40 TABLET, EXTENDED RELEASE ORAL
Status: DISCONTINUED | OUTPATIENT
Start: 2020-09-29 | End: 2020-10-01 | Stop reason: HOSPADM

## 2020-09-29 RX ORDER — POTASSIUM CHLORIDE 7.45 MG/ML
10 INJECTION INTRAVENOUS
Status: DISCONTINUED | OUTPATIENT
Start: 2020-09-29 | End: 2020-10-01 | Stop reason: HOSPADM

## 2020-09-29 RX ORDER — PROCHLORPERAZINE 25 MG
12.5 SUPPOSITORY, RECTAL RECTAL EVERY 12 HOURS PRN
Status: DISCONTINUED | OUTPATIENT
Start: 2020-09-29 | End: 2020-10-01 | Stop reason: HOSPADM

## 2020-09-29 RX ADMIN — SODIUM CHLORIDE 80 MG: 9 INJECTION, SOLUTION INTRAVENOUS at 21:06

## 2020-09-29 RX ADMIN — SODIUM CHLORIDE 8 MG/HR: 9 INJECTION, SOLUTION INTRAVENOUS at 21:05

## 2020-09-29 RX ADMIN — SODIUM CHLORIDE 1000 ML: 9 INJECTION, SOLUTION INTRAVENOUS at 17:08

## 2020-09-29 ASSESSMENT — ENCOUNTER SYMPTOMS
COLOR CHANGE: 1
SHORTNESS OF BREATH: 0
ABDOMINAL PAIN: 0
DIARRHEA: 1
WEAKNESS: 1
BLOOD IN STOOL: 0
FEVER: 0
DIZZINESS: 1
COUGH: 0
VOMITING: 0
FATIGUE: 1

## 2020-09-29 ASSESSMENT — ACTIVITIES OF DAILY LIVING (ADL): ADLS_ACUITY_SCORE: 14

## 2020-09-29 ASSESSMENT — MIFFLIN-ST. JEOR
SCORE: 1591.82
SCORE: 1579.12

## 2020-09-29 NOTE — TELEPHONE ENCOUNTER
Patient informed.     See other triage note regarding recent change in stool color.    Rosanna DILLARD BSN, RN, PHN

## 2020-09-29 NOTE — TELEPHONE ENCOUNTER
He can't take metformin until GFR better  so can't use former med now  Etiology diarrhea? /darker stools.   Maybe higher dose of tamsulosin? - go back to 1 tab/day-- but if cont off ALL meds  would rec'd getting labs done then see me f2f or video.    Would increase his glimepiride to 2 mg given high glucose #.

## 2020-09-29 NOTE — TELEPHONE ENCOUNTER
"Patient notified of changes and wanting clarification if he can do 1/2 of 2 mg in am and 1/2 of 2 mg in PM for Glimepiride? Stool are brown in color . Please text pt instructions if able as he is on the \"road\" all day long .Kristin Field RN    "

## 2020-09-29 NOTE — PHARMACY-ADMISSION MEDICATION HISTORY
Pharmacy Medication History  Admission medication history interview status for the 9/29/2020  admission is complete. See EPIC admission navigator for prior to admission medications     Medication history sources: Patient Sure scripts   Medication history source reliability: Good  Adherence assessment: Good    Significant changes made to the medication list:        Additional medication history information:     Medication reconciliation completed by provider prior to medication history? No    Time spent in this activity: 25min       Prior to Admission medications    Medication Sig Last Dose Taking? Auth Provider   calcium-vitamin D (CALCIUM 600 + D) 600-400 MG-UNIT per tablet Take 1 tablet by mouth daily 9/29/2020 at Unknown time Yes Art Cheung MD   Cholecalciferol (VITAMIN D) 2000 units tablet Take 2,000 Units by mouth daily 9/29/2020 at Unknown time Yes Art Cheung MD   Cyanocobalamin (B-12 SUPER STRENGTH) 5000 MCG/ML LIQD Place 0.5 mLs under the tongue every other day FOR VITAMIN B12 SUPPLEMENTATION, PLEASE PLACE UNDER THE TONGUE 9/28/2020 at Unknown time Yes Karley Irene MD   ferrous sulfate (FEROSUL) 325 (65 Fe) MG tablet Take 325 mg by mouth daily (with breakfast) 9/29/2020 at Unknown time Yes Unknown, Entered By History   glimepiride (AMARYL) 2 MG tablet Take 1 mg by mouth 2 times daily (with meals) 9/29/2020 at Unknown time Yes Unknown, Entered By History   lisinopril (ZESTRIL) 20 MG tablet Take 1 tablet (20 mg) by mouth daily 9/29/2020 at Unknown time Yes Art Cheung MD   multivitamin w/minerals (THERA-VIT-M) tablet Take 1 tablet by mouth daily 9/29/2020 at Unknown time Yes Reported, Patient   potassium citrate (UROCIT-K) 10 MEQ (1080 MG) CR tablet TAKE 1 TABLET BY MOUTH TWICE A DAY 9/29/2020 at Unknown time Yes Art Cheung MD   simvastatin (ZOCOR) 20 MG tablet Take 1 tablet (20 mg) by mouth At Bedtime Past Week at Unknown time Yes Art Cheung MD   tamsulosin  (FLOMAX) 0.4 MG capsule Take 1 capsule (0.4 mg) by mouth daily 9/29/2020 at Unknown time Yes Art Cheung MD   blood glucose (ACCU-CHEK UCHE PLUS) test strip 1 strip by In Vitro route daily   Art Cheung MD   blood glucose monitoring (ACCU-CHEK UCHE PLUS) meter device kit TEST BLOOD SUGAR ONE TIME DAILY  OR AS DIRECTED   Art Cheung MD   Lactobacillus (PROBIOTIC ACIDOPHILUS) CAPS Take by mouth daily    Reported, Patient     Yanira Kumar PharmD

## 2020-09-29 NOTE — ED NOTES
DATE:  9/29/2020   TIME OF RECEIPT FROM LAB:  2352   LAB TEST:  Hemoglobin: 6.8  LAB VALUE:  See above   RESULTS GIVEN WITH READ-BACK TO (PROVIDER):  Brenda Conti MD  TIME LAB VALUE REPORTED TO PROVIDER:   [id number]

## 2020-09-29 NOTE — TELEPHONE ENCOUNTER
Not needed- unless having lows during the day- which he is not- take one 2 mg tab in AM w/breakfast.   See other recs- labs then see me

## 2020-09-29 NOTE — PATIENT INSTRUCTIONS
(K92.1) Blood in stool  (primary encounter diagnosis)    (R42) Dizziness      (R17) Jaundice      To ED now for further evaluation.

## 2020-09-29 NOTE — PROGRESS NOTES
"Patient presents with:  Bowel Problems: Pt states he is having magenta colored stool and he is getting surgery for kidney stones 10/21/2020. Pt also has lost weight and has no appetite    SUBJECTIVE:   Los Renee is a 70 year old male presenting with a chief complaint of     Had multiple loose stools over the weekend, \"very dark brown\"    Has been taking 2 flomax for his kidney stones, wondered if this could be causing the change in his stools.      Denies any abdominal pain, but he is NOT eating.   No appetite.  Feeling lightheaded.      Stopped taking his diabetes medication for a couple of days because of the loose stools.    Had a CT scan on 9/19/20 to diagnose the kidney stones, has had them in the past.  Has also had them removed in the past.      H/O previous GI bleed 2012      Past Medical History:   Diagnosis Date     Arthritis      Gastric bypass status for obesity      Gastro-oesophageal reflux disease      GI bleed 11/1/2012     Intermittent asthma     related to allergies- rare symptoms     Mixed hyperlipidemia 1/7/2008     Morbid obesity (H) 01/24/2008    s/p gastric bypass surgery     Morbid obesity (H) 1/24/2008     Mumps     in the early 60's     Nephrolithiasis      SOLITARIO (obstructive sleep apnea) 2010          Other chronic pain     chronic left knee pain     Spider veins      Swelling of testicle      Type 2 diabetes mellitus (H) 01/07/2008     Unspecified essential hypertension 1/7/2008    resolved since gastric surgery     Patient Active Problem List   Diagnosis     Essential hypertension     Hyperlipidemia LDL goal <70     Lumbar pain     Type 2 diabetes mellitus with diabetic polyneuropathy, without long-term current use of insulin (H)     Vitamin B6 induced neuropathy (H)     Bariatric surgery status     Vitamin B12 deficiency (non anemic)     Vitamin D deficiency     Iron deficiency anemia, unspecified iron deficiency anemia type     Renal calculus     Testosterone deficiency     Fatigue, " unspecified type     Recurrent kidney stones     CARDIOVASCULAR SCREENING; LDL GOAL LESS THAN 100     Uric acid kidney stone     Thiamine deficiency     Diarrhea due to malabsorption     Vitamin C deficiency     Retinopathy due to secondary diabetes mellitus (H)     Malnutrition following gastrointestinal surgery     ACP (advance care planning)     Hydronephrosis of left kidney     Nephrolithiasis     Social History     Tobacco Use     Smoking status: Never Smoker     Smokeless tobacco: Never Used   Substance Use Topics     Alcohol use: No     Alcohol/week: 0.0 standard drinks       ROS:  CONSTITUTIONAL:NEGATIVE for fever, chills, change in weight  INTEGUMENTARY/SKIN: NEGATIVE for worrisome rashes, moles or lesions  EYES: NEGATIVE for vision changes or irritation  ENT/MOUTH: NEGATIVE for ear, mouth and throat problems  RESP:NEGATIVE for significant cough or SOB  CV: NEGATIVE for chest pain, palpitations or peripheral edema  GI: as per HPI  : as per HPI  MUSCULOSKELETAL: negative  NEURO: as per HPI  Review of systems negative except as stated above.    OBJECTIVE  :/58   Pulse 107   Temp 98.4  F (36.9  C) (Temporal)   Resp 16   Wt 84.4 kg (186 lb)   SpO2 100%   BMI 28.28 kg/m    GENERAL APPEARANCE: alert  EYES: EOMI,  PERRL, conjunctiva mildly incteric  SKIN: no suspicious lesions or rashes    (K92.1) Blood in stool  (primary encounter diagnosis)    (R42) Dizziness      (R17) Jaundice      To ED now for further evaluation.      His wife will drive him

## 2020-09-29 NOTE — PROGRESS NOTES
RECEIVING UNIT ED HANDOFF REVIEW    ED Nurse Handoff Report was reviewed by: Macy Evans RN on September 29, 2020 at 5:40 PM

## 2020-09-29 NOTE — ED PROVIDER NOTES
"History     Chief Complaint:  Melena and Dizziness     The history is provided by the patient and the spouse.     Los Renee is a 70 year old male with a history of NIDDM and HTN who presents for evaluation of dizziness and melena. Los has a history of gastric bypass surgery and 2 subsequent upper GI bleeds. He was taking Advil which contributed to the first and has since not been using NSAIDs. His last colonoscopy was one year ago, with two found polyps, and otherwise reassuring per his report.    Over the last couple days Los has had fatigue, citing difficulty mowing his lawn which he typically completes without issue. His family also thought he looked \"jaundiced\". He was having dark brown diarrheal stools. Yesterday he had no bowel movement. This morning he became concerned when he had a \"magenta\" colored formed stool without brennen blood. He had brief abdominal discomfort later with passing flatus but otherwise denies abdominal pain. Since his bowel movement he has had crescendoing weakness and dizziness. Los denies alcohol use or liver disease. He denies cough, fever, vomiting, or shortness of breath.    Allergies:  Simvastatin     Medications:   Iron PO  Amaryl   Zestril  Flomax   Zocor  Urocit-K    Medical History:   Arthritis   GI bleed  Morbid obesity   Mumps  Chronic left knee pian   Varicose veins   Diabetes mellitus type 2   Obstructive sleep apnea   Hyperlipidemia    Asthma   Hypertension   Neuropathy   Thiamine deficiency    Surgical History   Colonoscopy   Cystoscopy   Knee surgery   Bilateral knee replacements  Vasectomy   Bypass gastric   Holmium lithotripsy     Family History:   Mother: CAD, Diabetes, Breast Cancer, Cerebrovascular disease, Eye disorder  Father:  CAD, Diabetes, Alcohol drugs, Leukemia     Social History:  Patient was accompanied to the ED by his wife.  Smoking Status: Negative   Smokeless Tobacco: Negative   Alcohol Use: Negative   Drug Use: Negative   Primary Physician: " "Art Cheung A     Review of Systems   Constitutional: Positive for fatigue. Negative for fever.   Respiratory: Negative for cough and shortness of breath.    Gastrointestinal: Positive for diarrhea. Negative for abdominal pain (brief, resolved discomfort), blood in stool and vomiting.   Skin: Positive for color change.   Neurological: Positive for dizziness and weakness.   All other systems reviewed and are negative.    Physical Exam     Patient Vitals for the past 24 hrs:   BP Temp Temp src Pulse Resp SpO2 Height Weight   09/29/20 1945 118/65 98  F (36.7  C) -- 90 20 -- -- --   09/29/20 1821 -- -- -- -- -- -- -- 84.7 kg (186 lb 12.8 oz)   09/29/20 1816 121/64 98.1  F (36.7  C) Oral 99 20 100 % -- --   09/29/20 1800 118/54 98.6  F (37  C) Oral 95 -- 100 % -- --   09/29/20 1745 115/54 98.7  F (37.1  C) -- 95 20 99 % -- --   09/29/20 1730 116/59 -- -- 100 -- 99 % -- --   09/29/20 1715 108/55 -- -- 95 -- 100 % -- --   09/29/20 1700 -- -- -- -- -- 99 % -- --   09/29/20 1645 122/59 -- -- -- -- 99 % -- --   09/29/20 1609 111/48 96.6  F (35.9  C) Temporal 109 18 99 % 1.727 m (5' 8\") 83.5 kg (184 lb)        Physical Exam  General: Well-developed and well-nourished. Well appearing middle aged  man. Cooperative.  Head:  Atraumatic.  Eyes:  Conjunctivae, lids, and sclerae are normal.  Neck:  Supple. Normal range of motion.  CV:  Regular rate and rhythm. Normal heart sounds with no murmurs, rubs, or gallops detected.  Resp:  No respiratory distress. Clear to auscultation bilaterally without decreased breath sounds, wheezing, rales, or rhonchi.  GI:  Soft. Non-distended. Non-tender.   Rectal: MONISHA with return of melanotic stool without brennen blood.  MS:  Normal ROM.   Skin:  Warm. Non-diaphoretic. Pale.  Neuro:  Awake. A&Ox3. Normal strength.  Psych: Normal mood and affect. Normal speech.  Vitals reviewed.    Emergency Department Course     Laboratory:  Laboratory findings were communicated with the patient who " "voiced understanding of the findings.    CBC: WBC: 5.6, HGB: 6.8 (LL), PLT: 234  BMP: Glucose 392 (H) , Urea Nitrogen: 60 (H) , GFR: 42 (L), Calcium: 8.4 (L) , o/w WNL (Creatinine: 1.64 (H))  Hepatic Panel: Albumin: 3.1 (L), Protein Total: 6.6 (L), o/w Negative  INR: 1.20 (H)   PTT: 28  Occult Blood Stool: Positive (A)     ABO: O Rh: Negative AntiB: Negative     Interventions:   1708 NS 1000 mL IV       Emergency Department Course:  1622 IV was inserted and blood was drawn for laboratory testing, results above.    1636 Nursing notes and vitals reviewed.    1645 I performed an exam of the patient as documented above.     1658 I performed a rectal exam of the patient as documented above.      1659 Consent for blood transfusion obtained.     1704 The patient provided a stool sample for laboratory testing as documented above.     1715 I consulted with Dr. Osman of the hospitalist service. He is in agreement to accept the patient for admission. Findings and plan explained to the patient who consents to admission. Discussed the patient with Dr. Osman who will admit the patient to a medical bed for further monitoring, evaluation, and treatment.    Impression & Plan     Medical Decision Making:  Los is a 70-year-old man who has had early fatigue with exertion for the last couple of days with general weakness.  He has been having dark brown diarrheal stools though none yesterday.  This morning he had a \"magenta\" appearing stool with persistent dizziness and weaknewss which prompted his visit.  He denies all other concerns or complaints and appears well, though pale, on exam.  Fortunately, he is hemodynamically stable.  His stool is melanotic and Hemoccult positive.  He does have acute anemia to 6.8 from last value in the 12 range in May 2019.  Given this finding, as well as his symptoms and evidence he likely has ongoing bleeding given melanotic stool today, I gave him 1 unit of PRBCs after written consent was " obtained.  His INR is mildly elevated at 1.2 although the remainder of his laboratory studies are unremarkable with baseline creatinine of 1.64.  BUN of 60 supports an upper GI bleed and incidental hyperglycemia to 392 without evidence of DKA is noted. He was given IV fluids. Clearly this man requires admission for blood transfusion and close monitoring with GI consultation.  He has had prior upper GI bleeds related to history of gastric bypass surgery and I suspect he would benefit from endoscopy.  I discussed findings and plan with Los and answered all his questions.  He verbalized understanding and is amenable.  I discussed the patient's case with Dr. Osman, hospitalist, who accepts admission and has no further orders.  He is okay with Protonix being administered on the floor rather than being initiated in the emergency department.    Covid-19  Los Renee was evaluated during a global COVID-19 pandemic, which necessitated consideration that the patient might be at risk for infection with the SARS-CoV-2 virus that causes COVID-19.   Applicable protocols for evaluation were followed during the patient's care.   COVID-19 was considered as part of the patient's evaluation. The plan for testing is:  a test was obtained during this visit.    Diagnosis:     ICD-10-CM    1. Upper GI bleed  K92.2    2. Anemia due to blood loss, acute  D62    3. Type 2 diabetes mellitus with hyperglycemia, without long-term current use of insulin (H)  E11.65         Disposition:  Admitted to Dr. Osman.    Scribe Disclosure:  I, Murphy Johnson, am serving as a scribe at 4:39 PM on 9/29/2020 to document services personally performed by Brenda Conti MD based on my observations and the provider's statements to me.     Brenda Grissom MD  09/29/20 [id number]

## 2020-09-29 NOTE — ED NOTES
"[facility]  ED Nurse Handoff Report    ED Chief complaint: Melena and Dizziness      ED Diagnosis:   Final diagnoses:   None       Code Status: To be addressed by admitting MD.     Allergies:   Allergies   Allergen Reactions     Simvastatin Nausea       Patient Story: Pt presents to ED for blood in stool, dizziness, and fatigue.   Focused Assessment:  Hemoglobin of 6.8 in ED. Pt is tachycardic in ED. BP stable 100/50s. Pt is alert and oriented x4. Blood started in ED.     Treatments and/or interventions provided: Blood transfusion   Patient's response to treatments and/or interventions:     To be done/followed up on inpatient unit:  Monitor, inpatient orders, transfuse RBC     Does this patient have any cognitive concerns?: NA    Activity level - Baseline/Home:  Independent  Activity Level - Current:   Independent    Patient's Preferred language: English   Needed?: No    Isolation: None  Infection: Not Applicable  Bariatric?: No    Vital Signs:   Vitals:    09/29/20 1609 09/29/20 1645 09/29/20 1700 09/29/20 1715   BP: 111/48 122/59     Pulse: 109      Resp: 18      Temp: 96.6  F (35.9  C)      TempSrc: Temporal      SpO2: 99% 99% 99% 100%   Weight: 83.5 kg (184 lb)      Height: 1.727 m (5' 8\")          Cardiac Rhythm:     Was the PSS-3 completed:   Yes  What interventions are required if any?               Family Comments: Wife at bedside in ED.   OBS brochure/video discussed/provided to patient/family: NA              Name of person given brochure if not patient: NA              Relationship to patient: na     For the majority of the shift this patient's behavior was Green.   Behavioral interventions performed were Frequent rounding.    ED NURSE PHONE NUMBER: [id number]           "

## 2020-09-29 NOTE — H&P
[facility]    History and Physical - Hospitalist Service       Date of Admission:  9/29/2020    Assessment & Plan   Los Renee is a 70 year old male admitted on 9/29/2020.  Past history of diabetes mellitus type 2, asthma, gastric bypass with bleeding anastomotic ulcer, nephrolithiasis who presents with generalized weakness with onset of maroon stool on day of admission.    Probable upper GI bleed with acute blood loss anemia  Hx GIB due to anastomotic ulcer 11/2012  Presents with generalized weakness, lightheadedness and easy fatigability with onset of maroon stool on 9/29.  Admission hemoglobin 6.8, down from 12.8 in May 2019.  Denies any prior melena or hematochezia.  BUN elevated at 60 consistent with upper GI bleed.  No NSAID, tobacco or alcohol use.  -Receiving 1 unit PRBC in ED  -Hemoglobin every 6 hours  -Conditional transfusion for hemoglobin less than 7  -Start PPI drip  -check coags  -Consult [place] [facility]  -N.p.o. midnight for anticipated EGD tomorrow, start maintenance fluids at that time  -Clear liquid diet  -asymptomatic covid pending, low concern    Diabetes mellitus type 2, uncontrolled  Hemoglobin A1c 9.1% in August 2020.  Reports metformin was recently discontinued and glimepiride was increased per his primary care provider earlier this week.  -Prandial insulin 1 unit per 10 g carb 3 times daily with meals  -High-dose sliding scale insulin  -Close follow-up with primary care provider  -Currently normotensive, will hold lisinopril in setting of GI bleed    Hyperlipidemia  -Continue prior to admission simvastatin once verified    Bilateral nephrolithiasis  Currently follows with urology and has plan for surgical intervention in the coming month.  -Resume prior to admission Flomax once verified    Elevated creatinine  Creatinine in August 2020 was 2.2, this has slowly trended down and may be stress related to his nephrolithiasis.  Prior to this, his creatinine  ranged 1.1-1.4.  Admission creatinine 1.64.  -Monitor daily  -Holding lisinopril as above         Diet: Clear liquid diet  DVT Prophylaxis: Pneumatic Compression Devices  Gilmore Catheter: not present  Code Status: DNR/DNI per patient         Disposition Plan   Expected discharge: 2 - 3 days, recommended to prior living arrangement once Hemoglobin stable, cleared by gastroenterology.  Entered: Miguel Osman MD 09/29/2020, 5:44 PM     The patient's care was discussed with the Patient and Patient's Family.    Miguel Osman MD  [facility]    ______________________________________________________________________    Chief Complaint   Maroon stool    History is obtained from the patient, his wife who is at bedside, chart review in addition to discussion with emergency room provider    History of Present Illness   Los Renee is a 70 year old male who presents with several days of generalized weakness, easy fatigability, mild lightheadedness with onset of maroon stool on day of admission.  Reports a single episode of lower abdominal pain today but this was associated with passing a large amount of gas and otherwise denies any abdominal pain.  He denies any nausea or abdominal distention.  He denies any shortness of breath or chest pain or pressure.  Reports he has had poor appetite over the past month which has resulted in decreased oral intake and an overall unintentional weight loss of 10 pounds.  He denies any NSAID, alcohol or tobacco use.  He denies any recent melena or other hematochezia.  He denies any symptoms to suggest COVID.  He reports a history of GI bleeding in November 2012 which was secondary to an anastomotic ulcer following his gastric bypass surgery.    Review of Systems    The 10 point Review of Systems is negative other than noted in the HPI or here.     Past Medical History    Diabetes mellitus type 2  Asthma  History of gastric bypass  Bilateral nephrolithiasis  History of upper GI  bleed secondary to anastomotic ulcer  Vitamin B6 deficiency  Vitamin B12 deficiency  Vitamin D deficiency    Chart also indicates these medical issues but patient denies these:  Hypertension  SOLITARIO  Hyperlipidemia  GERD    Past Surgical History   I have reviewed this patient's surgical history and updated it with pertinent information if needed.  Past Surgical History:   Procedure Laterality Date     COLONOSCOPY N/A 2018    Procedure: COMBINED COLONOSCOPY, SINGLE OR MULTIPLE BIOPSY/POLYPECTOMY BY BIOPSY;  colonoscopy;  Surgeon: Max Santizo MD;  Location:  GI     CYSTOSCOPY       EXTRACORPOREAL SHOCK WAVE LITHOTRIPSY, CYSTOSCOPY, INSERT STENT URETER(S), COMBINED Right 2015    Procedure: COMBINED EXTRACORPOREAL SHOCK WAVE LITHOTRIPSY, CYSTOSCOPY, INSERT STENT URETER(S);  Surgeon: Jeovanny Ricketts MD;  Location:  OR     KNEE SURGERY       LAPAROSCOPIC BYPASS GASTRIC       LASER HOLMIUM LITHOTRIPSY URETER(S), INSERT STENT, COMBINED Right 11/3/2016    Procedure: COMBINED CYSTOSCOPY, URETEROSCOPY, LASER HOLMIUM LITHOTRIPSY URETER(S), INSERT STENT;  Surgeon: Neli Freitas MD;  Location:  OR     ORTHOPEDIC SURGERY      bilat knee replaCEMENTS     VASECTOMY         Social History   He denies any tobacco or alcohol use.  He resides at home with his wife.    Family History   I have reviewed this patient's family history and updated it with pertinent information if needed.  Family History   Problem Relation Age of Onset     C.A.D. Mother      Diabetes Mother      Breast Cancer Mother      Cerebrovascular Disease Mother      Alcohol/Drug Mother      Eye Disorder Mother      Heart Disease Mother      C.A.D. Father      Diabetes Father      Alcohol/Drug Father      Leukemia Father        Prior to Admission Medications   Prior to Admission Medications   Prescriptions Last Dose Informant Patient Reported? Taking?   Alcohol Swabs (B-D SINGLE USE SWABS REGULAR) PADS   No No   Si pad daily    Patient not taking: Reported on 2020   Calcium Carbonate-Vitamin D (CALCIUM 500 + D PO)   Yes No   Cholecalciferol (VITAMIN D) 2000 units tablet   No No   Sig: Take 2,000 Units by mouth daily   Cyanocobalamin (B-12 SUPER STRENGTH) 5000 MCG/ML LIQD   No No   Sig: Place 0.5 mLs under the tongue every other day FOR VITAMIN B12 SUPPLEMENTATION, PLEASE PLACE UNDER THE TONGUE   Ferrous Sulfate (IRON PO)   Yes No   Lactobacillus (PROBIOTIC ACIDOPHILUS PO)   Yes No   blood glucose (ACCU-CHEK UCHE PLUS) test strip   No No   Si strip by In Vitro route daily   blood glucose calibration (ACCU-CHEK UCHE) solution   No No   Sig: Use to calibrate blood glucose monitor as needed as directed.   Patient not taking: Reported on 2020   blood glucose monitoring (ACCU-CHEK UCHE PLUS) meter device kit   No No   Sig: TEST BLOOD SUGAR ONE TIME DAILY  OR AS DIRECTED   calcium-vitamin D (CALCIUM 600 + D) 600-400 MG-UNIT per tablet   No No   Sig: Take 1 tablet by mouth daily   glimepiride (AMARYL) 1 MG tablet   No No   Sig: Take 2 tablets (2 mg) by mouth every morning (before breakfast)   lisinopril (ZESTRIL) 20 MG tablet   No No   Sig: Take 1 tablet (20 mg) by mouth daily   multivitamin w/minerals (THERA-VIT-M) tablet   Yes No   Sig: Take 1 tablet by mouth daily   potassium citrate (UROCIT-K) 10 MEQ (1080 MG) CR tablet   No No   Sig: TAKE 1 TABLET BY MOUTH TWICE A DAY   simvastatin (ZOCOR) 20 MG tablet   No No   Sig: Take 1 tablet (20 mg) by mouth At Bedtime   tamsulosin (FLOMAX) 0.4 MG capsule   No No   Sig: Take 1 capsule (0.4 mg) by mouth daily      Facility-Administered Medications: None     Allergies   Allergies   Allergen Reactions     Simvastatin Nausea       Physical Exam   Vital Signs: Temp: 96.6  F (35.9  C) Temp src: Temporal BP: 108/55 Pulse: 95   Resp: 18 SpO2: 99 %      Weight: 184 lbs 0 oz    General Appearance: Well-nourished male no acute distress  Eyes: Pupils equal, round reactive to light, sclera  anicteric  HEENT: Mucous membranes moist, no neck adenopathy  Respiratory: Lungs clear to auscultation bilaterally, no wheeze or crackles, no tachypnea  Cardiovascular: Regular rhythm, normal S1/S2, no murmurs, rubs or gallops  GI: Abdomen soft, nontender, nondistended, normal bowel sounds  Lymph/Hematologic: No peripheral edema  Musculoskeletal: Extremities warm and well-perfused  Neurologic: Alert and appropriate, cranial nerves grossly intact  Psychiatric: Normal affect    Data   Data reviewed today: I reviewed all medications, new labs and imaging results over the last 24 hours. I personally reviewed no images or EKG's today.    Recent Labs   Lab 09/29/20  1622   WBC 5.6   HGB 6.8*   MCV 90      INR 1.20*      POTASSIUM 4.9   CHLORIDE 107   CO2 20   BUN 60*   CR 1.64*   ANIONGAP 6   CIARRA 8.4*   *   ALBUMIN 3.1*   PROTTOTAL 6.6*   BILITOTAL 0.3   ALKPHOS 77   ALT 24   AST 12

## 2020-09-29 NOTE — TELEPHONE ENCOUNTER
"    Additional Information    Negative: Passed out (i.e., fainted, collapsed and was not responding)    Negative: Shock suspected (e.g., cold/pale/clammy skin, too weak to stand, low BP, rapid pulse)    Negative: Vomiting red blood or black (coffee ground) material    Negative: Sounds like a life-threatening emergency to the triager    Negative: Diarrhea is the main symptom    Negative: Rectal symptoms    Negative: SEVERE rectal bleeding (large blood clots; on and off, or constant bleeding)    Negative: SEVERE dizziness (e.g., unable to stand, requires support to walk, feels like passing out now)    Negative: MODERATE rectal bleeding (small blood clots, passing blood without stool, or toilet water turns red) more than once a day    Negative: Bloody, black, or tarry bowel movements    Negative: High-risk adult (e.g., prior surgery on aorta, abdominal aortic aneurysm)    Negative: Rectal foreign body (inserted or swallowed)    Negative: SEVERE abdominal pain (e.g., excruciating)    Negative: Constant abdominal pain lasting > 2 hours    Negative: Pale skin (pallor) of new onset or worsening    Negative: Patient sounds very sick or weak to the triager    MODERATE rectal bleeding (small blood clots, passing blood without stool, or toilet water turns red)    Negative: Taking Coumadin (warfarin) or other strong blood thinner, or known bleeding disorder (e.g., thrombocytopenia)    Negative: Colonoscopy in past 72 hours    Negative: Known cirrhosis of the liver (or history of liver failure or ascites)    Answer Assessment - Initial Assessment Questions  1. APPEARANCE of BLOOD: \"What color is it?\" \"Is it passed separately, on the surface of the stool, or mixed in with the stool?\"       Blood noticed in the water. 'Unsure if it was mixed but did have a feeling it was like last time when he had a GI bleed'.   2. AMOUNT: \"How much blood was passed?\"       About a dollar coin size on toilet paper  3. FREQUENCY: \"How many times " "has blood been passed with the stools?\"       1x today  4. ONSET: \"When was the blood first seen in the stools?\" (Days or weeks)       4 hours ago today  5. DIARRHEA: \"Is there also some diarrhea?\" If so, ask: \"How many diarrhea stools were passed in past 24 hours?\"       Stool was tarry like, first was a black brown. Now maroon in color  6. CONSTIPATION: \"Do you have constipation?\" If so, \"How bad is it?\"      None  7. RECURRENT SYMPTOMS: \"Have you had blood in your stools before?\" If so, ask: \"When was the last time?\" and \"What happened that time?\"       Yes, hx of internal bleeding-2012 per chart review  8. BLOOD THINNERS: \"Do you take any blood thinners?\" (e.g., Coumadin/warfarin, Pradaxa/dabigatran, aspirin)      None. Hx of gastric bypass. No recent NSAID's. No alcohol intake.  9. OTHER SYMPTOMS: \"Do you have any other symptoms?\"  (e.g., abdominal pain, vomiting, dizziness, fever)      No pain with bowel movement. No abdominal pain today but did feel in the past stomach felt queasy but not at this moment and not during or after having tarry like stool today. No vomiting. Dizzy occasionally especially if he gets up from a chair and starts walking. Feels good right now but if 'he' got up expressed he will feel very fatigued.  10. PREGNANCY: \"Is there any chance you are pregnant?\" \"When was your last menstrual period?\"        n/a    Protocols used: RECTAL BLEEDING-A-[place]      "

## 2020-09-30 LAB
ANION GAP SERPL CALCULATED.3IONS-SCNC: 5 MMOL/L (ref 3–14)
BLD PROD TYP BPU: NORMAL
BLD UNIT ID BPU: 0
BLOOD PRODUCT CODE: NORMAL
BPU ID: NORMAL
BUN SERPL-MCNC: 42 MG/DL (ref 7–30)
CALCIUM SERPL-MCNC: 8.1 MG/DL (ref 8.5–10.1)
CHLORIDE SERPL-SCNC: 110 MMOL/L (ref 94–109)
CO2 SERPL-SCNC: 21 MMOL/L (ref 20–32)
CREAT SERPL-MCNC: 1.45 MG/DL (ref 0.66–1.25)
GFR SERPL CREATININE-BSD FRML MDRD: 49 ML/MIN/{1.73_M2}
GLUCOSE BLDC GLUCOMTR-MCNC: 177 MG/DL (ref 70–99)
GLUCOSE BLDC GLUCOMTR-MCNC: 216 MG/DL (ref 70–99)
GLUCOSE BLDC GLUCOMTR-MCNC: 245 MG/DL (ref 70–99)
GLUCOSE BLDC GLUCOMTR-MCNC: 265 MG/DL (ref 70–99)
GLUCOSE BLDC GLUCOMTR-MCNC: 300 MG/DL (ref 70–99)
GLUCOSE SERPL-MCNC: 273 MG/DL (ref 70–99)
HGB BLD-MCNC: 6.6 G/DL (ref 13.3–17.7)
HGB BLD-MCNC: 6.7 G/DL (ref 13.3–17.7)
HGB BLD-MCNC: 7.6 G/DL (ref 13.3–17.7)
HGB BLD-MCNC: 7.6 G/DL (ref 13.3–17.7)
HGB BLD-MCNC: 8.1 G/DL (ref 13.3–17.7)
POTASSIUM SERPL-SCNC: 4.6 MMOL/L (ref 3.4–5.3)
SODIUM SERPL-SCNC: 136 MMOL/L (ref 133–144)
TRANSFUSION STATUS PATIENT QL: NORMAL
TRANSFUSION STATUS PATIENT QL: NORMAL
UPPER GI ENDOSCOPY: NORMAL

## 2020-09-30 PROCEDURE — 36415 COLL VENOUS BLD VENIPUNCTURE: CPT | Performed by: HOSPITALIST

## 2020-09-30 PROCEDURE — 85018 HEMOGLOBIN: CPT | Performed by: HOSPITALIST

## 2020-09-30 PROCEDURE — 25800030 ZZH RX IP 258 OP 636: Performed by: HOSPITALIST

## 2020-09-30 PROCEDURE — 25000128 H RX IP 250 OP 636: Performed by: HOSPITALIST

## 2020-09-30 PROCEDURE — 88341 IMHCHEM/IMCYTCHM EA ADD ANTB: CPT | Mod: 26 | Performed by: INTERNAL MEDICINE

## 2020-09-30 PROCEDURE — 40000104 ZZH STATISTIC MODERATE SEDATION < 10 MIN: Performed by: INTERNAL MEDICINE

## 2020-09-30 PROCEDURE — 0DB68ZX EXCISION OF STOMACH, VIA NATURAL OR ARTIFICIAL OPENING ENDOSCOPIC, DIAGNOSTIC: ICD-10-PCS | Performed by: INTERNAL MEDICINE

## 2020-09-30 PROCEDURE — 88305 TISSUE EXAM BY PATHOLOGIST: CPT | Mod: 26 | Performed by: INTERNAL MEDICINE

## 2020-09-30 PROCEDURE — 25000125 ZZHC RX 250: Performed by: INTERNAL MEDICINE

## 2020-09-30 PROCEDURE — 88341 IMHCHEM/IMCYTCHM EA ADD ANTB: CPT | Performed by: INTERNAL MEDICINE

## 2020-09-30 PROCEDURE — 99232 SBSQ HOSP IP/OBS MODERATE 35: CPT | Performed by: HOSPITALIST

## 2020-09-30 PROCEDURE — P9016 RBC LEUKOCYTES REDUCED: HCPCS | Performed by: EMERGENCY MEDICINE

## 2020-09-30 PROCEDURE — 88305 TISSUE EXAM BY PATHOLOGIST: CPT | Performed by: INTERNAL MEDICINE

## 2020-09-30 PROCEDURE — 00000146 ZZHCL STATISTIC GLUCOSE BY METER IP

## 2020-09-30 PROCEDURE — 25000128 H RX IP 250 OP 636: Performed by: INTERNAL MEDICINE

## 2020-09-30 PROCEDURE — 88342 IMHCHEM/IMCYTCHM 1ST ANTB: CPT | Performed by: INTERNAL MEDICINE

## 2020-09-30 PROCEDURE — 25000132 ZZH RX MED GY IP 250 OP 250 PS 637: Performed by: HOSPITALIST

## 2020-09-30 PROCEDURE — 25000131 ZZH RX MED GY IP 250 OP 636 PS 637: Performed by: HOSPITALIST

## 2020-09-30 PROCEDURE — 80048 BASIC METABOLIC PNL TOTAL CA: CPT | Performed by: HOSPITALIST

## 2020-09-30 PROCEDURE — 12000000 ZZH R&B MED SURG/OB

## 2020-09-30 PROCEDURE — 43239 EGD BIOPSY SINGLE/MULTIPLE: CPT | Performed by: INTERNAL MEDICINE

## 2020-09-30 PROCEDURE — C9113 INJ PANTOPRAZOLE SODIUM, VIA: HCPCS | Performed by: HOSPITALIST

## 2020-09-30 PROCEDURE — 88342 IMHCHEM/IMCYTCHM 1ST ANTB: CPT | Mod: 26 | Performed by: INTERNAL MEDICINE

## 2020-09-30 RX ORDER — FENTANYL CITRATE 50 UG/ML
INJECTION, SOLUTION INTRAMUSCULAR; INTRAVENOUS PRN
Status: DISCONTINUED | OUTPATIENT
Start: 2020-09-30 | End: 2020-09-30 | Stop reason: HOSPADM

## 2020-09-30 RX ORDER — LIDOCAINE 40 MG/G
CREAM TOPICAL
Status: DISCONTINUED | OUTPATIENT
Start: 2020-09-30 | End: 2020-09-30 | Stop reason: HOSPADM

## 2020-09-30 RX ADMIN — POTASSIUM CITRATE 10 MEQ: 5 TABLET, EXTENDED RELEASE ORAL at 20:34

## 2020-09-30 RX ADMIN — SODIUM CHLORIDE 8 MG/HR: 9 INJECTION, SOLUTION INTRAVENOUS at 23:12

## 2020-09-30 RX ADMIN — SIMVASTATIN 20 MG: 20 TABLET, FILM COATED ORAL at 01:11

## 2020-09-30 RX ADMIN — POTASSIUM CITRATE 10 MEQ: 5 TABLET, EXTENDED RELEASE ORAL at 10:01

## 2020-09-30 RX ADMIN — POTASSIUM CITRATE 10 MEQ: 5 TABLET, EXTENDED RELEASE ORAL at 01:12

## 2020-09-30 RX ADMIN — TAMSULOSIN HYDROCHLORIDE 0.4 MG: 0.4 CAPSULE ORAL at 10:01

## 2020-09-30 RX ADMIN — INSULIN ASPART 5 UNITS: 100 INJECTION, SOLUTION INTRAVENOUS; SUBCUTANEOUS at 12:34

## 2020-09-30 RX ADMIN — INSULIN ASPART 6 UNITS: 100 INJECTION, SOLUTION INTRAVENOUS; SUBCUTANEOUS at 19:00

## 2020-09-30 RX ADMIN — SIMVASTATIN 20 MG: 20 TABLET, FILM COATED ORAL at 20:36

## 2020-09-30 RX ADMIN — SODIUM CHLORIDE: 9 INJECTION, SOLUTION INTRAVENOUS at 00:00

## 2020-09-30 RX ADMIN — SODIUM CHLORIDE: 9 INJECTION, SOLUTION INTRAVENOUS at 04:27

## 2020-09-30 RX ADMIN — SODIUM CHLORIDE 8 MG/HR: 9 INJECTION, SOLUTION INTRAVENOUS at 11:36

## 2020-09-30 ASSESSMENT — ACTIVITIES OF DAILY LIVING (ADL)
ADLS_ACUITY_SCORE: 14

## 2020-09-30 NOTE — CONSULTS
[facility]  Gastroenterology Consultation    Los Renee  [address] [address]  70 year old male    Admission Date/Time: 9/29/2020  Primary Care Provider: Art Cheung    We were asked to see the patient in consultation by Dr. Osman for evaluation of ? GI bleed.        HPI:  Los Renee is a 70 year old male who has a past medical history of type 2 DM, asthma, gastric bypass with bleeding at anastomotic ulcer, nephrolithiasis who presents with maroon stool x 1 day and generalized weakness.    Hemoglobin on admission 6.8, down from 12.8 in 5/2019.  No prior melena or hematochezia.  BUN elevated at 60.  No NSAID use.      EGD 11/2012 showed a normal esophagus, Latanya-en-Y gastrojejunostomy with bleeding from anastomotic ulcer.  Colonoscopy 7/2018 showed one 5 mm polyp in the rectum (tubular adenoma) and sigmoid and descending colonic diverticulosis.      ROS: A comprehensive ten point review of systems was negative aside from those in mentioned in the HPI.      MEDICATIONS: No current facility-administered medications on file prior to encounter.   calcium-vitamin D (CALCIUM 600 + D) 600-400 MG-UNIT per tablet, Take 1 tablet by mouth daily  Cholecalciferol (VITAMIN D) 2000 units tablet, Take 2,000 Units by mouth daily  Cyanocobalamin (B-12 SUPER STRENGTH) 5000 MCG/ML LIQD, Place 0.5 mLs under the tongue every other day FOR VITAMIN B12 SUPPLEMENTATION, PLEASE PLACE UNDER THE TONGUE  ferrous sulfate (FEROSUL) 325 (65 Fe) MG tablet, Take 325 mg by mouth daily (with breakfast)  glimepiride (AMARYL) 2 MG tablet, Take 1 mg by mouth 2 times daily (with meals)  lisinopril (ZESTRIL) 20 MG tablet, Take 1 tablet (20 mg) by mouth daily  multivitamin w/minerals (THERA-VIT-M) tablet, Take 1 tablet by mouth daily  potassium citrate (UROCIT-K) 10 MEQ (1080 MG) CR tablet, TAKE 1 TABLET BY MOUTH TWICE A DAY  simvastatin (ZOCOR) 20 MG tablet, Take 1 tablet (20 mg) by  mouth At Bedtime  tamsulosin (FLOMAX) 0.4 MG capsule, Take 1 capsule (0.4 mg) by mouth daily  blood glucose (ACCU-CHEK UCHE PLUS) test strip, 1 strip by In Vitro route daily  blood glucose monitoring (ACCU-CHEK UCHE PLUS) meter device kit, TEST BLOOD SUGAR ONE TIME DAILY  OR AS DIRECTED  Lactobacillus (PROBIOTIC ACIDOPHILUS) CAPS, Take by mouth daily         ALLERGIES:   Allergies   Allergen Reactions     Simvastatin Nausea       Past Medical History:   Diagnosis Date     Arthritis      Gastric bypass status for obesity      Gastro-oesophageal reflux disease      GI bleed 11/1/2012     Intermittent asthma     related to allergies- rare symptoms     Mixed hyperlipidemia 1/7/2008     Morbid obesity (H) 01/24/2008    s/p gastric bypass surgery     Morbid obesity (H) 1/24/2008     Mumps     in the early 60's     Nephrolithiasis      SOLITARIO (obstructive sleep apnea) 2010          Other chronic pain     chronic left knee pain     Spider veins      Swelling of testicle      Type 2 diabetes mellitus (H) 01/07/2008     Unspecified essential hypertension 1/7/2008    resolved since gastric surgery       Past Surgical History:   Procedure Laterality Date     COLONOSCOPY N/A 7/5/2018    Procedure: COMBINED COLONOSCOPY, SINGLE OR MULTIPLE BIOPSY/POLYPECTOMY BY BIOPSY;  colonoscopy;  Surgeon: Max Santizo MD;  Location:  GI     CYSTOSCOPY       EXTRACORPOREAL SHOCK WAVE LITHOTRIPSY, CYSTOSCOPY, INSERT STENT URETER(S), COMBINED Right 6/1/2015    Procedure: COMBINED EXTRACORPOREAL SHOCK WAVE LITHOTRIPSY, CYSTOSCOPY, INSERT STENT URETER(S);  Surgeon: Jeovanny Ricketts MD;  Location:  OR     KNEE SURGERY       LAPAROSCOPIC BYPASS GASTRIC       LASER HOLMIUM LITHOTRIPSY URETER(S), INSERT STENT, COMBINED Right 11/3/2016    Procedure: COMBINED CYSTOSCOPY, URETEROSCOPY, LASER HOLMIUM LITHOTRIPSY URETER(S), INSERT STENT;  Surgeon: Neli Freitas MD;  Location:  OR     ORTHOPEDIC SURGERY      bilat knee  "replaCEMENTS     VASECTOMY           SOCIAL HISTORY:  Social History     Tobacco Use     Smoking status: Never Smoker     Smokeless tobacco: Never Used   Substance Use Topics     Alcohol use: No     Alcohol/week: 0.0 standard drinks     Drug use: No       FAMILY HISTORY:  Family History   Problem Relation Age of Onset     C.A.D. Mother      Diabetes Mother      Breast Cancer Mother      Cerebrovascular Disease Mother      Alcohol/Drug Mother      Eye Disorder Mother      Heart Disease Mother      C.A.D. Father      Diabetes Father      Alcohol/Drug Father      Leukemia Father        PHYSICAL EXAM:   /59   Pulse 77   Temp 98  F (36.7  C) (Oral)   Resp 16   Ht 1.727 m (5' 8\")   Wt 84.7 kg (186 lb 12.8 oz)   SpO2 99%   BMI 28.40 kg/m      Constitutional: NAD, comfortable  Cardiovascular: RRR, normal S1 and S2, no r/c/g/m  Respiratory: CTAB  Psychiatric: mentation appears normal and affect normal  Head: Normocephalic. Atraumatic.    Neck: Neck supple. No adenopathy. Thyroid symmetric, normal size, trachea midline  Eyes:  PERRL, no icterus  ENT: Hearing adequate, pharynx normal without erythema or exudate  Abdomen:   Auscultation: + BS  Appearance: non-distended  Palpation: non-tender  NEURO: grossly negative  SKIN: no suspicious lesions or rashes  LYMPH:   anterior cervical: no adenopathy  posterior cervical: no adenopathy  supraclavicular: no adenopathy          ADDITIONAL COMMENTS:   I reviewed the patient's new clinical lab test results.   Recent Labs   Lab Test 09/29/20  2355 09/29/20 2017 09/29/20  1622 05/17/19  0819  08/12/17  0918 01/31/17  1829  11/01/12  0921   WBC  --   --  5.6  --   --  5.9 5.5   < > 8.1   HGB 6.7*  --  6.8* 12.8*   < > 13.6 13.0*   < > 10.0*   MCV  --   --  90  --   --  96 89   < > 83   PLT  --   --  234  --   --  153 209   < > 202   INR  --  1.20* 1.20*  --   --   --   --   --  1.12    < > = values in this interval not displayed.     Recent Labs   Lab Test 09/29/20  1622 " 09/10/20  1218 08/17/20  0750    136 132*   POTASSIUM 4.9 4.7 4.3   CHLORIDE 107 105 100   CO2 20 24 26   BUN 60* 36* 22   CR 1.64* 1.82* 2.21*   ANIONGAP 6 7 6   CIARRA 8.4* 9.1 8.7   * 151* 249*     Recent Labs   Lab Test 09/29/20  1622 05/21/18  0753 08/12/17  0918 01/31/17  1829  10/11/16  1441 09/27/16  0813   ALBUMIN 3.1*  --  3.8 4.4   < >  --   --    BILITOTAL 0.3  --  0.6 0.4   < >  --   --    DBIL <0.1  --   --   --   --   --   --    ALT 24 38 55 29   < >  --   --    AST 12  --  33 14   < >  --   --    ALKPHOS 77  --  84 103   < >  --   --    PROTEIN  --   --   --   --   --  30* 100*    < > = values in this interval not displayed.       CONSULTATION ASSESSMENT AND PLAN:      71 yo male with history of Latanya-en-Y and bleeding anastomotic ulcer in 2012.  Now presents with maroon stool x 1 day and acute drop in hemoglobin.  Elevated BUN consistent with upper GI bleeding.    -  Monitor H/H; transfuse prn.  -  Protonix gtt.  -  EGD today.  -  Keep npo.    I discussed the patient's findings and plan with Dr. Wheeler.        Angie Isaac, PAC  [facility]  Office:  [phone] call if needed after 5PM  Cell:  [phone], not available after 5PM at this number    GI ATTENDING NOTE    Patient seen and examined . Agree with above.  Patient with one episode of maroon stool, weakness and decreased hemoglobin.    Af VSS  Abdomen soft and non tender    A/P  Decreased hemoglobin and evidence of GI bleed  With H/o anastomotic ulcer, acid peptic disease is the most likely etiology  PPI and EGD today    Neda Wheeler MD  [facility]  Office

## 2020-09-30 NOTE — PLAN OF CARE
DATE & TIME: 2020 8689-4279    Cognitive Concerns/ Orientation : Alert/oriented x 4   BEHAVIOR & AGGRESSION TOOL COLOR: Green   ABNL VS/O2: VSS, room air  MOBILITY: SBA, refuses bed/chair alarm, calls appropriately   PAIN MANAGMENT: Denies this shift  DIET: NPO  BOWEL/BLADDER: Using urinal at bedside & up to the bathroom  ABNL LAB/BG: Positive for occult blood in stool. Hgb 6.7 transfused 1 unit overnight, recheck 8.1, next recheck 7.6. Hgb Q6 checks.  & 245.  DRAIN/DEVICES: 2 L PIV infusin protonix drip, other normal saline at 100 mL/hour.  SKIN: intact  TESTS/PROCEDURES: EGD on  at 3pm.   D/C DAY/GOALS/PLACE: Pending EGD and hgb stability per MD note.  OTHER IMPORTANT INFO: GI following  MD/RN ROUNDING SIGNED OFF D/E SHIFT: Yes  COMMIT TO SIT DONE AND SIGNED OFF Yes

## 2020-09-30 NOTE — PLAN OF CARE
Summary:     DATE & TIME: 9/29/2020 4380-4000   Cognitive Concerns/ Orientation : A&O x4   BEHAVIOR & AGGRESSION TOOL COLOR: Green  CIWA SCORE: NA   ABNL VS/O2: VSS on RA;   MOBILITY: SBA  PAIN MANAGMENT: denies  DIET: clear liquid- Npo at midnight.   BOWEL/BLADDER: cont.- using bathroom. No BM during shift.    ABNL LAB/BG: Hgb 6.8 Q6H checks.    DRAIN/DEVICES: PIV L ARM infusing Ns@100. Protonix also infusing.   TELEMETRY RHYTHM: NA  SKIN: intact  TESTS/PROCEDURES: EGD tomorrow.   D/C DAY/GOALS/PLACE: pulmonology following. Nursing continued to encourage IS use.   OTHER IMPORTANT INFO: GI consulted. 1 unit of RBC's in the ED (transfuse orders Hgb <7.)

## 2020-09-30 NOTE — PROGRESS NOTES
GI     Will see this patient this morning and plan accordingly    Thank you  Neda Wheeler MD  [facility]  Office

## 2020-09-30 NOTE — PROGRESS NOTES
GI NOTE    See procedure report    A/   Gastric bypass with anastomotic ulcer clean based no visible vessel  Biopsy for H.pylori    P/  PPI daily   Monitor hemoglobin if stable can discharge  Repeat EGD in 2 months to check healing    Neda Wheeler MD  [facility]  Office

## 2020-09-30 NOTE — PROGRESS NOTES
DATE & TIME: 2020 Night    Cognitive Concerns/ Orientation : Alert/oriented x 4   BEHAVIOR & AGGRESSION TOOL COLOR: Green   ABNL VS/O2: VSS, room air  MOBILITY: SBA  PAIN MANAGMENT: Denies this shift  DIET: NPO  BOWEL/BLADDER: Using urinal at bedside  ABNL LAB/BG: Positive for occult blood in stool, , Hgb 6.7 (1 unit transfusing)  DRAIN/DEVICES: 2 L PIV infusin blood, other normal saline at 100 mL/hour  SKIN: intact  TESTS/PROCEDURES: EGD on   D/C DAY/GOALS/PLACE: Pending  OTHER IMPORTANT INFO:  GI following  COMMIT TO SIT DONE AND SIGNED OFF YES

## 2020-09-30 NOTE — PROGRESS NOTES
[facility]    Medicine Progress Note - Hospitalist Service       Date of Admission:  9/29/2020  Assessment & Plan       Los Renee is a 70 year old male admitted on 9/29/2020.  Past history of diabetes mellitus type 2, asthma, gastric bypass with bleeding anastomotic ulcer, nephrolithiasis who presents with generalized weakness with onset of maroon stool on day of admission.     Probable upper GI bleed with acute blood loss anemia  Hx GIB due to anastomotic ulcer 11/2012  Presents with generalized weakness, lightheadedness and easy fatigability with onset of maroon stool on 9/29.  Admission hemoglobin 6.8, down from 12.8 in May 2019.  Denies any prior melena or hematochezia.  BUN elevated at 60 consistent with upper GI bleed.  No NSAID, tobacco or alcohol use.  - Received 1 unit PRBC in ED  - Hemoglobin every 6 hours  - Conditional transfusion for hemoglobin less than 7  - PPI drip continued and deferred to GI. IV BID dosing could be considered.   - Consult [facility]  - N.p.o. for EGD this afternoon  - asymptomatic covid NEGATIVE, low concern     Diabetes mellitus type 2, uncontrolled  Hemoglobin A1c 9.1% in August 2020.  Reports metformin was recently discontinued and glimepiride was increased per his primary care provider earlier this week.  - Prandial insulin 1 unit per 10 g carb 3 times daily with meals  - High-dose sliding scale insulin  - Close follow-up with primary care provider  - Currently normotensive, will hold lisinopril in setting of GI bleed     Hyperlipidemia  - Continue prior to admission simvastatin once verified     Bilateral nephrolithiasis  Currently follows with urology and has plan for surgical intervention in the coming month.  - Resume prior to admission Flomax once verified     Elevated creatinine  Creatinine in August 2020 was 2.2, this has slowly trended down and may be stress related to his nephrolithiasis.  Prior to this, his creatinine ranged  1.1-1.4.  Admission creatinine 1.64.  - Monitor daily - creat improved a bit to 1.4  - Holding lisinopril as above      Diet: NPO per Anesthesia Guidelines for Procedure/Surgery Except for: Meds, Ice Chips    DVT Prophylaxis: Pneumatic Compression Devices  Gilmore Catheter: not present  Code Status: No CPR- Do NOT Intubate           Disposition Plan   Expected discharge:1-2 days pending egd and hgb stability  Entered: Kole Gallardo MD 09/30/2020, 9:30 AM       The patient's care was discussed with the Bedside Nurse and Patient.    Kole Gallardo MD  Hospitalist Service  [facility]    ______________________________________________________________________    Interval History   Seen and examined this afternoon prior to egd. No new complaints. Denies sob, pain, abdo pain, n/v, sob. EGD upcoming. Los is understandably hungry. PPI continued.    Data reviewed today: I reviewed all medications, new labs and imaging results over the last 24 hours. I personally reviewed no images or EKG's today.    Physical Exam   Vital Signs: Temp: 98  F (36.7  C) Temp src: Oral BP: 110/59 Pulse: 77   Resp: 16 SpO2: 99 % O2 Device: None (Room air)    Weight: 186 lbs 12.8 oz    Gen: NAD, pleasant  HEENT: Normocephalic, EOMI, MMM  Resp: no crackles,  no wheezes, no increased work of resp  CV: S1S2 heard, reg rhythm, reg rate, no pedal edema  Abdo: soft, nontender, nondistended, bowel sounds present  Ext: calves nontender, well perfused  Neuro: AAOx3, CN grossly intact, no facial asymmetry      Data   Recent Labs   Lab 09/30/20  1236 09/30/20  0932 09/29/20  2355 09/29/20 2017 09/29/20  1622   WBC  --   --   --   --  5.6   HGB 7.6* 8.1* 6.7*  --  6.8*   MCV  --   --   --   --  90   PLT  --   --   --   --  234   INR  --   --   --  1.20* 1.20*   NA  --  136  --   --  133   POTASSIUM  --  4.6  --   --  4.9   CHLORIDE  --  110*  --   --  107   CO2  --  21  --   --  20   BUN  --  42*  --   --  60*   CR  --  1.45*  --   --   1.64*   ANIONGAP  --  5  --   --  6   CIARAR  --  8.1*  --   --  8.4*   GLC  --  273*  --   --  392*   ALBUMIN  --   --   --   --  3.1*   PROTTOTAL  --   --   --   --  6.6*   BILITOTAL  --   --   --   --  0.3   ALKPHOS  --   --   --   --  77   ALT  --   --   --   --  24   AST  --   --   --   --  12     No results found for this or any previous visit (from the past 24 hour(s)).

## 2020-09-30 NOTE — PROGRESS NOTES
Admission    Patient arrives to room 614-02 via cart from ED.  Care plan note: complete    Inpatient nursing criteria listed below were met:    PCD's Documented: Yes  Skin issues/needs documented :Yes  Isolation education started/completed NA  Patient allergies verified with patient: NA  Verified completion of [place] Risk Assessment Tool:  Yes  Verified completion of Guardianship screening tool: Yes  Fall Prevention: Care plan updated, Education given and documented Yes  Care Plan initiated: Yes  Home medications documented in belongings flowsheet: Yes  Patient belongings documented in belongings flowsheet: Yes  Reminder note (belongings/ medications) placed in discharge instructions:NA  Admission profile/ required documentation complete: Yes  Bedside Report Letter given and explained to patient Yes  Visitor Designated? Yes  If patient is a 72 hour hold/Commitment are belongings removed from room and locked up? NA

## 2020-10-01 VITALS
WEIGHT: 186.8 LBS | DIASTOLIC BLOOD PRESSURE: 60 MMHG | RESPIRATION RATE: 18 BRPM | HEIGHT: 68 IN | BODY MASS INDEX: 28.31 KG/M2 | OXYGEN SATURATION: 100 % | HEART RATE: 88 BPM | TEMPERATURE: 97.3 F | SYSTOLIC BLOOD PRESSURE: 122 MMHG

## 2020-10-01 LAB
ABO + RH BLD: NORMAL
ABO + RH BLD: NORMAL
ANION GAP SERPL CALCULATED.3IONS-SCNC: 2 MMOL/L (ref 3–14)
BLD GP AB SCN SERPL QL: NORMAL
BLD PROD TYP BPU: NORMAL
BLD PROD TYP BPU: NORMAL
BLD UNIT ID BPU: 0
BLOOD BANK CMNT PATIENT-IMP: NORMAL
BLOOD PRODUCT CODE: NORMAL
BPU ID: NORMAL
BUN SERPL-MCNC: 30 MG/DL (ref 7–30)
CALCIUM SERPL-MCNC: 8.2 MG/DL (ref 8.5–10.1)
CHLORIDE SERPL-SCNC: 112 MMOL/L (ref 94–109)
CO2 SERPL-SCNC: 24 MMOL/L (ref 20–32)
CREAT SERPL-MCNC: 1.37 MG/DL (ref 0.66–1.25)
GFR SERPL CREATININE-BSD FRML MDRD: 53 ML/MIN/{1.73_M2}
GLUCOSE BLDC GLUCOMTR-MCNC: 173 MG/DL (ref 70–99)
GLUCOSE BLDC GLUCOMTR-MCNC: 229 MG/DL (ref 70–99)
GLUCOSE BLDC GLUCOMTR-MCNC: 232 MG/DL (ref 70–99)
GLUCOSE SERPL-MCNC: 283 MG/DL (ref 70–99)
HGB BLD-MCNC: 8.6 G/DL (ref 13.3–17.7)
NUM BPU REQUESTED: 5
POTASSIUM SERPL-SCNC: 4.4 MMOL/L (ref 3.4–5.3)
SODIUM SERPL-SCNC: 138 MMOL/L (ref 133–144)
SPECIMEN EXP DATE BLD: NORMAL
TRANSFUSION STATUS PATIENT QL: NORMAL
TRANSFUSION STATUS PATIENT QL: NORMAL

## 2020-10-01 PROCEDURE — 250N000013 HC RX MED GY IP 250 OP 250 PS 637: Performed by: HOSPITALIST

## 2020-10-01 PROCEDURE — P9016 RBC LEUKOCYTES REDUCED: HCPCS | Performed by: EMERGENCY MEDICINE

## 2020-10-01 PROCEDURE — 999N001017 HC STATISTIC GLUCOSE BY METER IP

## 2020-10-01 PROCEDURE — 36415 COLL VENOUS BLD VENIPUNCTURE: CPT | Performed by: HOSPITALIST

## 2020-10-01 PROCEDURE — 99238 HOSP IP/OBS DSCHRG MGMT 30/<: CPT | Performed by: INTERNAL MEDICINE

## 2020-10-01 PROCEDURE — 80048 BASIC METABOLIC PNL TOTAL CA: CPT | Performed by: HOSPITALIST

## 2020-10-01 PROCEDURE — 85018 HEMOGLOBIN: CPT | Performed by: HOSPITALIST

## 2020-10-01 PROCEDURE — 250N000013 HC RX MED GY IP 250 OP 250 PS 637: Performed by: INTERNAL MEDICINE

## 2020-10-01 RX ORDER — PANTOPRAZOLE SODIUM 40 MG/1
40 TABLET, DELAYED RELEASE ORAL
Status: DISCONTINUED | OUTPATIENT
Start: 2020-10-01 | End: 2020-10-01 | Stop reason: HOSPADM

## 2020-10-01 RX ORDER — PANTOPRAZOLE SODIUM 40 MG/1
40 TABLET, DELAYED RELEASE ORAL
Qty: 30 TABLET | Refills: 1 | Status: ON HOLD | OUTPATIENT
Start: 2020-10-02 | End: 2020-12-26

## 2020-10-01 RX ADMIN — PANTOPRAZOLE SODIUM 40 MG: 40 TABLET, DELAYED RELEASE ORAL at 08:42

## 2020-10-01 RX ADMIN — INSULIN ASPART 4 UNITS: 100 INJECTION, SOLUTION INTRAVENOUS; SUBCUTANEOUS at 08:05

## 2020-10-01 RX ADMIN — INSULIN ASPART 4 UNITS: 100 INJECTION, SOLUTION INTRAVENOUS; SUBCUTANEOUS at 13:00

## 2020-10-01 RX ADMIN — TAMSULOSIN HYDROCHLORIDE 0.4 MG: 0.4 CAPSULE ORAL at 08:05

## 2020-10-01 RX ADMIN — POTASSIUM CITRATE 10 MEQ: 5 TABLET, EXTENDED RELEASE ORAL at 08:05

## 2020-10-01 ASSESSMENT — ACTIVITIES OF DAILY LIVING (ADL)
ADLS_ACUITY_SCORE: 14

## 2020-10-01 NOTE — PLAN OF CARE
DATE & TIME: 2020 [id number]   Cognitive Concerns/ Orientation : Alert/oriented x 4   BEHAVIOR & AGGRESSION TOOL COLOR: Green   ABNL VS/O2: VSS on RA  MOBILITY: SBA, steady gait, refuses bed/chair alarm, calls appropriately   PAIN MANAGMENT: Denies   DIET: Reg, tolerated well  BOWEL/BLADDER: up to BR  ABNL LAB/BG: Positive for occult blood in stool. Hgb 6.7 transfused 1 unit of PRBC overnight, recheck 8.1, next recheck 7.6/7.6, next draw at 2200, Hgb Q6 checks. /300  DRAIN/DEVICES: 2 L PIV infusin Protonix gtt, NS at 100 mL/hour.  SKIN: intact  TESTS/PROCEDURES: EGD done today-lg non-bleeding ulcer found   D/C DAY/GOALS/PLACE: pending stable Hgb  OTHER IMPORTANT INFO: Pt denies pain//N/V, denies dizziness, had maroon BMx1, GI following  MD/RN ROUNDING SIGNED OFF D/E SHIFT: Yes  COMMIT TO SIT DONE AND SIGNED OFF Yes    Hgb-6.6 at 2300, will start blood transfusion shortly.

## 2020-10-01 NOTE — PROGRESS NOTES
DATE & TIME: 2020 Night  Cognitive Concerns/ Orientation: Alert/oriented x 4   BEHAVIOR & AGGRESSION TOOL COLOR: Green/yellow. Pt irritable, snapping at staff.   ABNL VS/O2: VSS, room air  MOBILITY: Independent  PAIN MANAGMENT: denies  DIET: Regular  BOWEL/BLADDER: Up to bathroom  ABNL LAB/BG: Hgb 6.6 at 2236 on --1 unit transfused, tolerated well, with am recheck  DRAIN/DEVICES: 2 L PIVs infusin mL/hour normal saline and 10 mL/hour (8 mg/hr)   SKIN: Intact  TESTS/PROCEDURES: Non-bleeding ulcer found on EGD from   D/C DAY/GOALS/PLACE: Pending stable hgb  OTHER IMPORTANT INFO: GI following  COMMIT TO SIT DONE AND SIGNED OFF YES

## 2020-10-01 NOTE — DISCHARGE SUMMARY
[facility]  Hospitalist Discharge Summary      Date of Admission:  9/29/2020  Date of Discharge:  10/1/2020  Discharging Provider: Elvi Rashid MD, FACP    Discharge Diagnoses   Upper GI bleed with acute blood loss anemia, secondary to gastrojejunal anastomosis ulceration.  History of GI bleed due to anastomotic ulcer in November 2012.  Type 2 diabetes mellitus, uncontrolled.  Hyperlipidemia.  Bilateral nephrolithiasis.  Elevated creatinine.    Follow-ups Needed After Discharge   Follow-up Appointments     Follow-up and recommended labs and tests      Follow up with primary care provider, Art Cheung, within 7 days   to evaluate treatment change and for hospital follow- up.  The following   labs/tests are recommended: CBC.  GI will be contacting patient for follow up and EGD in 2 months           Unresulted Labs Ordered in the Past 30 Days of this Admission     Date and Time Order Name Status Description    9/30/2020 1507 Surgical pathology exam In process       These results will be followed up by GI    Discharge Disposition   Discharged to home  Condition at discharge: Stable    Hospital Course    Los Renee is a 70 year old male admitted on 9/29/2020.  Past history of diabetes mellitus type 2, asthma, gastric bypass with bleeding anastomotic ulcer, nephrolithiasis who presents with generalized weakness with onset of maroon stool on day of admission.Here are further details regarding the current hospitalization.    Probable upper GI bleed with acute blood loss anemia  Hx GIB due to anastomotic ulcer 11/2012  Presents with generalized weakness, lightheadedness and easy fatigability with onset of maroon stool on 9/29.  Admission hemoglobin 6.8, down from 12.8 in May 2019.  Denies any prior melena or hematochezia.  BUN elevated at 60 consistent with upper GI bleed.  No NSAID, tobacco or alcohol use.  Patient received 1 unit of packed red blood cell on admission.  Conditional  transfusion orders for hemoglobin transfusion for less than 7 were placed.  Patient was started on PPI infusion.  Patient was evaluated by [facility] and underwent EGD September 29 th, patient was found to have gastrojejunal anastomosis ulceration, biopsies were obtained gastric bypass area showed intact to staple line.    --Patient was recommended to continue on omeprazole 40 mg p.o. daily.  Patient is recommended to have repeat upper endoscopy in 2 months to check healing.  --GI will be following up on pathology results.  --Patient was a started on diet, he has been tolerating regular diet.  He did have some drop in hemoglobin yesterday to 6.6 but is improved to 8.6 this morning.  --Patient asymptomatic and is denying any lightheadedness or dizziness on standing.  --Symptomatic COVID 19 a screen was negative.     Diabetes mellitus type 2, uncontrolled  Hemoglobin A1c 9.1% in August 2020.  Reports metformin was recently discontinued and glimepiride was increased per his primary care provider earlier this week.    --During the hospitalization, patient was continued on high-dose sliding scale insulin and prandial coverage.  --On discharge she will be continued on his Amaryl 2 mg p.o. daily, for further adjustment of his diabetes medication, he is recommended to have follow-up with PCP.  --Patient will be continued on lisinopril after the discharge for diabetic nephropathy.     Hyperlipidemia  -- Continue patient on Simvastatin on discharge      Bilateral nephrolithiasis  Currently follows with urology and has plan for surgical intervention in the coming month.  - Resumed prior to admission Flomax     Elevated creatinine  Creatinine in August 2020 was 2.2, this has slowly trended down and may be stress related to his nephrolithiasis.  Prior to this, his creatinine ranged 1.1-1.4.  Admission creatinine 1.64, improved to 1.37  -- continue PTA lisinopril     Patient was seen and examined on the day of discharge , he is  feeling well, does not have any complaints , I did review the discharge medications and instructions with the patient and plan for him to follow up with the PCP after the hospitalization .patient was in agreement , he is discharged in stable condition back to his home.    Consultations This Hospital Stay   GASTROENTEROLOGY IP CONSULT    Code Status   Full Code    Time Spent on this Encounter   I, Elvi Rashid MD, personally saw the patient today and spent less than or equal to 30 minutes discharging this patient.       Elvi Rashid MD  [facility]  ______________________________________________________________________    Physical Exam   Vital Signs: Temp: 97.3  F (36.3  C) Temp src: Oral BP: 122/60 Pulse: 88   Resp: 18 SpO2: 100 % O2 Device: None (Room air)    Weight: 186 lbs 12.8 oz    Physical Exam  Vitals signs and nursing note reviewed.   Constitutional:       Appearance: He is well-developed.   HENT:      Head: Normocephalic and atraumatic.   Eyes:      Pupils: Pupils are equal, round, and reactive to light.   Neck:      Musculoskeletal: Normal range of motion and neck supple.      Thyroid: No thyromegaly.   Cardiovascular:      Rate and Rhythm: Normal rate and regular rhythm.      Heart sounds: Normal heart sounds.   Pulmonary:      Effort: Pulmonary effort is normal. No respiratory distress.      Breath sounds: Normal breath sounds.   Abdominal:      General: Bowel sounds are normal. There is no distension.      Palpations: Abdomen is soft.   Musculoskeletal: Normal range of motion.         General: No tenderness.   Skin:     General: Skin is warm and dry.   Neurological:      Mental Status: He is alert and oriented to person, place, and time.   Psychiatric:         Behavior: Behavior normal.          Primary Care Physician   Art Cheung    Discharge Orders      Reason for your hospital stay    You were admitted to the hospital secondary to GI bleed and was found to have ulcer  at anastomotic site.     Follow-up and recommended labs and tests    Follow up with primary care provider, Art Cheung, within 7 days to evaluate treatment change and for hospital follow- up.  The following labs/tests are recommended: CBC.  GI will be contacting patient for follow up and EGD in 2 months     Activity    Your activity upon discharge: activity as tolerated and no driving for today     Discharge Instructions    Please take Protonix on empty stomach in morning , your GI doctor has advised to get repeated EGD in 2 months They will be contacting you to schedule the F/U appointment     Full Code     Diet    Follow this diet upon discharge: Orders Placed This Encounter      Regular Diet Adult         Significant Results and Procedures   Results for orders placed or performed during the hospital encounter of 09/19/20   CT Abdomen Pelvis w/o Contrast    Narrative    CT ABDOMEN AND PELVIS WITHOUT CONTRAST 9/19/2020 10:45 AM    CLINICAL HISTORY: Hydronephrosis of left kidney. Nephrolithiasis.    TECHNIQUE: CT scan of the abdomen and pelvis was performed without IV  contrast. Multiplanar reformats were obtained. Dose reduction  techniques were used.    CONTRAST: None.    COMPARISON: 10/5/2016    FINDINGS:   LOWER CHEST: Normal.    HEPATOBILIARY: There are small calcified gallstones. The liver is  unremarkable.    PANCREAS: Normal.    SPLEEN: Normal.    ADRENAL GLANDS: Nodular thickening left adrenal gland unchanged and  compatible with adenoma. Right adrenal gland is normal.    KIDNEYS/BLADDER: There are multiple large bilateral urinary tract  calculi. No ureteral calcifications. A large calculus at the left  ureteropelvic junction measures up to 1.6 cm. Mild left  hydronephrosis. Mild left perinephric infiltration.    BOWEL: Evidence of prior bariatric surgery. No bowel obstruction or  free air. The appendix has been removed.    LYMPH NODES: Unremarkable.    VASCULATURE: Mild nonaneurysmal aortic  atherosclerosis.    PELVIC ORGANS: Normal.    OTHER: Small fat-containing bilateral inguinal hernias.    MUSCULOSKELETAL: Unremarkable.      Impression    IMPRESSION:   1.  Mild left hydronephrosis with a large 1.6 cm calculus at the left  ureteropelvic junction.  2.  Numerous large bilateral urinary tract calculi. Stone burden  increased compared to 2016.    TAQUERIA WINSLOW MD       Discharge Medications   Current Discharge Medication List      START taking these medications    Details   pantoprazole (PROTONIX) 40 MG EC tablet Take 1 tablet (40 mg) by mouth every morning (before breakfast)  Qty: 30 tablet, Refills: 1    Comments: Future refills by PCP Dr. Taqueria Cheung with phone number [phone].  Associated Diagnoses: Acute upper GI bleed         CONTINUE these medications which have NOT CHANGED    Details   calcium-vitamin D (CALCIUM 600 + D) 600-400 MG-UNIT per tablet Take 1 tablet by mouth daily  Qty: 100 tablet, Refills: PRN    Associated Diagnoses: Bariatric surgery status; Vitamin D deficiency; Recurrent kidney stones      Cholecalciferol (VITAMIN D) 2000 units tablet Take 2,000 Units by mouth daily  Qty: 90 tablet, Refills: 3    Associated Diagnoses: Bariatric surgery status; Vitamin D deficiency      Cyanocobalamin (B-12 SUPER STRENGTH) 5000 MCG/ML LIQD Place 0.5 mLs under the tongue every other day FOR VITAMIN B12 SUPPLEMENTATION, PLEASE PLACE UNDER THE TONGUE  Qty: 2 Bottle, Refills: PRN    Associated Diagnoses: Bariatric surgery status; Vitamin B12 deficiency (non anemic)      ferrous sulfate (FEROSUL) 325 (65 Fe) MG tablet Take 325 mg by mouth daily (with breakfast)      glimepiride (AMARYL) 2 MG tablet Take 1 mg by mouth 2 times daily (with meals)      lisinopril (ZESTRIL) 20 MG tablet Take 1 tablet (20 mg) by mouth daily  Qty: 90 tablet, Refills: 0    Comments: PROFILE FOR FUTURE REFILLS  Associated Diagnoses: Type 2 diabetes mellitus with diabetic polyneuropathy, without long-term current  use of insulin (H); Essential hypertension      multivitamin w/minerals (THERA-VIT-M) tablet Take 1 tablet by mouth daily      potassium citrate (UROCIT-K) 10 MEQ (1080 MG) CR tablet TAKE 1 TABLET BY MOUTH TWICE A DAY  Qty: 180 tablet, Refills: 3    Associated Diagnoses: Uric acid kidney stone; Recurrent kidney stones      simvastatin (ZOCOR) 20 MG tablet Take 1 tablet (20 mg) by mouth At Bedtime  Qty: 90 tablet, Refills: 3    Comments: PROFILE FOR FUTURE REFILLS  Associated Diagnoses: Type 2 diabetes mellitus with diabetic polyneuropathy, without long-term current use of insulin (H)      tamsulosin (FLOMAX) 0.4 MG capsule Take 1 capsule (0.4 mg) by mouth daily  Qty: 90 capsule, Refills: 0    Associated Diagnoses: Benign prostatic hyperplasia with lower urinary tract symptoms, symptom details unspecified      blood glucose (ACCU-CHEK UCHE PLUS) test strip 1 strip by In Vitro route daily  Qty: 100 strip, Refills: 11    Associated Diagnoses: Type 2 diabetes mellitus with diabetic polyneuropathy, without long-term current use of insulin (H)      blood glucose monitoring (ACCU-CHEK UCHE PLUS) meter device kit TEST BLOOD SUGAR ONE TIME DAILY  OR AS DIRECTED  Qty: 1 kit, Refills: 0    Associated Diagnoses: Type 2 diabetes mellitus with diabetic polyneuropathy, without long-term current use of insulin (H)      Lactobacillus (PROBIOTIC ACIDOPHILUS) CAPS Take by mouth daily            Allergies   Allergies   Allergen Reactions     Simvastatin Nausea

## 2020-10-01 NOTE — PLAN OF CARE
Discharge    Patient discharged to home via car with family  Care plan note  Patient is a full code, alert and oriented x4, and vital signs are stable on room air. Patient is independent, steady on feet, and denies lightheadedness/dizziness. Patient is continent, and up to the bathroom. No BM this shift. Patient is on a regular diet, tolerating it well. Patient's blood sugars were 229 & 232, coverage was provided. Hgb was 8.6, asymptomatic. Skin is WDL and intact. The PIV was removed prior to discharge. The AVS was reviewed with the patient, and the patient stated an understanding. Belongings were sent home with the patient.     Listed belongings gathered and returned to patient. Yes  Care Plan and Patient education resolved: Yes  Prescriptions if needed, hard copies sent with patient  NA  Home and hospital acquired medications returned to patient: Yes  Medication Bin checked and emptied on discharge Yes  Follow up appointment made for patient: No - patient said he will make an appointment

## 2020-10-01 NOTE — PROGRESS NOTES
"[facility]  [facility]  Gastroenterology Progress note    Interval History:    Tolerating regular diet.  Hemoglobin down to 6.6 last night.  Repeat hemoglobin pending.  No signs of bleeding.        Vital Signs:      /60 (BP Location: Right arm)   Pulse 88   Temp 97.3  F (36.3  C) (Oral)   Resp 18   Ht 1.727 m (5' 8\")   Wt 84.7 kg (186 lb 12.8 oz)   SpO2 100%   BMI 28.40 kg/m    Temp (24hrs), Av.7  F (36.5  C), Min:97.2  F (36.2  C), Max:98  F (36.7  C)    Patient Vitals for the past 72 hrs:   Weight   20 1821 84.7 kg (186 lb 12.8 oz)   20 1609 83.5 kg (184 lb)       Intake/Output Summary (Last 24 hours) at 10/1/2020 0915  Last data filed at 10/1/2020 0800  Gross per 24 hour   Intake 3663 ml   Output --   Net 3663 ml         Constitutional: NAD, comfortable  Cardiovascular: RRR, normal S1, S2   Respiratory: CTAB  Abdomen: soft, non-tender, nondistended    Additional Comments:  ROS, FH, SH: See initial GI consult for details.    Laboratory Data:  Recent Labs   Lab Test 20  2236 20  1813 20  1236 20  1622 17  0918 17  0918 17  1829 12  0921 12  0921   WBC  --   --   --   --   --  5.6  --  5.9 5.5   < > 8.1   HGB 6.6* 7.6* 7.6*   < >  --  6.8*   < > 13.6 13.0*   < > 10.0*   MCV  --   --   --   --   --  90  --  96 89   < > 83   PLT  --   --   --   --   --  234  --  153 209   < > 202   INR  --   --   --   --  1.20* 1.20*  --   --   --   --  1.12    < > = values in this interval not displayed.     Recent Labs   Lab Test 20  0932 20  1622 09/10/20  1218    133 136   POTASSIUM 4.6 4.9 4.7   CHLORIDE 110* 107 105   CO2 21 20 24   BUN 42* 60* 36*   CR 1.45* 1.64* 1.82*   ANIONGAP 5 6 7   CIARRA 8.1* 8.4* 9.1     Recent Labs   Lab Test 20  1622 18  0753 17  0918 17  1829 10/11/16  1441 10/11/16  1441 16  0813   ALBUMIN 3.1*  --  3.8 " 4.4   < >  --   --    BILITOTAL 0.3  --  0.6 0.4   < >  --   --    DBIL <0.1  --   --   --   --   --   --    ALT 24 38 55 29   < >  --   --    AST 12  --  33 14   < >  --   --    ALKPHOS 77  --  84 103   < >  --   --    PROTEIN  --   --   --   --   --  30* 100*    < > = values in this interval not displayed.         Assessment:  69 yo male with history of Latanya-en-Y and bleeding anastomotic ulcer in 2012.  Now presents with maroon stool x 1 day and acute drop in hemoglobin.  Elevated BUN consistent with upper GI bleeding.  EGD performed yesterday showed gastric bypass with anastomotic ulcer which was clean based with no visible vessel.  Biopsies to rule out H. Pylori pending.    Plan:  -  Diet as tolerated.  -  PPI daily.  -  Repeat hemoglobin pending.  -  Repeat EGD in 2 months to assess healing.      CRYSTAL Keyes  [facility]  Office:  [phone] call if needed after 1PM  Cell:  [phone], not available after 1PM at this number

## 2020-10-02 ENCOUNTER — TELEPHONE (OUTPATIENT)
Dept: INTERNAL MEDICINE | Facility: CLINIC | Age: 70
End: 2020-10-02

## 2020-10-02 LAB
BLD PROD TYP BPU: NORMAL
BLD UNIT ID BPU: 0
BLOOD PRODUCT CODE: NORMAL
BPU ID: NORMAL
TRANSFUSION STATUS PATIENT QL: NORMAL
TRANSFUSION STATUS PATIENT QL: NORMAL

## 2020-10-02 NOTE — TELEPHONE ENCOUNTER
"    Hospital/TCU/ED for chronic condition Discharge Protocol    \"Hi, my name is Nahomy Meehan RN, a registered nurse, and I am calling from [facility]  I am calling to follow up and see how things are going for you after your recent emergency visit/hospital/TCU stay.\"    Tell me how you are doing now that you are home?\"   Doing ok, think he knows why he had bleeding, ulcer that was bleeding. 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 He thinks that he is worrying about using the bathroom constantly. Earlier in the last few weeks had some abd pain, would wake up in the morning, thinking about where he will need to go to the bathroom throughout the day and worrying and stressing about this.  No pain which is hard to monitor his urology symptoms.       Discharge Instructions    \"Let's review your discharge instructions.  What is/are the follow-up recommendations?  Pt. Response: Follow up with primary care provider, Art Cheung, within 7 days   to evaluate treatment change and for hospital follow- up.  The following   labs/tests are recommended: CBC.  GI will be contacting patient for follow up and EGD in 2 months       \"Has an appointment with your primary care provider been scheduled?\"   Yes. (confirm)    \"When you see the provider, I would recommend that you bring your medications with you.\"    Medications    \"Tell me what changed about your medicines when you discharged?\"    Changes to chronic meds?    0-1    \"What questions do you have about your medications?\"    None     New diagnoses of heart failure, COPD, diabetes, or MI?    No              Post Discharge Medication Reconciliation Status: discharge medications reconciled, continue medications without change.    Was MTM referral placed (*Make sure to put transitions as reason for referral)?   No    Call Summary    \"What questions or concerns do you have about your recent visit and your follow-up care?\"     none    \"If you have questions or things don't " "continue to improve, we encourage you contact us through the main clinic number (give number).  Even if the clinic is not open, triage nurses are available 24/7 to help you.     We would like you to know that our clinic has extended hours (provide information).  We also have urgent care (provide details on closest location and hours/contact info)\"      \"Thank you for your time and take care!\"             "

## 2020-10-03 LAB
BLD PROD TYP BPU: NORMAL
BLD UNIT ID BPU: 0
BLOOD PRODUCT CODE: NORMAL
BPU ID: NORMAL
COPATH REPORT: NORMAL
TRANSFUSION STATUS PATIENT QL: NORMAL
TRANSFUSION STATUS PATIENT QL: NORMAL

## 2020-10-12 ENCOUNTER — OFFICE VISIT (OUTPATIENT)
Dept: INTERNAL MEDICINE | Facility: CLINIC | Age: 70
End: 2020-10-12
Payer: COMMERCIAL

## 2020-10-12 VITALS
TEMPERATURE: 97.4 F | HEIGHT: 68 IN | HEART RATE: 94 BPM | DIASTOLIC BLOOD PRESSURE: 58 MMHG | OXYGEN SATURATION: 99 % | BODY MASS INDEX: 27.89 KG/M2 | SYSTOLIC BLOOD PRESSURE: 110 MMHG | WEIGHT: 184 LBS

## 2020-10-12 DIAGNOSIS — N20.0 RECURRENT KIDNEY STONES: ICD-10-CM

## 2020-10-12 DIAGNOSIS — K92.2 ACUTE UPPER GI BLEED: ICD-10-CM

## 2020-10-12 DIAGNOSIS — Z01.818 PREOP GENERAL PHYSICAL EXAM: Primary | ICD-10-CM

## 2020-10-12 DIAGNOSIS — E11.42 TYPE 2 DIABETES MELLITUS WITH DIABETIC POLYNEUROPATHY, WITHOUT LONG-TERM CURRENT USE OF INSULIN (H): ICD-10-CM

## 2020-10-12 LAB
ANION GAP SERPL CALCULATED.3IONS-SCNC: 5 MMOL/L (ref 3–14)
BUN SERPL-MCNC: 22 MG/DL (ref 7–30)
CALCIUM SERPL-MCNC: 8.8 MG/DL (ref 8.5–10.1)
CHLORIDE SERPL-SCNC: 106 MMOL/L (ref 94–109)
CO2 SERPL-SCNC: 24 MMOL/L (ref 20–32)
CREAT SERPL-MCNC: 2.29 MG/DL (ref 0.66–1.25)
GFR SERPL CREATININE-BSD FRML MDRD: 28 ML/MIN/{1.73_M2}
GLUCOSE SERPL-MCNC: 237 MG/DL (ref 70–99)
HGB BLD-MCNC: 7.4 G/DL (ref 13.3–17.7)
POTASSIUM SERPL-SCNC: 4.3 MMOL/L (ref 3.4–5.3)
SODIUM SERPL-SCNC: 135 MMOL/L (ref 133–144)

## 2020-10-12 PROCEDURE — 85018 HEMOGLOBIN: CPT | Performed by: INTERNAL MEDICINE

## 2020-10-12 PROCEDURE — 99215 OFFICE O/P EST HI 40 MIN: CPT | Performed by: INTERNAL MEDICINE

## 2020-10-12 PROCEDURE — 36415 COLL VENOUS BLD VENIPUNCTURE: CPT | Performed by: INTERNAL MEDICINE

## 2020-10-12 PROCEDURE — 80048 BASIC METABOLIC PNL TOTAL CA: CPT | Performed by: INTERNAL MEDICINE

## 2020-10-12 RX ORDER — GLIMEPIRIDE 1 MG/1
4 TABLET ORAL
Qty: 360 TABLET | Refills: 0
Start: 2020-10-12 | End: 2020-11-11

## 2020-10-12 RX ORDER — GLIMEPIRIDE 1 MG/1
TABLET ORAL
COMMUNITY
Start: 2020-10-12 | End: 2020-10-12

## 2020-10-12 RX ORDER — GLIMEPIRIDE 1 MG/1
3 TABLET ORAL
Start: 2020-10-12 | End: 2020-10-12

## 2020-10-12 ASSESSMENT — MIFFLIN-ST. JEOR: SCORE: 1579.12

## 2020-10-12 NOTE — PROGRESS NOTES
[address] [address]  [phone]  Dept: [phone]    Hospital Follow-up Visit:  Hospital/Nursing Home/IP Rehab Facility: [facility]  Date of Admission: 20  Date of Discharge: 10/1/20  Reason(s) for Admission: Upper GI Bleed      Was your hospitalization related to COVID-19? No   Problems taking medications regularly:  None  Medication changes since discharge: None  Problems adhering to non-medication therapy:  None    Summary of hospitalization:  [facility] discharge summary reviewed  Diagnostic Tests/Treatments reviewed.  Follow up needed: none  Other Healthcare Providers Involved in Patient s Care:         None  Update since discharge: improved.     Post Discharge Medication Reconciliation: discharge medications reconciled and changed, per note/orders.  Plan of care communicated with patient     PRE-OP EVALUATION:  Today's date: 10/12/2020    Los Renee (: 1950) presents for pre-operative evaluation assessment as requested by Dr. Le.  He requires evaluation and anesthesia risk assessment prior to undergoing surgery/procedure for treatment of kidney stones .    Proposed Surgery/ Procedure: CYSTOSCOPY, LEFT RETROGRADE PYELOGRAM, LEFT URETEROSCOPY, LEFT PERCUTANEOUS NEPHROLITHOTOMY, LEFT URETERAL STENT PLACEMENT  Date of Surgery/ Procedure: 10/21/20  Time of Surgery/ Procedure: [facility]/Surgical Facility: [facility]  Surgery Fax Number: Note does not need to be faxed, will be available electronically in Epic.  Primary Physician: Art Cheung  Type of Anesthesia Anticipated: to be determined    Preoperative Questionnaire:   No - Have you ever had a heart attack or stroke?  No - Have you ever had surgery on your heart or blood vessels, such as a stent, coronary (heart) bypass, or surgery on an artery in the head, neck, heart, or legs?  No - Do you have chest pain when you are  physically active?  No - Do you have a history of heart failure?  No - Do you currently have a cold, bronchitis, or symptoms of other respiratory (head and chest) infections?  No - Do you have a cough, shortness of breath, or wheezing?  No - Do you or anyone in your family have a history of blood clots?  No - Do you or anyone in your family have a serious bleeding problem, such as long-lasting bleeding after surgeries or cuts?  YES - Have you ever had anemia or been told to take iron pills?  YES - Have you had any abnormal blood loss such as black, tarry or bloody stools, or abnormal vaginal bleeding?  YES - Have you ever had a blood transfusion?  Yes - Are you willing to have a blood transfusion if it is medically needed before, during, or after your surgery?  No - Have you or anyone in your family ever had problems with anesthesia (sedation for surgery)?  No - Do you have sleep apnea, excessive snoring, or daytime drowsiness?   No - Do you have any artifical heart valves or other implanted medical devices, such as a pacemaker, defibrillator, or continuous glucose monitor?  YES-both knees - Do you have any artifical joints?  No - Are you allergic to latex?  No - Is there any chance that you may be pregnant?    Patient has a Health Care Directive or Living Will:  YES on file    HPI:     HPI related to upcoming procedure:     History of bilateral nephrolithiasis  - ESWL with Dr. Ricketts 6/1/2015 for 1.1cm RIGHT lower pole stone  - Ureteroscopy with Dr. Freitas for RIGHT side 11/3/2016  He was noted to have rise of his SCr to 2.0 from baseline of 1.1-1.4. A renal U/S was obtained which demonstrated LEFT hydronephrosis with probably ureteral stone.  A CT scan was obtained which demonstrated a LEFT 1.6cm LEFT UPJ stone with additional large bilateral kidney stones  He was referred for further management.   He denies flank pain or symptoms related to the stones.     Probable upper GI bleed with acute blood loss  anemia  Hx GIB due to anastomotic ulcer 11/2012  Presents with generalized weakness, lightheadedness and easy fatigability with onset of maroon stool on 9/29.  Admission hemoglobin 6.8, down from 12.8 in May 2019.  Denies any prior melena or hematochezia.  BUN elevated at 60 consistent with upper GI bleed.  No NSAID, tobacco or alcohol use.  Patient received 1 unit of packed red blood cell on admission.  Conditional transfusion orders for hemoglobin transfusion for less than 7 were placed.  Patient was started on PPI infusion.  Patient was evaluated by [facility] and underwent EGD September 29th, patient was found to have gastrojejunal anastomosis ulceration, biopsies were obtained gastric bypass area showed intact to staple line.     --Patient was recommended to continue on omeprazole 40 mg p.o. daily.  Patient is recommended to have repeat upper endoscopy in 2 months to check healing.  --GI will be following up on pathology results.  --Patient was a started on diet, he has been tolerating regular diet.  He did have some drop in hemoglobin yesterday to 6.6 but is improved to 8.6 this morning.  --Patient asymptomatic and is denying any lightheadedness or dizziness on standing.  --Symptomatic COVID 19 a screen was negative.     Diabetes mellitus type 2, uncontrolled  Hemoglobin A1c 9.1% in August 2020.  Reports metformin and SGLT2i was recently discontinued and glimepiride was increased per his primary care provider earlier this week.  --During the hospitalization, patient was continued on high-dose sliding scale insulin and prandial coverage.  --On discharge she will be continued on his Amaryl 2 mg p.o. daily, for further adjustment of his diabetes medication, he is recommended to have follow-up with PCP.  --Patient will be continued on lisinopril after the discharge for diabetic nephropathy.       MEDICAL HISTORY:     Patient Active Problem List    Diagnosis Date Noted     Acute upper GI bleed 09/29/2020      Priority: Medium     Hydronephrosis of left kidney 09/28/2020     Priority: Medium     Added automatically from request for surgery [id number]       Nephrolithiasis 09/28/2020     Priority: Medium     Added automatically from request for surgery [id number]       ACP (advance care planning) 08/24/2017     Priority: Medium     Advance Care Planning 8/24/2017: ACP Review of Chart / Resources Provided:  Reviewed chart for advance care plan.  Los Renee has no plan or code status on file however states presence of ACP document. Copy requested.   Added by Lynette Dalton             Malnutrition following gastrointestinal surgery 04/10/2017     Priority: Medium     Uric acid kidney stone 04/04/2017     Priority: Medium     Thiamine deficiency 04/04/2017     Priority: Medium     Diarrhea due to malabsorption 04/04/2017     Priority: Medium     Vitamin C deficiency 04/04/2017     Priority: Medium     Retinopathy due to secondary diabetes mellitus (H) 04/04/2017     Priority: Medium     Type 2 diabetes mellitus with diabetic polyneuropathy, without long-term current use of insulin (H) 01/31/2017     Priority: Medium     Vitamin B6 induced neuropathy (H) 01/31/2017     Priority: Medium     Bariatric surgery status 01/31/2017     Priority: Medium     WITH MILDLY LOW FOLATE       Vitamin B12 deficiency (non anemic) 01/31/2017     Priority: Medium     Vitamin D deficiency 01/31/2017     Priority: Medium     Iron deficiency anemia, unspecified iron deficiency anemia type 01/31/2017     Priority: Medium     Renal calculus 01/31/2017     Priority: Medium     Testosterone deficiency 01/31/2017     Priority: Medium     Fatigue, unspecified type 01/31/2017     Priority: Medium     Recurrent kidney stones 01/31/2017     Priority: Medium     CARDIOVASCULAR SCREENING; LDL GOAL LESS THAN 100 01/31/2017     Priority: Medium     Lumbar pain 02/02/2012     Priority: Medium     Hyperlipidemia LDL goal <70 12/14/2010     Priority: Medium      Essential hypertension 01/07/2008     Priority: Medium     Problem list name updated by automated process. Provider to review        Past Medical History:   Diagnosis Date     Arthritis      Gastric bypass status for obesity      Gastro-oesophageal reflux disease      GI bleed 11/1/2012     Intermittent asthma     related to allergies- rare symptoms     Mixed hyperlipidemia 1/7/2008     Morbid obesity (H) 01/24/2008    s/p gastric bypass surgery     Morbid obesity (H) 1/24/2008     Mumps     in the early 60's     Nephrolithiasis      SOLITARIO (obstructive sleep apnea) 2010          Other chronic pain     chronic left knee pain     Spider veins      Swelling of testicle      Type 2 diabetes mellitus (H) 01/07/2008     Unspecified essential hypertension 1/7/2008    resolved since gastric surgery     Past Surgical History:   Procedure Laterality Date     COLONOSCOPY N/A 7/5/2018    Procedure: COMBINED COLONOSCOPY, SINGLE OR MULTIPLE BIOPSY/POLYPECTOMY BY BIOPSY;  colonoscopy;  Surgeon: Max Santizo MD;  Location:  GI     CYSTOSCOPY       ESOPHAGOSCOPY, GASTROSCOPY, DUODENOSCOPY (EGD), COMBINED N/A 9/30/2020    Procedure: ESOPHAGOGASTRODUODENOSCOPY, WITH BIOPSY;  Surgeon: Neda Wheeler MD;  Location:  GI     EXTRACORPOREAL SHOCK WAVE LITHOTRIPSY, CYSTOSCOPY, INSERT STENT URETER(S), COMBINED Right 6/1/2015    Procedure: COMBINED EXTRACORPOREAL SHOCK WAVE LITHOTRIPSY, CYSTOSCOPY, INSERT STENT URETER(S);  Surgeon: Jeovanny Ricketts MD;  Location:  OR     KNEE SURGERY       LAPAROSCOPIC BYPASS GASTRIC       LASER HOLMIUM LITHOTRIPSY URETER(S), INSERT STENT, COMBINED Right 11/3/2016    Procedure: COMBINED CYSTOSCOPY, URETEROSCOPY, LASER HOLMIUM LITHOTRIPSY URETER(S), INSERT STENT;  Surgeon: Neli Freitas MD;  Location:  OR     ORTHOPEDIC SURGERY      bilat knee replaCEMENTS     VASECTOMY       Current Outpatient Medications   Medication Sig Dispense Refill     blood glucose (ACCU-CHEK  UCHE PLUS) test strip 1 strip by In Vitro route daily 100 strip 11     blood glucose monitoring (ACCU-CHEK UCHE PLUS) meter device kit TEST BLOOD SUGAR ONE TIME DAILY  OR AS DIRECTED 1 kit 0     calcium-vitamin D (CALCIUM 600 + D) 600-400 MG-UNIT per tablet Take 1 tablet by mouth daily 100 tablet PRN     Cholecalciferol (VITAMIN D) 2000 units tablet Take 2,000 Units by mouth daily 90 tablet 3     Cyanocobalamin (B-12 SUPER STRENGTH) 5000 MCG/ML LIQD Place 0.5 mLs under the tongue every other day FOR VITAMIN B12 SUPPLEMENTATION, PLEASE PLACE UNDER THE TONGUE 2 Bottle PRN     ferrous sulfate (FEROSUL) 325 (65 Fe) MG tablet Take 325 mg by mouth daily (with breakfast)       glimepiride (AMARYL) 1 MG tablet Take 3 tablets (3 mg) by mouth every morning (before breakfast)       Lactobacillus (PROBIOTIC ACIDOPHILUS) CAPS Take by mouth daily        lisinopril (ZESTRIL) 20 MG tablet Take 1 tablet (20 mg) by mouth daily 90 tablet 0     multivitamin w/minerals (THERA-VIT-M) tablet Take 1 tablet by mouth daily       pantoprazole (PROTONIX) 40 MG EC tablet Take 1 tablet (40 mg) by mouth every morning (before breakfast) 30 tablet 1     potassium citrate (UROCIT-K) 10 MEQ (1080 MG) CR tablet TAKE 1 TABLET BY MOUTH TWICE A  tablet 3     simvastatin (ZOCOR) 20 MG tablet Take 1 tablet (20 mg) by mouth At Bedtime 90 tablet 3     tamsulosin (FLOMAX) 0.4 MG capsule Take 1 capsule (0.4 mg) by mouth daily 90 capsule 0     OTC products: None, except as noted above    Allergies   Allergen Reactions     Simvastatin Nausea      Latex Allergy: NO    Social History     Tobacco Use     Smoking status: Never Smoker     Smokeless tobacco: Never Used   Substance Use Topics     Alcohol use: No     Alcohol/week: 0.0 standard drinks     History   Drug Use No       REVIEW OF SYSTEMS:   Constitutional, neuro, ENT, endocrine, pulmonary, cardiac, gastrointestinal, genitourinary, musculoskeletal, integument and psychiatric systems are negative,  "except as otherwise noted.    EXAM:   /58   Pulse 94   Temp 97.4  F (36.3  C) (Temporal)   Ht 1.727 m (5' 8\")   Wt 83.5 kg (184 lb)   SpO2 99%   BMI 27.98 kg/m      GENERAL APPEARANCE: alert and no distress     EYES: Eyes grossly normal to inspection     HENT: nose and mouth without ulcers or lesions and normal cephalic/atraumatic     NECK: no adenopathy, no asymmetry, masses, or scars and thyroid normal to palpation     RESP: lungs clear to auscultation - no rales, rhonchi or wheezes     CV: regular rates and rhythm, normal S1 S2, no S3 or S4 and no murmur, click or rub     ABDOMEN:  soft, nontender, no HSM or masses and bowel sounds normal     MS: extremities normal- no gross deformities noted, no evidence of inflammation in joints, FROM in all extremities.     SKIN: no suspicious lesions or rashes     NEURO: Normal strength and tone, sensory exam grossly normal, mentation intact and speech normal     LYMPHATICS: No cervical adenopathy    DIAGNOSTICS:     Labs Resulted Today:   Results for orders placed or performed in visit on 10/12/20   Basic metabolic panel     Status: Abnormal   Result Value Ref Range    Sodium 135 133 - 144 mmol/L    Potassium 4.3 3.4 - 5.3 mmol/L    Chloride 106 94 - 109 mmol/L    Carbon Dioxide 24 20 - 32 mmol/L    Anion Gap 5 3 - 14 mmol/L    Glucose 237 (H) 70 - 99 mg/dL    Urea Nitrogen 22 7 - 30 mg/dL    Creatinine 2.29 (H) 0.66 - 1.25 mg/dL    GFR Estimate 28 (L) >60 mL/min/[1.73_m2]    GFR Estimate If Black 33 (L) >60 mL/min/[1.73_m2]    Calcium 8.8 8.5 - 10.1 mg/dL   Hemoglobin     Status: Abnormal   Result Value Ref Range    Hemoglobin 7.4 (LL) 13.3 - 17.7 g/dL       Recent Labs   Lab Test 10/01/20  0901 09/30/20 2236 09/30/20  0932 09/30/20  0932 09/29/20 2017 09/29/20 2017 09/29/20  1622 08/17/20  0750 08/17/20  0750 03/06/20  0800 08/12/17  0918 08/12/17  0918   HGB 8.6* 6.6*   < > 8.1*   < >  --  6.8*  --   --   --    < > 13.6   PLT  --   --   --   --   --   --  " 234  --   --   --   --  153   INR  --   --   --   --   --  1.20* 1.20*  --   --   --   --   --      --   --  136  --   --  133   < > 132*  --    < > 139   POTASSIUM 4.4  --   --  4.6  --   --  4.9   < > 4.3  --    < > 4.1   CR 1.37*  --   --  1.45*  --   --  1.64*   < > 2.21*  --    < > 1.11   A1C  --   --   --   --   --   --   --   --  9.1* 7.6*   < > 6.9*    < > = values in this interval not displayed.        IMPRESSION:   Reason for surgery/procedure: nephrolithiasis w/post-obstructive ARF  Diagnosis/reason for consult: T2DM, recent GI bleed resulting in blood loss anemia    The proposed surgical procedure is considered INTERMEDIATE risk.    REVISED CARDIAC RISK INDEX  The patient has the following serious cardiovascular risks for perioperative complications such as (MI, PE, VFib and 3  AV Block):  Serum Creatinine >2.0 mg/dl  INTERPRETATION: 1 risks: Class II (low risk - 0.9% complication rate)    The patient has the following additional risks for perioperative complications:  Significant bleeding history with recent GI bleed      ICD-10-CM    1. Preop general physical exam  Z01.818 Basic metabolic panel     Hemoglobin   2. Recurrent kidney stones  N20.0 Basic metabolic panel   3. Acute upper GI bleed  K92.2 Hemoglobin     Hemoglobin   4. Type 2 diabetes mellitus with diabetic polyneuropathy, without long-term current use of insulin (H)  E11.42 glimepiride (AMARYL) 1 MG tablet       RECOMMENDATIONS:     --Consult hospital rounder / IM to assist post-op medical management    Anemia  hgb stable but remains well below baseline prior to GI bleed and renal impairment from stone.  No evidence of active bleeding currently.  Given his GFR his marrow response to his recent bleed is likely impaired and would anticipate a slow rate of improvement until his renal function improves.   He may require PRBC during/after his procedure depending on the anticipated blood loss.  Discussed with Dr. Le.     --Patient is to  take all scheduled medications on the day of surgery EXCEPT for modifications listed below.    Diabetes Medication Use  Hold glimepiride AM of surgery  Since d/c of metformin and DPP4i- significant hyperglycemia has occurred. If renal function improves post op- restarting metformin and his DPP4i as his GFR allows. Until then would recommend sliding scale and basal insulin while hospitalized.      ACE Inhibitor or Angiotensin Receptor Blocker (ARB) Use  Due to worsening GFR, anemia  hold lisinopril till postoperatively.     APPROVAL GIVEN to proceed with proposed procedure, without further diagnostic evaluation after discussion with Dr. Le.      Signed Electronically by: Art Cheung MD    Copy of this evaluation report is provided to requesting physician.    [place] Preop Guidelines    Revised Cardiac Risk Index

## 2020-10-12 NOTE — NURSING NOTE
"Chief Complaint   Patient presents with     Pre-Op Exam     /58   Pulse 94   Temp 97.4  F (36.3  C) (Temporal)   Ht 1.727 m (5' 8\")   Wt 83.5 kg (184 lb)   SpO2 99%   BMI 27.98 kg/m   Estimated body mass index is 27.98 kg/m  as calculated from the following:    Height as of this encounter: 1.727 m (5' 8\").    Weight as of this encounter: 83.5 kg (184 lb).          Ingrid Neff CMA  "

## 2020-10-12 NOTE — PROGRESS NOTES
Subjective     Los Renee is a 70 year old male who presents to clinic today for the following health issues:    HPI

## 2020-10-12 NOTE — PATIENT INSTRUCTIONS

## 2020-10-18 DIAGNOSIS — Z11.59 ENCOUNTER FOR SCREENING FOR OTHER VIRAL DISEASES: ICD-10-CM

## 2020-10-18 PROCEDURE — U0003 INFECTIOUS AGENT DETECTION BY NUCLEIC ACID (DNA OR RNA); SEVERE ACUTE RESPIRATORY SYNDROME CORONAVIRUS 2 (SARS-COV-2) (CORONAVIRUS DISEASE [COVID-19]), AMPLIFIED PROBE TECHNIQUE, MAKING USE OF HIGH THROUGHPUT TECHNOLOGIES AS DESCRIBED BY CMS-2020-01-R: HCPCS | Performed by: UROLOGY

## 2020-10-19 LAB
SARS-COV-2 RNA SPEC QL NAA+PROBE: NOT DETECTED
SPECIMEN SOURCE: NORMAL

## 2020-10-20 ENCOUNTER — ANESTHESIA EVENT (OUTPATIENT)
Dept: SURGERY | Facility: CLINIC | Age: 70
End: 2020-10-20
Payer: COMMERCIAL

## 2020-10-20 DIAGNOSIS — K92.2 ACUTE UPPER GI BLEED: ICD-10-CM

## 2020-10-20 DIAGNOSIS — N17.9 ACUTE RENAL FAILURE, UNSPECIFIED ACUTE RENAL FAILURE TYPE (H): ICD-10-CM

## 2020-10-20 DIAGNOSIS — R19.7 DIARRHEA, UNSPECIFIED TYPE: ICD-10-CM

## 2020-10-20 LAB
ANION GAP SERPL CALCULATED.3IONS-SCNC: 5 MMOL/L (ref 3–14)
BUN SERPL-MCNC: 27 MG/DL (ref 7–30)
CALCIUM SERPL-MCNC: 8.7 MG/DL (ref 8.5–10.1)
CHLORIDE SERPL-SCNC: 106 MMOL/L (ref 94–109)
CO2 SERPL-SCNC: 25 MMOL/L (ref 20–32)
CREAT SERPL-MCNC: 2.16 MG/DL (ref 0.66–1.25)
ERYTHROCYTE [DISTWIDTH] IN BLOOD BY AUTOMATED COUNT: 15.4 % (ref 10–15)
GFR SERPL CREATININE-BSD FRML MDRD: 30 ML/MIN/{1.73_M2}
GLUCOSE SERPL-MCNC: 295 MG/DL (ref 70–99)
HCT VFR BLD AUTO: 26 % (ref 40–53)
HGB BLD-MCNC: 8.1 G/DL (ref 13.3–17.7)
MCH RBC QN AUTO: 29.5 PG (ref 26.5–33)
MCHC RBC AUTO-ENTMCNC: 31.2 G/DL (ref 31.5–36.5)
MCV RBC AUTO: 95 FL (ref 78–100)
PLATELET # BLD AUTO: 286 10E9/L (ref 150–450)
POTASSIUM SERPL-SCNC: 4.7 MMOL/L (ref 3.4–5.3)
RBC # BLD AUTO: 2.75 10E12/L (ref 4.4–5.9)
SODIUM SERPL-SCNC: 136 MMOL/L (ref 133–144)
WBC # BLD AUTO: 4.3 10E9/L (ref 4–11)

## 2020-10-20 PROCEDURE — 80048 BASIC METABOLIC PNL TOTAL CA: CPT | Performed by: INTERNAL MEDICINE

## 2020-10-20 PROCEDURE — 85027 COMPLETE CBC AUTOMATED: CPT | Performed by: INTERNAL MEDICINE

## 2020-10-20 PROCEDURE — 36415 COLL VENOUS BLD VENIPUNCTURE: CPT | Performed by: INTERNAL MEDICINE

## 2020-10-20 NOTE — OR NURSING
Notes from pt's primary doctor state, pt need hemoglobin rechecked prior to surgery.  Pt not cleared for surgery if hemoglobin not drawn.  [facility] is calling Dr Le's office to notify them.  Pt's last hemoglobin was 7.4 on 10/12/2020, pt was in hospital for upper GI bleed 9/29/2020-10/1/2020.  Waiting to hear back from Dr. Le's office.  Left message with patient asking if he had this drawn, awaiting return call.

## 2020-10-20 NOTE — PROGRESS NOTES
PTA medications updated by Medication Scribe prior to surgery via phone call with patient      -LAST DOSES ENTERED BY NURSE-    Comments:    Medication history sources: Patient, Surescripts and H&P  Medication history source reliability: Moderate  Adherence assessment: N/A Not Observed    Significant changes made to the medication list:  I have left the following medications on his PTA med list, taking box unchecked; Pt states he has temporarily discontinued taking these medications and plans on reevaluating with an MD before restarting:   Lisinopril 20mg, Potassium 10mEq, Simvastatin 20mg, Tamsulosin 0.4mg, Calcium, Vitamin D, B12, Probiotic, MVI      Additional medication history information:   None        Prior to Admission medications    Medication Sig Last Dose Taking? Auth Provider   glimepiride (AMARYL) 1 MG tablet Take 4 tablets (4 mg) by mouth every morning (before breakfast)  at AM Yes Art Cheung MD   Iron-Vitamin C (VITRON-C PO) Take 1 tablet by mouth daily  at AM Yes Reported, Patient   pantoprazole (PROTONIX) 40 MG EC tablet Take 1 tablet (40 mg) by mouth every morning (before breakfast)  at AM Yes Elvi Rashid MD   blood glucose (ACCU-CHEK UCHE PLUS) test strip 1 strip by In Vitro route daily   Art Cheung MD   blood glucose monitoring (ACCU-CHEK UCHE PLUS) meter device kit TEST BLOOD SUGAR ONE TIME DAILY  OR AS DIRECTED   Art Cheung MD   calcium-vitamin D (CALCIUM 600 + D) 600-400 MG-UNIT per tablet Take 1 tablet by mouth daily 2 WEEKS AGO  Art hCeung MD   Cholecalciferol (VITAMIN D) 2000 units tablet Take 2,000 Units by mouth daily 2 WEEKS AGO  Art Cheung MD   Cyanocobalamin (B-12 SUPER STRENGTH) 5000 MCG/ML LIQD Place 0.5 mLs under the tongue every other day FOR VITAMIN B12 SUPPLEMENTATION, PLEASE PLACE UNDER THE TONGUE 2 WEEKS AGO  Karley Irene MD   Lactobacillus (PROBIOTIC ACIDOPHILUS) CAPS Take by mouth daily  2 WEEKS AGO  Reported, Patient    lisinopril (ZESTRIL) 20 MG tablet Take 1 tablet (20 mg) by mouth daily 2 WEEKS AGO  Art Cheung MD   multivitamin w/minerals (THERA-VIT-M) tablet Take 1 tablet by mouth daily 2 WEEKS AGO  Reported, Patient   potassium citrate (UROCIT-K) 10 MEQ (1080 MG) CR tablet TAKE 1 TABLET BY MOUTH TWICE A DAY 2 WEEKS AGO  Art Cheung MD   simvastatin (ZOCOR) 20 MG tablet Take 1 tablet (20 mg) by mouth At Bedtime 2 WEEKS AGO  Art Cheung MD   tamsulosin (FLOMAX) 0.4 MG capsule Take 1 capsule (0.4 mg) by mouth daily 2 WEEKS AGO  Art Cheung MD

## 2020-10-21 ENCOUNTER — ANESTHESIA (OUTPATIENT)
Dept: SURGERY | Facility: CLINIC | Age: 70
End: 2020-10-21
Payer: COMMERCIAL

## 2020-10-21 ENCOUNTER — APPOINTMENT (OUTPATIENT)
Dept: INTERVENTIONAL RADIOLOGY/VASCULAR | Facility: CLINIC | Age: 70
End: 2020-10-21
Attending: UROLOGY
Payer: COMMERCIAL

## 2020-10-21 ENCOUNTER — HOSPITAL ENCOUNTER (OUTPATIENT)
Facility: CLINIC | Age: 70
Discharge: HOME | End: 2020-10-22
Attending: UROLOGY | Admitting: UROLOGY
Payer: COMMERCIAL

## 2020-10-21 DIAGNOSIS — N13.30 HYDRONEPHROSIS OF LEFT KIDNEY: Primary | ICD-10-CM

## 2020-10-21 DIAGNOSIS — N20.0 NEPHROLITHIASIS: ICD-10-CM

## 2020-10-21 DIAGNOSIS — D62 ANEMIA DUE TO ACUTE BLOOD LOSS: ICD-10-CM

## 2020-10-21 DIAGNOSIS — N13.30 HYDRONEPHROSIS OF LEFT KIDNEY: ICD-10-CM

## 2020-10-21 DIAGNOSIS — N20.0 STONE, KIDNEY: ICD-10-CM

## 2020-10-21 LAB
ABO + RH BLD: NORMAL
ABO + RH BLD: NORMAL
ANION GAP SERPL CALCULATED.3IONS-SCNC: 6 MMOL/L (ref 3–14)
BLD GP AB SCN SERPL QL: NORMAL
BLD PROD TYP BPU: NORMAL
BLD PROD TYP BPU: NORMAL
BLD UNIT ID BPU: 0
BLOOD BANK CMNT PATIENT-IMP: NORMAL
BLOOD PRODUCT CODE: NORMAL
BPU ID: NORMAL
BUN SERPL-MCNC: 28 MG/DL (ref 7–30)
CALCIUM SERPL-MCNC: 8.3 MG/DL (ref 8.5–10.1)
CHLORIDE SERPL-SCNC: 110 MMOL/L (ref 94–109)
CO2 SERPL-SCNC: 23 MMOL/L (ref 20–32)
CREAT SERPL-MCNC: 2.01 MG/DL (ref 0.66–1.25)
ERYTHROCYTE [DISTWIDTH] IN BLOOD BY AUTOMATED COUNT: 15.1 % (ref 10–15)
GFR SERPL CREATININE-BSD FRML MDRD: 33 ML/MIN/{1.73_M2}
GLUCOSE BLDC GLUCOMTR-MCNC: 172 MG/DL (ref 70–99)
GLUCOSE BLDC GLUCOMTR-MCNC: 211 MG/DL (ref 70–99)
GLUCOSE BLDC GLUCOMTR-MCNC: 221 MG/DL (ref 70–99)
GLUCOSE SERPL-MCNC: 167 MG/DL (ref 70–99)
GLUCOSE SERPL-MCNC: 181 MG/DL (ref 70–99)
HCT VFR BLD AUTO: 26.8 % (ref 40–53)
HGB BLD-MCNC: 7.9 G/DL (ref 13.3–17.7)
HGB BLD-MCNC: 8.5 G/DL (ref 13.3–17.7)
MCH RBC QN AUTO: 29.4 PG (ref 26.5–33)
MCHC RBC AUTO-ENTMCNC: 31.7 G/DL (ref 31.5–36.5)
MCV RBC AUTO: 93 FL (ref 78–100)
NUM BPU REQUESTED: 4
PLATELET # BLD AUTO: 219 10E9/L (ref 150–450)
POTASSIUM SERPL-SCNC: 4.6 MMOL/L (ref 3.4–5.3)
POTASSIUM SERPL-SCNC: 5.2 MMOL/L (ref 3.4–5.3)
RBC # BLD AUTO: 2.89 10E12/L (ref 4.4–5.9)
SODIUM SERPL-SCNC: 139 MMOL/L (ref 133–144)
SPECIMEN EXP DATE BLD: NORMAL
TRANSFUSION STATUS PATIENT QL: NORMAL
TRANSFUSION STATUS PATIENT QL: NORMAL
WBC # BLD AUTO: 4.5 10E9/L (ref 4–11)

## 2020-10-21 PROCEDURE — 96372 THER/PROPH/DIAG INJ SC/IM: CPT | Performed by: NURSE PRACTITIONER

## 2020-10-21 PROCEDURE — 86922 COMPATIBILITY TEST ANTIGLOB: CPT | Performed by: UROLOGY

## 2020-10-21 PROCEDURE — C1758 CATHETER, URETERAL: HCPCS | Performed by: UROLOGY

## 2020-10-21 PROCEDURE — 250N000012 HC RX MED GY IP 250 OP 636 PS 637: Performed by: NURSE PRACTITIONER

## 2020-10-21 PROCEDURE — 86901 BLOOD TYPING SEROLOGIC RH(D): CPT | Performed by: UROLOGY

## 2020-10-21 PROCEDURE — 258N000003 HC RX IP 258 OP 636: Performed by: UROLOGY

## 2020-10-21 PROCEDURE — 370N000001 HC ANESTHESIA TECHNICAL FEE, 1ST 30 MIN: Performed by: UROLOGY

## 2020-10-21 PROCEDURE — 761N000001 HC RECOVERY PHASE 1 LEVEL 1 FIRST HR: Performed by: UROLOGY

## 2020-10-21 PROCEDURE — C1726 CATH, BAL DIL, NON-VASCULAR: HCPCS | Performed by: UROLOGY

## 2020-10-21 PROCEDURE — 258N000003 HC RX IP 258 OP 636: Performed by: ANESTHESIOLOGY

## 2020-10-21 PROCEDURE — 80048 BASIC METABOLIC PNL TOTAL CA: CPT | Performed by: UROLOGY

## 2020-10-21 PROCEDURE — 272N000001 HC OR GENERAL SUPPLY STERILE: Performed by: UROLOGY

## 2020-10-21 PROCEDURE — 250N000009 HC RX 250: Performed by: NURSE ANESTHETIST, CERTIFIED REGISTERED

## 2020-10-21 PROCEDURE — 250N000013 HC RX MED GY IP 250 OP 250 PS 637: Performed by: UROLOGY

## 2020-10-21 PROCEDURE — 82365 CALCULUS SPECTROSCOPY: CPT | Performed by: UROLOGY

## 2020-10-21 PROCEDURE — 250N000011 HC RX IP 250 OP 636: Performed by: UROLOGY

## 2020-10-21 PROCEDURE — 84132 ASSAY OF SERUM POTASSIUM: CPT | Mod: 91 | Performed by: ANESTHESIOLOGY

## 2020-10-21 PROCEDURE — 74420 UROGRAPHY RTRGR +-KUB: CPT | Mod: 26 | Performed by: UROLOGY

## 2020-10-21 PROCEDURE — 50080 PERQ NL/PL LITHOTRP SMPL<2CM: CPT | Mod: LT | Performed by: UROLOGY

## 2020-10-21 PROCEDURE — 36430 TRANSFUSION BLD/BLD COMPNT: CPT

## 2020-10-21 PROCEDURE — 999N001017 HC STATISTIC GLUCOSE BY METER IP

## 2020-10-21 PROCEDURE — C1894 INTRO/SHEATH, NON-LASER: HCPCS | Performed by: UROLOGY

## 2020-10-21 PROCEDURE — 88300 SURGICAL PATH GROSS: CPT | Mod: 26 | Performed by: PATHOLOGY

## 2020-10-21 PROCEDURE — P9016 RBC LEUKOCYTES REDUCED: HCPCS | Performed by: UROLOGY

## 2020-10-21 PROCEDURE — 999N000083 IR RENAL STONE REMOVAL LEFT

## 2020-10-21 PROCEDURE — 93005 ELECTROCARDIOGRAM TRACING: CPT

## 2020-10-21 PROCEDURE — C1887 CATHETER, GUIDING: HCPCS | Performed by: UROLOGY

## 2020-10-21 PROCEDURE — 360N000026 HC SURGERY LEVEL 4 1ST 30 MIN: Performed by: UROLOGY

## 2020-10-21 PROCEDURE — 93010 ELECTROCARDIOGRAM REPORT: CPT | Performed by: INTERNAL MEDICINE

## 2020-10-21 PROCEDURE — 360N000027 HC SURGERY LEVEL 4 EA 15 ADDTL MIN: Performed by: UROLOGY

## 2020-10-21 PROCEDURE — 258N000003 HC RX IP 258 OP 636: Performed by: NURSE ANESTHETIST, CERTIFIED REGISTERED

## 2020-10-21 PROCEDURE — C1769 GUIDE WIRE: HCPCS | Performed by: UROLOGY

## 2020-10-21 PROCEDURE — 36415 COLL VENOUS BLD VENIPUNCTURE: CPT | Performed by: UROLOGY

## 2020-10-21 PROCEDURE — 85027 COMPLETE CBC AUTOMATED: CPT | Performed by: UROLOGY

## 2020-10-21 PROCEDURE — 999N000054 HC STATISTIC EKG NON-CHARGEABLE

## 2020-10-21 PROCEDURE — 86850 RBC ANTIBODY SCREEN: CPT | Performed by: UROLOGY

## 2020-10-21 PROCEDURE — 370N000002 HC ANESTHESIA TECHNICAL FEE, EACH ADDTL 15 MIN: Performed by: UROLOGY

## 2020-10-21 PROCEDURE — 99207 PR CDG-CODE CATEGORY CHANGED: CPT | Performed by: NURSE PRACTITIONER

## 2020-10-21 PROCEDURE — 250N000011 HC RX IP 250 OP 636: Performed by: NURSE ANESTHETIST, CERTIFIED REGISTERED

## 2020-10-21 PROCEDURE — 999N000139 HC STATISTIC PRE-PROCEDURE ASSESSMENT II: Performed by: UROLOGY

## 2020-10-21 PROCEDURE — 99219 PR INITIAL OBSERVATION CARE,LEVEL II: CPT | Performed by: NURSE PRACTITIONER

## 2020-10-21 PROCEDURE — 85018 HEMOGLOBIN: CPT | Mod: 91 | Performed by: ANESTHESIOLOGY

## 2020-10-21 PROCEDURE — 52352 CYSTOURETERO W/STONE REMOVE: CPT | Mod: 59 | Performed by: UROLOGY

## 2020-10-21 PROCEDURE — C2617 STENT, NON-COR, TEM W/O DEL: HCPCS | Performed by: UROLOGY

## 2020-10-21 PROCEDURE — 88300 SURGICAL PATH GROSS: CPT | Mod: TC | Performed by: UROLOGY

## 2020-10-21 PROCEDURE — 82947 ASSAY GLUCOSE BLOOD QUANT: CPT | Performed by: ANESTHESIOLOGY

## 2020-10-21 PROCEDURE — 250N000003 HC SEVOFLURANE, EA 15 MIN: Performed by: UROLOGY

## 2020-10-21 PROCEDURE — 86900 BLOOD TYPING SEROLOGIC ABO: CPT | Performed by: UROLOGY

## 2020-10-21 DEVICE — STENT URETERAL CONTOUR SOFT PERCUFLEX 6FRX24CM
Type: IMPLANTABLE DEVICE | Site: URETER | Status: NON-FUNCTIONAL
Removed: 2020-11-16

## 2020-10-21 RX ORDER — TAMSULOSIN HYDROCHLORIDE 0.4 MG/1
0.4 CAPSULE ORAL DAILY
Status: DISCONTINUED | OUTPATIENT
Start: 2020-10-22 | End: 2020-10-22 | Stop reason: HOSPADM

## 2020-10-21 RX ORDER — NICOTINE POLACRILEX 4 MG
15-30 LOZENGE BUCCAL
Status: DISCONTINUED | OUTPATIENT
Start: 2020-10-21 | End: 2020-10-22 | Stop reason: HOSPADM

## 2020-10-21 RX ORDER — SODIUM CHLORIDE, SODIUM LACTATE, POTASSIUM CHLORIDE, CALCIUM CHLORIDE 600; 310; 30; 20 MG/100ML; MG/100ML; MG/100ML; MG/100ML
INJECTION, SOLUTION INTRAVENOUS CONTINUOUS
Status: DISCONTINUED | OUTPATIENT
Start: 2020-10-21 | End: 2020-10-21 | Stop reason: HOSPADM

## 2020-10-21 RX ORDER — PROPOFOL 10 MG/ML
INJECTION, EMULSION INTRAVENOUS PRN
Status: DISCONTINUED | OUTPATIENT
Start: 2020-10-21 | End: 2020-10-21

## 2020-10-21 RX ORDER — ONDANSETRON 4 MG/1
4 TABLET, ORALLY DISINTEGRATING ORAL EVERY 30 MIN PRN
Status: DISCONTINUED | OUTPATIENT
Start: 2020-10-21 | End: 2020-10-21 | Stop reason: HOSPADM

## 2020-10-21 RX ORDER — NALOXONE HYDROCHLORIDE 0.4 MG/ML
.1-.4 INJECTION, SOLUTION INTRAMUSCULAR; INTRAVENOUS; SUBCUTANEOUS
Status: DISCONTINUED | OUTPATIENT
Start: 2020-10-21 | End: 2020-10-22 | Stop reason: HOSPADM

## 2020-10-21 RX ORDER — FENTANYL CITRATE 50 UG/ML
25-50 INJECTION, SOLUTION INTRAMUSCULAR; INTRAVENOUS
Status: DISCONTINUED | OUTPATIENT
Start: 2020-10-21 | End: 2020-10-21 | Stop reason: HOSPADM

## 2020-10-21 RX ORDER — HYDROMORPHONE HYDROCHLORIDE 1 MG/ML
.3-.5 INJECTION, SOLUTION INTRAMUSCULAR; INTRAVENOUS; SUBCUTANEOUS EVERY 5 MIN PRN
Status: DISCONTINUED | OUTPATIENT
Start: 2020-10-21 | End: 2020-10-21 | Stop reason: HOSPADM

## 2020-10-21 RX ORDER — NALOXONE HYDROCHLORIDE 0.4 MG/ML
.1-.4 INJECTION, SOLUTION INTRAMUSCULAR; INTRAVENOUS; SUBCUTANEOUS
Status: ACTIVE | OUTPATIENT
Start: 2020-10-21 | End: 2020-10-22

## 2020-10-21 RX ORDER — LIDOCAINE 40 MG/G
CREAM TOPICAL
Status: DISCONTINUED | OUTPATIENT
Start: 2020-10-21 | End: 2020-10-22 | Stop reason: HOSPADM

## 2020-10-21 RX ORDER — LIDOCAINE HYDROCHLORIDE 20 MG/ML
INJECTION, SOLUTION INFILTRATION; PERINEURAL PRN
Status: DISCONTINUED | OUTPATIENT
Start: 2020-10-21 | End: 2020-10-21

## 2020-10-21 RX ORDER — ONDANSETRON 4 MG/1
4 TABLET, ORALLY DISINTEGRATING ORAL EVERY 6 HOURS PRN
Status: DISCONTINUED | OUTPATIENT
Start: 2020-10-21 | End: 2020-10-22 | Stop reason: HOSPADM

## 2020-10-21 RX ORDER — DEXAMETHASONE SODIUM PHOSPHATE 4 MG/ML
4 INJECTION, SOLUTION INTRA-ARTICULAR; INTRALESIONAL; INTRAMUSCULAR; INTRAVENOUS; SOFT TISSUE
Status: DISCONTINUED | OUTPATIENT
Start: 2020-10-21 | End: 2020-10-21 | Stop reason: HOSPADM

## 2020-10-21 RX ORDER — SODIUM CHLORIDE 9 MG/ML
INJECTION, SOLUTION INTRAVENOUS CONTINUOUS
Status: DISCONTINUED | OUTPATIENT
Start: 2020-10-21 | End: 2020-10-22 | Stop reason: HOSPADM

## 2020-10-21 RX ORDER — DEXTROSE MONOHYDRATE 25 G/50ML
25-50 INJECTION, SOLUTION INTRAVENOUS
Status: DISCONTINUED | OUTPATIENT
Start: 2020-10-21 | End: 2020-10-22 | Stop reason: HOSPADM

## 2020-10-21 RX ORDER — PANTOPRAZOLE SODIUM 40 MG/1
40 TABLET, DELAYED RELEASE ORAL
Status: DISCONTINUED | OUTPATIENT
Start: 2020-10-22 | End: 2020-10-22 | Stop reason: HOSPADM

## 2020-10-21 RX ORDER — OXYCODONE HYDROCHLORIDE 5 MG/1
5 TABLET ORAL
Status: DISCONTINUED | OUTPATIENT
Start: 2020-10-21 | End: 2020-10-22 | Stop reason: HOSPADM

## 2020-10-21 RX ORDER — ONDANSETRON 2 MG/ML
4 INJECTION INTRAMUSCULAR; INTRAVENOUS EVERY 6 HOURS PRN
Status: DISCONTINUED | OUTPATIENT
Start: 2020-10-21 | End: 2020-10-22 | Stop reason: HOSPADM

## 2020-10-21 RX ORDER — ACETAMINOPHEN 325 MG/1
975 TABLET ORAL EVERY 6 HOURS
Status: DISCONTINUED | OUTPATIENT
Start: 2020-10-21 | End: 2020-10-22 | Stop reason: HOSPADM

## 2020-10-21 RX ORDER — CEFAZOLIN SODIUM 1 G/3ML
1 INJECTION, POWDER, FOR SOLUTION INTRAMUSCULAR; INTRAVENOUS SEE ADMIN INSTRUCTIONS
Status: DISCONTINUED | OUTPATIENT
Start: 2020-10-21 | End: 2020-10-21 | Stop reason: HOSPADM

## 2020-10-21 RX ORDER — ONDANSETRON 2 MG/ML
4 INJECTION INTRAMUSCULAR; INTRAVENOUS EVERY 30 MIN PRN
Status: DISCONTINUED | OUTPATIENT
Start: 2020-10-21 | End: 2020-10-21 | Stop reason: HOSPADM

## 2020-10-21 RX ORDER — HYDRALAZINE HYDROCHLORIDE 20 MG/ML
2.5-5 INJECTION INTRAMUSCULAR; INTRAVENOUS EVERY 10 MIN PRN
Status: DISCONTINUED | OUTPATIENT
Start: 2020-10-21 | End: 2020-10-21 | Stop reason: HOSPADM

## 2020-10-21 RX ORDER — FENTANYL CITRATE 50 UG/ML
INJECTION, SOLUTION INTRAMUSCULAR; INTRAVENOUS PRN
Status: DISCONTINUED | OUTPATIENT
Start: 2020-10-21 | End: 2020-10-21

## 2020-10-21 RX ORDER — SODIUM CHLORIDE 9 MG/ML
INJECTION, SOLUTION INTRAVENOUS CONTINUOUS PRN
Status: DISCONTINUED | OUTPATIENT
Start: 2020-10-21 | End: 2020-10-21

## 2020-10-21 RX ORDER — DEXAMETHASONE SODIUM PHOSPHATE 4 MG/ML
INJECTION, SOLUTION INTRA-ARTICULAR; INTRALESIONAL; INTRAMUSCULAR; INTRAVENOUS; SOFT TISSUE PRN
Status: DISCONTINUED | OUTPATIENT
Start: 2020-10-21 | End: 2020-10-21

## 2020-10-21 RX ORDER — LABETALOL HYDROCHLORIDE 5 MG/ML
10 INJECTION, SOLUTION INTRAVENOUS
Status: DISCONTINUED | OUTPATIENT
Start: 2020-10-21 | End: 2020-10-21 | Stop reason: HOSPADM

## 2020-10-21 RX ORDER — DOCUSATE SODIUM 100 MG/1
100 CAPSULE, LIQUID FILLED ORAL 2 TIMES DAILY
Status: DISCONTINUED | OUTPATIENT
Start: 2020-10-21 | End: 2020-10-22 | Stop reason: HOSPADM

## 2020-10-21 RX ORDER — CEFAZOLIN SODIUM 2 G/100ML
2 INJECTION, SOLUTION INTRAVENOUS
Status: COMPLETED | OUTPATIENT
Start: 2020-10-21 | End: 2020-10-21

## 2020-10-21 RX ADMIN — DEXAMETHASONE SODIUM PHOSPHATE 4 MG: 4 INJECTION, SOLUTION INTRA-ARTICULAR; INTRALESIONAL; INTRAMUSCULAR; INTRAVENOUS; SOFT TISSUE at 08:38

## 2020-10-21 RX ADMIN — DOCUSATE SODIUM 100 MG: 100 CAPSULE, LIQUID FILLED ORAL at 12:59

## 2020-10-21 RX ADMIN — INSULIN ASPART 2 UNITS: 100 INJECTION, SOLUTION INTRAVENOUS; SUBCUTANEOUS at 17:23

## 2020-10-21 RX ADMIN — PROPOFOL 150 MG: 10 INJECTION, EMULSION INTRAVENOUS at 08:33

## 2020-10-21 RX ADMIN — SODIUM CHLORIDE: 9 INJECTION, SOLUTION INTRAVENOUS at 12:29

## 2020-10-21 RX ADMIN — FENTANYL CITRATE 50 MCG: 50 INJECTION, SOLUTION INTRAMUSCULAR; INTRAVENOUS at 08:33

## 2020-10-21 RX ADMIN — ROCURONIUM BROMIDE 50 MG: 10 INJECTION INTRAVENOUS at 08:33

## 2020-10-21 RX ADMIN — SODIUM CHLORIDE: 0.9 INJECTION, SOLUTION INTRAVENOUS at 09:13

## 2020-10-21 RX ADMIN — ROCURONIUM BROMIDE 10 MG: 10 INJECTION INTRAVENOUS at 09:33

## 2020-10-21 RX ADMIN — LIDOCAINE HYDROCHLORIDE 100 MG: 20 INJECTION, SOLUTION INFILTRATION; PERINEURAL at 08:33

## 2020-10-21 RX ADMIN — DOCUSATE SODIUM 100 MG: 100 CAPSULE, LIQUID FILLED ORAL at 21:42

## 2020-10-21 RX ADMIN — PHENYLEPHRINE HYDROCHLORIDE 100 MCG: 10 INJECTION INTRAVENOUS at 08:58

## 2020-10-21 RX ADMIN — PHENYLEPHRINE HYDROCHLORIDE 0.2 MCG/KG/MIN: 10 INJECTION INTRAVENOUS at 09:11

## 2020-10-21 RX ADMIN — CEFAZOLIN SODIUM 2 G: 2 INJECTION, SOLUTION INTRAVENOUS at 08:37

## 2020-10-21 RX ADMIN — SODIUM CHLORIDE, POTASSIUM CHLORIDE, SODIUM LACTATE AND CALCIUM CHLORIDE: 600; 310; 30; 20 INJECTION, SOLUTION INTRAVENOUS at 06:40

## 2020-10-21 RX ADMIN — HYDROMORPHONE HYDROCHLORIDE 0.5 MG: 1 INJECTION, SOLUTION INTRAMUSCULAR; INTRAVENOUS; SUBCUTANEOUS at 09:47

## 2020-10-21 RX ADMIN — FENTANYL CITRATE 50 MCG: 50 INJECTION, SOLUTION INTRAMUSCULAR; INTRAVENOUS at 08:58

## 2020-10-21 RX ADMIN — MIDAZOLAM 2 MG: 1 INJECTION INTRAMUSCULAR; INTRAVENOUS at 08:25

## 2020-10-21 RX ADMIN — ACETAMINOPHEN 975 MG: 325 TABLET, FILM COATED ORAL at 12:59

## 2020-10-21 RX ADMIN — SODIUM CHLORIDE, POTASSIUM CHLORIDE, SODIUM LACTATE AND CALCIUM CHLORIDE: 600; 310; 30; 20 INJECTION, SOLUTION INTRAVENOUS at 08:25

## 2020-10-21 RX ADMIN — PHENYLEPHRINE HYDROCHLORIDE 100 MCG: 10 INJECTION INTRAVENOUS at 08:55

## 2020-10-21 RX ADMIN — SUGAMMADEX 200 MG: 100 INJECTION, SOLUTION INTRAVENOUS at 10:38

## 2020-10-21 ASSESSMENT — MIFFLIN-ST. JEOR: SCORE: 1597.26

## 2020-10-21 NOTE — ANESTHESIA PROCEDURE NOTES
Airway   Date/Time: 10/21/2020 8:35 AM   Patient location during procedure: OR    Staff -   Anesthesiologist:  Justine Arana MD  CRNA: Rajni Fischer APRN CRNA  Other Anesthesia Staff: Anderson Gonzalez  Performed By: SRNA    Consent for Airway   Urgency: elective    Indications and Patient Condition  Indications for airway management: dre-procedural  Induction type:intravenousMask difficulty assessment: 2 - vent by mask + OA or adjuvant +/- NMBA    Final Airway Details  Final airway type: endotracheal airway  Successful airway:ETT - single  Endotracheal Airway Details   ETT size (mm): 8.0  Successful intubation technique: video laryngoscopy  Grade View of Cords: 1  Adjucts: stylet  Measured from: lips  Secured at (cm): 22  Secured with: pink tape  Bite block used: Soft    Post intubation assessment   Placement verified by: capnometry, equal breath sounds and chest rise   Number of attempts at approach: 1  Number of other approaches attempted: 0  Secured with:pink tape  Ease of procedure: easy  Dentition: Intact and Unchanged

## 2020-10-21 NOTE — CONSULTS
[facility]  Consult Note - Hospitalist Service     Date of Admission:  10/21/2020  Consult Requested by: Dr. Le  Reason for Consult: Medical Management     Assessment & Plan   Los Renee is a 70 year old male admitted on 10/21/2020. He presents for elective cystoscopy, left pyelogram, left ureteroscopy, left JJ stent placement, left PCNL greater than 2 cm stone burden. PMH includes recent admission for upper GI bleed with acute blood loss anemia secondary to gastrojejunal anastomosis ulceration, history of GI bleed due to anastomotic ulcer in November 2012, diabetes mellitus type 2, dyslipidemia, bilateral nephrolithiasis.     Hydronephrosis of the left kidney, nephrolithiasis s/p elective cystoscopy, left pyelogram, left ureteroscopy, left JJ stent placement, left PCNL greater than 2 cm stone burden, POD #0  Hx of ESWL and ureteroscopy, 2016  Surgery completed under general anesthesia, no surgical complications noted intraoperatively.   mL, the patient received 1800 cc crystalloid intraoperatively, and 1 unit of PRBC.  --Defer all postoperative cares to primary service    Diabetes mellitus type 2, uncontrolled  DM complicated by retinopathy, and Diabetic nephropathy  Most recent hemoglobin A1c 9.1% in August 2020.  PTA regimen is glimepiride was increased due to his primary care provider recommendations.  --Hold long-acting in the immediate postoperative phase  --Previous admission (9/2020) patient required prandial insulin 1 unit per 10 g carbs 3 times a day with meals, will hold until appetite improves.  --lisinopril on hold     Recent admission for probable upper GI bleed  Admitted to [facility] on 9/29/20 4 probable upper GI bleed with acute blood loss anemia.  The patient was evaluated by [facility] and underwent EGD 9/29 and was found to have gastrojejunal anastomosis ulceration, biopsies were obtained gastric bypass area showed intact to staple line.  Preoperative hemoglobin 10/21  7.9, post operative Hgb 8.5. Patient notes he had several black stools after discharge, but did eventually transition to brown, and clinically patient felt improved.   --Resume omeprazole p.o.  --No pathology results from [facility] per patient  --monitor for further melena  --CBC tomorrow AM     Elevated creatinine  Creatinine in August 2020 was 2.2, this has slowly trended down and may be stress related to his nephrolithiasis.  Prior to this, his creatinine ranged 1.1-1.4.  Preoperative creatinine 2.01, potassium 5.2 preop.  --Gentle hydration postoperatively  --BMP tomorrow a.m.    Hyperlipidemia  -Continue prior to admission simvastatin    The patient's care was discussed with the Attending Physician, Dr. Miguel Osman, Bedside Nurse and Patient.    ROMEO Candelario [facility]    ______________________________________________________________________    Chief Complaint   Elective urological surgery    History is obtained from the patient    History of Present Illness   Los Renee is a 70 year old male who presents for elective cystoscopy, left pyelogram, left ureteroscopy, left JJ stent placement, left PCNL greater than 2 cm stone burden. PMH includes recent admission for upper GI bleed with acute blood loss anemia secondary to gastrojejunal anastomosis ulceration, history of GI bleed due to anastomotic ulcer in November 2012, diabetes mellitus type 2, dyslipidemia, bilateral nephrolithiasis.     Patient is postop day 0 from the above surgeries completed under general anesthesia, no surgical complications noted intraoperatively.   mL, the patient received 1800 cc crystalloid intraoperatively, and 1 unit of PRBC.    I evaluated the patient in the immediate postoperative period,he denies any intractable pain, PONV. Since discharge, the pt denies any chest pain, dyspnea, fever/chills, nausea/vomiting, syncope, urinary symptoms. He endorses ongoing diarrhea (has been chronic  and thought to be related to his metformin).     Review of Systems   The 10 point Review of Systems is negative other than noted in the HPI or here.     Past Medical History    I have reviewed this patient's medical history and updated it with pertinent information if needed.   Past Medical History:   Diagnosis Date     Arthritis      Gastric bypass status for obesity      Gastro-oesophageal reflux disease      GI bleed 11/1/2012     Intermittent asthma     related to allergies- rare symptoms     Mixed hyperlipidemia 1/7/2008     Morbid obesity (H) 01/24/2008    s/p gastric bypass surgery     Morbid obesity (H) 1/24/2008     Mumps     in the early 60's     Nephrolithiasis      SOLITARIO (obstructive sleep apnea) 2010          Other chronic pain     chronic left knee pain     Spider veins      Swelling of testicle      Type 2 diabetes mellitus (H) 01/07/2008     Unspecified essential hypertension 1/7/2008    resolved since gastric surgery       Past Surgical History   I have reviewed this patient's surgical history and updated it with pertinent information if needed.  Past Surgical History:   Procedure Laterality Date     COLONOSCOPY N/A 7/5/2018    Procedure: COMBINED COLONOSCOPY, SINGLE OR MULTIPLE BIOPSY/POLYPECTOMY BY BIOPSY;  colonoscopy;  Surgeon: Max Santizo MD;  Location:  GI     CYSTOSCOPY       ESOPHAGOSCOPY, GASTROSCOPY, DUODENOSCOPY (EGD), COMBINED N/A 9/30/2020    Procedure: ESOPHAGOGASTRODUODENOSCOPY, WITH BIOPSY;  Surgeon: Neda Wheeler MD;  Location:  GI     EXTRACORPOREAL SHOCK WAVE LITHOTRIPSY, CYSTOSCOPY, INSERT STENT URETER(S), COMBINED Right 6/1/2015    Procedure: COMBINED EXTRACORPOREAL SHOCK WAVE LITHOTRIPSY, CYSTOSCOPY, INSERT STENT URETER(S);  Surgeon: Jeovanny Ricketts MD;  Location:  OR     KNEE SURGERY       LAPAROSCOPIC BYPASS GASTRIC       LASER HOLMIUM LITHOTRIPSY URETER(S), INSERT STENT, COMBINED Right 11/3/2016    Procedure: COMBINED CYSTOSCOPY, URETEROSCOPY,  LASER HOLMIUM LITHOTRIPSY URETER(S), INSERT STENT;  Surgeon: Neli Freitas MD;  Location:  OR     ORTHOPEDIC SURGERY      bilat knee replaCEMENTS     VASECTOMY         Social History   I have reviewed this patient's social history and updated it with pertinent information if needed.  Social History     Tobacco Use     Smoking status: Never Smoker     Smokeless tobacco: Never Used   Substance Use Topics     Alcohol use: No     Alcohol/week: 0.0 standard drinks     Drug use: No       Family History   I have reviewed this patient's family history and updated it with pertinent information if needed.   Family History   Problem Relation Age of Onset     C.A.D. Mother      Diabetes Mother      Breast Cancer Mother      Cerebrovascular Disease Mother      Alcohol/Drug Mother      Eye Disorder Mother      Heart Disease Mother      C.A.D. Father      Diabetes Father      Alcohol/Drug Father      Leukemia Father        Medications   I have reviewed this patient's current medications    Allergies   Allergies   Allergen Reactions     Simvastatin Nausea       Physical Exam   Vital Signs: Temp: 97.6  F (36.4  C) Temp src: Oral BP: 119/66 Pulse: 80   Resp: 18 SpO2: 95 % O2 Device: Nasal cannula Oxygen Delivery: 1 LPM  Weight: 188 lbs 0 oz     Constitutional: awake, alert, cooperative, no apparent distress, and appears stated age  Eyes: pupils equal, round and reactive to light and extra-ocular muscles intact  Respiratory: No increased work of breathing, good air exchange, clear to auscultation bilaterally, no crackles or wheezing  Cardiovascular: regular rate and rhythm, normal S1 and S2, no murmur noted and no edema  GI: soft, non tender, non distended   Skin: warm/dry. Left flank incision is covered, dressing is clean/dry and intact.   Musculoskeletal: motor strength is 5 out of 5 all extremities bilaterally  Neuropsychiatric: General: normal, calm and normal eye contact  Level of consciousness: alert /  normal  Affect: normal    Data   Results for orders placed or performed during the hospital encounter of 10/21/20 (from the past 24 hour(s))   ABO/Rh type and screen   Result Value Ref Range    Units Ordered 4     ABO O     RH(D) Neg     Antibody Screen Neg     Test Valid Only At [facility]        Specimen Expires 10/24/2020     Crossmatch Red Blood Cells    Hemoglobin   Result Value Ref Range    Hemoglobin 7.9 (L) 13.3 - 17.7 g/dL   Potassium   Result Value Ref Range    Potassium 4.6 3.4 - 5.3 mmol/L   Glucose   Result Value Ref Range    Glucose 167 (H) 70 - 99 mg/dL   Blood component   Result Value Ref Range    Unit Number [id number]     Blood Component Type Red Blood Cells LeukoReduced (Part 2)     Division Number 00     Status of Unit Released to care unit 10/21/2020 0857     Blood Product Code V6680I04     Unit Status ISS    Blood component   Result Value Ref Range    Unit Number [id number]     Blood Component Type Red Blood Cells LeukoReduced (Part 2)     Division Number 00     Status of Unit Ready for patient 10/21/2020 0805     Blood Product Code T5480V93     Unit Status ALEKSANDRA    Blood component   Result Value Ref Range    Unit Number [id number]     Blood Component Type Red Blood Cells LeukoReduced (Part 2)     Division Number 00     Status of Unit Ready for patient 10/21/2020 0915     Blood Product Code [id number]     Unit Status ALEKSANDRA    Blood component   Result Value Ref Range    Unit Number [id number]     Blood Component Type Red Blood Cells Leukocyte Reduced     Division Number 00     Status of Unit Ready for patient 10/21/2020 0915     Blood Product Code M7632A02     Unit Status ALEKSANDRA    EKG 12-lead, tracing only   Result Value Ref Range    Interpretation ECG Click View Image link to view waveform and result    Glucose by meter   Result Value Ref Range    Glucose 172 (H) 70 - 99 mg/dL   Basic metabolic panel   Result Value Ref Range    Sodium 139 133 - 144 mmol/L    Potassium 5.2  3.4 - 5.3 mmol/L    Chloride 110 (H) 94 - 109 mmol/L    Carbon Dioxide 23 20 - 32 mmol/L    Anion Gap 6 3 - 14 mmol/L    Glucose 181 (H) 70 - 99 mg/dL    Urea Nitrogen 28 7 - 30 mg/dL    Creatinine 2.01 (H) 0.66 - 1.25 mg/dL    GFR Estimate 33 (L) >60 mL/min/[1.73_m2]    GFR Estimate If Black 38 (L) >60 mL/min/[1.73_m2]    Calcium 8.3 (L) 8.5 - 10.1 mg/dL   CBC with platelets   Result Value Ref Range    WBC 4.5 4.0 - 11.0 10e9/L    RBC Count 2.89 (L) 4.4 - 5.9 10e12/L    Hemoglobin 8.5 (L) 13.3 - 17.7 g/dL    Hematocrit 26.8 (L) 40.0 - 53.0 %    MCV 93 78 - 100 fl    MCH 29.4 26.5 - 33.0 pg    MCHC 31.7 31.5 - 36.5 g/dL    RDW 15.1 (H) 10.0 - 15.0 %    Platelet Count 219 150 - 450 10e9/L

## 2020-10-21 NOTE — ANESTHESIA CARE TRANSFER NOTE
Patient: Los Renee    Procedure(s):  LEFT PERCUTANEOUS NEPHROLITHOTOMY,  CYSTOSCOPY, LEFT RETROGRADE PYELOGRAM, LEFT URETEROSCOPY,LEFT URETERAL STENT PLACEMENT    Diagnosis: Hydronephrosis of left kidney [N13.30]  Nephrolithiasis [N20.0]  Diagnosis Additional Information: No value filed.    Anesthesia Type:   General     Note:  Airway :Face Mask  Patient transferred to:PACU  Comments: To PACU: Arouses easily, good airway, 02 face mask, VSS  Report to RNHandoff Report: Identifed the Patient, Identified the Reponsible Provider, Reviewed the pertinent medical history, Discussed the surgical course, Reviewed Intra-OP anesthesia mangement and issues during anesthesia, Set expectations for post-procedure period and Allowed opportunity for questions and acknowledgement of understanding      Vitals: (Last set prior to Anesthesia Care Transfer)    CRNA VITALS  10/21/2020 1015 - 10/21/2020 1049      10/21/2020             Pulse:  80    SpO2:  100 %    Resp Rate (set):  10                Electronically Signed By: ROMEO Vásquez CRNA  October 21, 2020  10:49 AM

## 2020-10-21 NOTE — OP NOTE
OPERATIVE REPORT  DATE OF SURGERY: 10/21/20  LOCATION OF SURGERY: [facility]  PREOPERATIVE DIAGNOSIS:  (N13.30) Hydronephrosis of left kidney  (primary encounter diagnosis)  (N20.0) Nephrolithiasis  (N20.0) Stone, kidney  (D62) Anemia due to acute blood loss  POSTOPERATIVE DIAGNOSIS: (N13.30) Hydronephrosis of left kidney  (primary encounter diagnosis)  (N20.0) Nephrolithiasis  (N20.0) Stone, kidney  (D62) Anemia due to acute blood loss    START TIME: 9:00 AM  END TIME: 10:29 AM  PROCEDURE PERFORMED:   1. Cystoscopy  2. LEFT retrograde pyelogram  3. LEFT ureteroscopy with basketing of stones  4. LEFT PCNL >2cm stone burden  5. LEFT JJ stent placement  6. >1hr physician fluoroscopy time      STAFF SURGEON: Asher Le MD  ASSISTANT: Yo Kirby MD  ANESTHESIA: General.   ESTIMATED BLOOD LOSS: 100 mL.   DRAINS AND TUBES: 6fr x 24cm ureteral stent, 18fr coude catheter  COMPLICATIONS: None.   DISPOSITION: PACU.   SPECIMENS OBTAINED:   ID Type Source Tests Collected by Time Destination   1 : Left kidney stones Calculus/Stone Kidney, Left STONE ANALYSIS Asher Le MD 10/21/2020 10:30 AM      SIGNIFICANT FINDINGS: Cystoscopy with no evidence of stone in the bladder.  Left retrograde pyelogram with no evidence of stone in the ureter and evidence of the obstructing left UPJ stone.  Left ureteroscopy with evidence of the large UPJ stone as well as a large stone burden in the lower pole.  Direct percutaneous access obtained under visualization with balloon dilation of the tract.  Percutaneous nephrolithotomy with removal of all significant stone burden.  Ureteroscopy with no further evidence of stone fragments in the ureter.  Ureteral stent placed.     HISTORY OF PRESENT ILLNESS: Los Renee is a 70 year old man who presented for evaluation of bilateral nephrolithiasis.  He had prior history of ESWL and ureteroscopy with last intervention in 2016.  He was noted to have interval increase of his serum creatinine  over the summer from a baseline of 1.1-1 0.4-2.0.  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  We discussed treatment options and he elects to proceed with the above surgery.  In the intervening time he developed acute blood loss anemia from an upper GI bleed which required hospitalization, GI intervention and clipping, and transfusion of blood products.  He was discharged from the hospital on 10/1/2020.  His hemoglobin was stable with last check yesterday at 8.1.  Multidisciplinary discussion was had with patient's primary care provider and given his renal dysfunction decision made to proceed.    OPERATION PERFORMED:   Informed consent was obtained and the patient was brought to the operating room where general anesthesia was induced. The patient was given appropriate preoperative antibiotics and positioned prone. We then performed a timeout, verifying the correct patient's site and procedure to be performed.    18 Portuguese coudé catheter was placed in the bladder and the bladder was drained.  The catheter was removed.  Flexible cystoscope was inserted atraumatically into the bladder.  The left ureteral orifice was identified and cannulated with a 0.035 sensor wire which was advanced into the renal pelvis under fluoroscopic guidance and the cystoscope was removed.  A dual-lumen catheter was advanced to the mid ureter and a gentle retrograde pyelogram was performed with no evidence of stone in the ureter and evidence of the stone at the left UPJ.  Amplatz Super Stiff wire was advanced into the renal pelvis and the dual-lumen catheter was removed.  An 11/13 x 46cm ureteral access sheath was advanced up the ureter to the level of the UPJ and the Super Stiff wire and the inner stylette were removed.  Flexible ureteroscope was assembled and advanced up to the renal pelvis where the large UPJ stone  was encountered.  The collecting system was fully evaluated with evidence of the stone collection in the lower pole and this was deemed to be an excellent point for access.  Dr. Payton then performed percutaneous access under direct visualization and we obtained through and through access.  Dr. Payton then advanced the balloon under direct visualization and dilated the tract and advanced the sheath.  Please see his separate dictation.  I then performed percutaneous nephrolithotomy of the large 2 cm lower pole partial staghorn stone as well as the large renal pelvis stone.  There were several smaller stone fragments which were also removed.  The stone was removed both using the cyber wand as well as the red handled basket.  Flexible pyeloscopy was performed with further removal of stone fragments utilizing a halo basket.  Flexible ureteroscopy was performed with further removal of stones from the UPJ using the halo basket.  Following clearance of all of the stone the ureteral access sheath was removed under direct visualization and the entire ureter was visualized with no stones in the ureter.  A 6 Mohawk by 24 cm JJ ureteral stent was advanced antegrade with good curl noted in the bladder fluoroscopically and in the renal pelvis on direct visualization.  The sensor wire was removed.  The percutaneous access sheath was removed and pressure was held for 2 minutes.  No bleeding was noted following release of pressure and a 2-0 Prolene horizontal mattress suture was placed.  18 Mohawk coudé catheter was replaced within the bladder.  Patient remained hemodynamically stable throughout the case, however given his low starting hemoglobin and discussion with anesthesia patient was transfused 1 unit of packed red blood cells toward the end of the case.  He was emerged from anesthesia and taken to the recovery room in stable condition.    Chart documentation with Dragon Voice recognition Software. Although reviewed after  completion, some words and grammatical errors may remain.     Asher Le MD   Urology  [facility]  Clinic Phone [phone]

## 2020-10-21 NOTE — PLAN OF CARE
Pt arrived from PACU around 1200. No c/o pain, scheduled Tylenol. Tolerating clear liquids. Weaned to RA. BS hypoactive, no flatus yet. Small incision on back, dressing is CDI. Gilmore in place, urine is dark pink. Has not gotten OOB yet.

## 2020-10-21 NOTE — ANESTHESIA PREPROCEDURE EVALUATION
Anesthesia Pre-Procedure Evaluation    Patient: Los Renee   MRN: [id number] : 1950          Preoperative Diagnosis: Hydronephrosis of left kidney [N13.30]  Nephrolithiasis [N20.0]    Procedure(s):  LEFT PERCUTANEOUS NEPHROLITHOTOMY,  CYSTOSCOPY, LEFT RETROGRADE PYELOGRAM, LEFT URETEROSCOPY,LEFT URETERAL STENT PLACEMENT    Past Medical History:   Diagnosis Date     Arthritis      Gastric bypass status for obesity      Gastro-oesophageal reflux disease      GI bleed 2012     Intermittent asthma     related to allergies- rare symptoms     Mixed hyperlipidemia 2008     Morbid obesity (H) 2008    s/p gastric bypass surgery     Morbid obesity (H) 2008     Mumps     in the early      Nephrolithiasis      SOLITARIO (obstructive sleep apnea)           Other chronic pain     chronic left knee pain     Spider veins      Swelling of testicle      Type 2 diabetes mellitus (H) 2008     Unspecified essential hypertension 2008    resolved since gastric surgery     Past Surgical History:   Procedure Laterality Date     COLONOSCOPY N/A 2018    Procedure: COMBINED COLONOSCOPY, SINGLE OR MULTIPLE BIOPSY/POLYPECTOMY BY BIOPSY;  colonoscopy;  Surgeon: Max Santizo MD;  Location:  GI     CYSTOSCOPY       ESOPHAGOSCOPY, GASTROSCOPY, DUODENOSCOPY (EGD), COMBINED N/A 2020    Procedure: ESOPHAGOGASTRODUODENOSCOPY, WITH BIOPSY;  Surgeon: Neda Wheeler MD;  Location:  GI     EXTRACORPOREAL SHOCK WAVE LITHOTRIPSY, CYSTOSCOPY, INSERT STENT URETER(S), COMBINED Right 2015    Procedure: COMBINED EXTRACORPOREAL SHOCK WAVE LITHOTRIPSY, CYSTOSCOPY, INSERT STENT URETER(S);  Surgeon: Jeovanny Ricketts MD;  Location:  OR     KNEE SURGERY       LAPAROSCOPIC BYPASS GASTRIC       LASER HOLMIUM LITHOTRIPSY URETER(S), INSERT STENT, COMBINED Right 11/3/2016    Procedure: COMBINED CYSTOSCOPY, URETEROSCOPY, LASER HOLMIUM LITHOTRIPSY URETER(S), INSERT STENT;  Surgeon: Fortino  Neli Epstein MD;  Location:  OR     ORTHOPEDIC SURGERY      bilat knee replaCEMENTS     VASECTOMY         Anesthesia Evaluation     . Pt has had prior anesthetic.     No history of anesthetic complications          ROS/MED HX    ENT/Pulmonary:     (+)sleep apnea, , . .    Neurologic:  - neg neurologic ROS     Cardiovascular:     (+) Dyslipidemia, hypertension----. : . . . :. .       METS/Exercise Tolerance:     Hematologic:     (+) Anemia, -      Musculoskeletal:         GI/Hepatic:     (+) GERD Asymptomatic on medication, Other GI/Hepatic GI bleed, gastric bypass      Renal/Genitourinary:     (+) chronic renal disease, type: ARF and CRI, Nephrolithiasis ,       Endo:     (+) type II DM Diabetic complications: retinopathy, Obesity, .      Psychiatric:         Infectious Disease:         Malignancy:         Other:                                 Lab Results   Component Value Date    WBC 4.3 10/20/2020    HGB 8.1 (L) 10/20/2020    HCT 26.0 (L) 10/20/2020     10/20/2020     10/20/2020    POTASSIUM 4.7 10/20/2020    CHLORIDE 106 10/20/2020    CO2 25 10/20/2020    BUN 27 10/20/2020    CR 2.16 (H) 10/20/2020     (H) 10/20/2020    CIARRA 8.7 10/20/2020    MAG 2.1 11/08/2016    ALBUMIN 3.1 (L) 09/29/2020    PROTTOTAL 6.6 (L) 09/29/2020    ALT 24 09/29/2020    AST 12 09/29/2020    ALKPHOS 77 09/29/2020    BILITOTAL 0.3 09/29/2020    PTT 20 (L) 09/29/2020    INR 1.20 (H) 09/29/2020    TSH 2.84 01/31/2017    T4 0.82 01/31/2017       Preop Vitals  BP Readings from Last 3 Encounters:   10/21/20 (!) 142/71   10/12/20 110/58   10/01/20 122/60    Pulse Readings from Last 3 Encounters:   10/12/20 94   10/01/20 88   09/29/20 107      Resp Readings from Last 3 Encounters:   10/21/20 18   10/01/20 18   09/29/20 16    SpO2 Readings from Last 3 Encounters:   10/21/20 99%   10/12/20 99%   10/01/20 100%      Temp Readings from Last 1 Encounters:   10/21/20 36.4  C (97.6  F) (Temporal)    Ht Readings from Last 1  "Encounters:   10/21/20 1.727 m (5' 8\")      Wt Readings from Last 1 Encounters:   10/21/20 85.3 kg (188 lb)    Estimated body mass index is 28.59 kg/m  as calculated from the following:    Height as of this encounter: 1.727 m (5' 8\").    Weight as of this encounter: 85.3 kg (188 lb).       Anesthesia Plan      History & Physical Review  History and physical reviewed and following examination; no interval change.    ASA Status:  3 .    NPO Status:  > 8 hours    Plan for General with Intravenous and Propofol induction. Maintenance will be Balanced.    PONV prophylaxis:  Ondansetron (or other 5HT-3)         Postoperative Care  Postoperative pain management:  Multi-modal analgesia.      Consents  Anesthetic plan, risks, benefits and alternatives discussed with:  Patient..                 Justine Arana MD  "

## 2020-10-21 NOTE — ANESTHESIA POSTPROCEDURE EVALUATION
Patient: Los Renee    Procedure(s):  LEFT PERCUTANEOUS NEPHROLITHOTOMY,  CYSTOSCOPY, LEFT RETROGRADE PYELOGRAM, LEFT URETEROSCOPY,LEFT URETERAL STENT PLACEMENT    Diagnosis:Hydronephrosis of left kidney [N13.30]  Nephrolithiasis [N20.0]  Diagnosis Additional Information: No value filed.    Anesthesia Type:  General    Note:  Anesthesia Post Evaluation    Patient location during evaluation: PACU  Patient participation: Able to fully participate in evaluation  Level of consciousness: awake and alert  Pain management: adequate  Airway patency: patent  Cardiovascular status: acceptable  Respiratory status: acceptable and unassisted  Hydration status: acceptable  PONV: none             Last vitals:  Vitals:    10/21/20 1047 10/21/20 1050 10/21/20 1100   BP: 123/61 119/66 114/61   Pulse: 75 78 76   Resp: 20 18 16   Temp:  36.8  C (98.2  F)    SpO2: 100% 100% 98%         Electronically Signed By: Justine Arana MD  October 21, 2020  11:15 AM

## 2020-10-21 NOTE — OR NURSING
1122hr - [facility] Arana updated on pts stable status; Pt awake intermittently sleeping on/off; VS & Surgical sites are stable. Collected Hgb lab result presently pending.  Okay for Pt to transfer out of PACU per [facility] Sumit.

## 2020-10-22 VITALS
RESPIRATION RATE: 16 BRPM | OXYGEN SATURATION: 95 % | HEIGHT: 68 IN | BODY MASS INDEX: 28.49 KG/M2 | DIASTOLIC BLOOD PRESSURE: 59 MMHG | SYSTOLIC BLOOD PRESSURE: 116 MMHG | TEMPERATURE: 97.5 F | WEIGHT: 188 LBS | HEART RATE: 84 BPM

## 2020-10-22 LAB
ANION GAP SERPL CALCULATED.3IONS-SCNC: 4 MMOL/L (ref 3–14)
BLD PROD TYP BPU: NORMAL
BLD UNIT ID BPU: 0
BLOOD PRODUCT CODE: NORMAL
BPU ID: NORMAL
BUN SERPL-MCNC: 22 MG/DL (ref 7–30)
CALCIUM SERPL-MCNC: 8 MG/DL (ref 8.5–10.1)
CHLORIDE SERPL-SCNC: 112 MMOL/L (ref 94–109)
CO2 SERPL-SCNC: 25 MMOL/L (ref 20–32)
COPATH REPORT: NORMAL
CREAT SERPL-MCNC: 1.86 MG/DL (ref 0.66–1.25)
ERYTHROCYTE [DISTWIDTH] IN BLOOD BY AUTOMATED COUNT: 15.5 % (ref 10–15)
GFR SERPL CREATININE-BSD FRML MDRD: 36 ML/MIN/{1.73_M2}
GLUCOSE BLDC GLUCOMTR-MCNC: 131 MG/DL (ref 70–99)
GLUCOSE BLDC GLUCOMTR-MCNC: 159 MG/DL (ref 70–99)
GLUCOSE SERPL-MCNC: 141 MG/DL (ref 70–99)
HCT VFR BLD AUTO: 25.4 % (ref 40–53)
HGB BLD-MCNC: 7.9 G/DL (ref 13.3–17.7)
MCH RBC QN AUTO: 29 PG (ref 26.5–33)
MCHC RBC AUTO-ENTMCNC: 31.1 G/DL (ref 31.5–36.5)
MCV RBC AUTO: 93 FL (ref 78–100)
PLATELET # BLD AUTO: 221 10E9/L (ref 150–450)
POTASSIUM SERPL-SCNC: 4.4 MMOL/L (ref 3.4–5.3)
RBC # BLD AUTO: 2.72 10E12/L (ref 4.4–5.9)
SODIUM SERPL-SCNC: 141 MMOL/L (ref 133–144)
TRANSFUSION STATUS PATIENT QL: NORMAL
WBC # BLD AUTO: 4.6 10E9/L (ref 4–11)

## 2020-10-22 PROCEDURE — 36415 COLL VENOUS BLD VENIPUNCTURE: CPT | Performed by: UROLOGY

## 2020-10-22 PROCEDURE — 85027 COMPLETE CBC AUTOMATED: CPT | Performed by: UROLOGY

## 2020-10-22 PROCEDURE — 250N000013 HC RX MED GY IP 250 OP 250 PS 637: Performed by: UROLOGY

## 2020-10-22 PROCEDURE — 80048 BASIC METABOLIC PNL TOTAL CA: CPT | Performed by: UROLOGY

## 2020-10-22 PROCEDURE — 999N001017 HC STATISTIC GLUCOSE BY METER IP

## 2020-10-22 PROCEDURE — 258N000003 HC RX IP 258 OP 636: Performed by: UROLOGY

## 2020-10-22 PROCEDURE — 99217 PR OBSERVATION CARE DISCHARGE: CPT | Performed by: HOSPITALIST

## 2020-10-22 RX ADMIN — PANTOPRAZOLE SODIUM 40 MG: 40 TABLET, DELAYED RELEASE ORAL at 06:39

## 2020-10-22 RX ADMIN — TAMSULOSIN HYDROCHLORIDE 0.4 MG: 0.4 CAPSULE ORAL at 08:16

## 2020-10-22 RX ADMIN — SODIUM CHLORIDE: 9 INJECTION, SOLUTION INTRAVENOUS at 05:29

## 2020-10-22 RX ADMIN — DOCUSATE SODIUM 100 MG: 100 CAPSULE, LIQUID FILLED ORAL at 08:16

## 2020-10-22 NOTE — PROGRESS NOTES
Gilmore removed. Voiding without difficulty. PVR 34. Criteria met for discharge. AVS reviewed with patient. No medications filled or sent with patient. All belongings/valuables with patient at time of departure. Patient left unit at about 1220. Patient's wife to transport patient home. Patient aware to follow up with Dr. Le in 4 weeks.

## 2020-10-22 NOTE — PLAN OF CARE
POD 1. VSS on RA. Capno WDL. Denied pain, refused schedule tylenol. BS active, +flatus. Gilmore in place with pink output. Incision to left mid back- c/d/I. Up independently. Possible discharge today. Continue to monitor.

## 2020-10-22 NOTE — DISCHARGE SUMMARY
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      Hospital Course     Los Renee is a 70 year old male admitted on 10/21/2020. He  underwent elective cystoscopy, left pyelogram, left ureteroscopy, left JJ stent placement, left PCNL greater than 2 cm stone burden. PMH includes recent admission for upper GI bleed with acute blood loss anemia secondary to gastrojejunal anastomosis ulceration, history of GI bleed due to anastomotic ulcer in November 2012, diabetes mellitus type 2, dyslipidemia, bilateral nephrolithiasis.      Hydronephrosis of the left kidney, nephrolithiasis s/p elective cystoscopy, left pyelogram, left ureteroscopy, left JJ stent placement, left PCNL greater than 2 cm stone burden, POD #0  Hx of ESWL and ureteroscopy, 2016  Surgery completed under general anesthesia, no surgical complications noted intraoperatively.   mL, the patient received 1800 cc crystalloid intraoperatively, and 1 unit of PRBC.  --Defer all postoperative cares to primary service  --Gilmore catheter discontinued.  Patient able to void on his own.  Discharged home in stable condition.  Follow-up with urology in 4 weeks.     Diabetes mellitus type 2, uncontrolled  DM complicated by retinopathy, and Diabetic nephropathy  Most recent hemoglobin A1c 9.1% in August 2020.  PTA regimen is glimepiride was increased due to his primary care  provider recommendations.  --Hold glimepiride in the immediate postoperative phase, placed on sliding scale insulin  --lisinopril on hold   --All home meds resumed at discharge.     Recent admission for probable upper GI bleed with acute blood loss anemia  Admitted to [facility] on 9/29/20 4 probable upper GI bleed with acute blood loss anemia.  The patient was evaluated by [facility] and underwent EGD 9/29 and was found to have gastrojejunal anastomosis ulceration, biopsies were obtained gastric bypass area showed intact to staple line.  Preoperative hemoglobin 10/21 7.9, post operative Hgb 8.5 and then 7.9 on the following day.    --Resume omeprazole p.o.  --Biopsy result from recent EGD reviewed with patient, no malignancy or infection  --monitor for further melena  --Hemoglobin has remained stable at recent baseline.  Follow-up with PCP.  Continue oral iron supplement.     CKD stage III  Creatinine in August 2020 was 2.2, this has slowly trended down and may be stress related to his nephrolithiasis.  Prior to this, his creatinine ranged 1.1-1.4.  Preoperative creatinine 2.01, potassium 5.2 preop.  --Gentle hydration postoperatively  --Creatinine improved to 1.8 on day of discharge     Hyperlipidemia  -Continue prior to admission simvastatin      Sandie Urbina MD, MD      Pending Results   These results will be followed up by Hospitalist team.  Unresulted Labs Ordered in the Past 30 Days of this Admission     Date and Time Order Name Status Description    10/21/2020 1032 Stone analysis In process           Code Status   Full Code       Primary Care Physician   Art Cheung    Follow-ups Needed After Discharge   Follow-up Appointments     Follow-up and recommended labs and tests       Follow up with primary care provider, Art Cheung, within 7 days   for hospital follow- up.  No follow up labs or test are needed.  Follow up with Dr Le in 4 weeks, Urology             Physical Exam   Temp: 97.5  F (36.4  C)  Temp src: Oral BP: 116/59 Pulse: 84   Resp: 16 SpO2: 95 % O2 Device: None (Room air)    Vitals:    10/21/20 0613   Weight: 85.3 kg (188 lb)     Vital Signs with Ranges  Temp:  [96.7  F (35.9  C)-98.6  F (37  C)] 97.5  F (36.4  C)  Pulse:  [66-92] 84  Resp:  [16-20] 16  BP: (108-121)/(58-64) 116/59  SpO2:  [95 %-97 %] 95 %  I/O last 3 completed shifts:  In: 2170 [P.O.:1560; I.V.:610]  Out: 2075 [Urine:2075]    Constitutional: Alert, cooperative, no apparent distress  Respiratory: Non labored breathing, clear to auscultation bilaterally, no crackles or wheezing  Cardiovascular: Regular rate and rhythm, no murmurs, no edema  GI: Normal bowel sounds, soft, non-distended, Gilmore catheter in place  Skin: No obvious rash  Neuro: Alert, engages in appropriate conversation, fluent speech, moving all extremities, no facial asymmetry  Psych: Calm and pleasant, no obvious anxiety/ depression      Discharge Disposition   Discharged to home  Condition at discharge: Stable    Consultations This Hospital Stay   HOSPITALIST IP CONSULT    Time Spent on this Encounter   ISandie MD, personally saw the patient today and spent greater than 30 minutes discharging this patient.    Discharge Orders      Reason for your hospital stay    Hospitalized for cystoscopy and kidney stone removal     Follow-up and recommended labs and tests     Follow up with primary care provider, Art Cheung, within 7 days for hospital follow- up.  No follow up labs or test are needed.  Follow up with Dr Le in 4 weeks, Urology     Activity    Your activity upon discharge: activity as tolerated     When to contact your care team    Call your primary doctor if you have any of the following: worsening pain, fever, bloody urine, painful urination     Full Code     Diet    Follow this diet upon discharge: Orders Placed This Encounter      Advance Diet as Tolerated: Regular Diet Adult     Discharge Medications   Discharge Medication List as of  10/22/2020 12:00 PM      CONTINUE these medications which have NOT CHANGED    Details   blood glucose (ACCU-CHEK UCHE PLUS) test strip 1 strip by In Vitro route daily, Disp-100 strip, R-11, E-Prescribe      blood glucose monitoring (ACCU-CHEK UCHE PLUS) meter device kit TEST BLOOD SUGAR ONE TIME DAILY  OR AS DIRECTEDDisp-1 kit, [id number]      calcium-vitamin D (CALCIUM 600 + D) 600-400 MG-UNIT per tablet Take 1 tablet by mouth daily, Disp-100 tablet,R-PRN, E-Prescribe      Cholecalciferol (VITAMIN D) 2000 units tablet Take 2,000 Units by mouth daily, Disp-90 tablet,R-3, E-Prescribe      Cyanocobalamin (B-12 SUPER STRENGTH) 5000 MCG/ML LIQD Place 0.5 mLs under the tongue every other day FOR VITAMIN B12 SUPPLEMENTATION, PLEASE PLACE UNDER THE TONGUE, Disp-2 Bottle,R-PRN, No Print Out      glimepiride (AMARYL) 1 MG tablet Take 4 tablets (4 mg) by mouth every morning (before breakfast), Disp-360 tablet, R-0, No Print Out      Iron-Vitamin C (VITRON-C PO) Take 1 tablet by mouth daily, Historical      Lactobacillus (PROBIOTIC ACIDOPHILUS) CAPS Take by mouth daily , Historical      multivitamin w/minerals (THERA-VIT-M) tablet Take 1 tablet by mouth daily, Historical      pantoprazole (PROTONIX) 40 MG EC tablet Take 1 tablet (40 mg) by mouth every morning (before breakfast), Disp-30 tablet, R-1, E-PrescribeFuture refills by PCP Dr. Art Cheung with phone number [phone].      potassium citrate (UROCIT-K) 10 MEQ (1080 MG) CR tablet TAKE 1 TABLET BY MOUTH TWICE A DAY, Disp-180 tablet,R-3, E-Prescribe      simvastatin (ZOCOR) 20 MG tablet Take 1 tablet (20 mg) by mouth At Bedtime, Disp-90 tablet,R-3, E-PrescribePROFILE FOR FUTURE REFILLS      tamsulosin (FLOMAX) 0.4 MG capsule Take 1 capsule (0.4 mg) by mouth daily, Disp-90 capsule,R-0, No Print Out         STOP taking these medications       lisinopril (ZESTRIL) 20 MG tablet Comments:   Reason for Stopping:             Allergies   Allergies   Allergen  Reactions     Simvastatin Nausea     Data   Most Recent 3 CBC's:  Recent Labs   Lab Test 10/22/20  0743 10/21/20  1117 10/21/20  0631 10/20/20  1452   WBC 4.6 4.5  --  4.3   HGB 7.9* 8.5* 7.9* 8.1*   MCV 93 93  --  95    219  --  286      Most Recent 3 BMP's:  Recent Labs   Lab Test 10/22/20  0743 10/21/20  1117 10/21/20  0631 10/20/20  1452    139  --  136   POTASSIUM 4.4 5.2 4.6 4.7   CHLORIDE 112* 110*  --  106   CO2 25 23  --  25   BUN 22 28  --  27   CR 1.86* 2.01*  --  2.16*   ANIONGAP 4 6  --  5   CIARRA 8.0* 8.3*  --  8.7   * 181* 167* 295*     Most Recent 2 LFT's:  Recent Labs   Lab Test 09/29/20  1622 05/21/18  0753 08/12/17  0918   AST 12  --  33   ALT 24 38 55   ALKPHOS 77  --  84   BILITOTAL 0.3  --  0.6     Most Recent INR's and Anticoagulation Dosing History:  Anticoagulation Dose History     Recent Dosing and Labs Latest Ref Rng & Units 4/6/2010 11/30/2010 2/19/2011 11/1/2012 9/29/2020 9/29/2020    INR 0.86 - 1.14 0.94 0.99 1.14 1.12 1.20(H) 1.20(H)        Most Recent 3 Troponin's:  Recent Labs   Lab Test 11/01/12  0921   TROPI 0.014     Most Recent Cholesterol Panel:  Recent Labs   Lab Test 08/17/20  0750   CHOL 145   LDL 71   HDL 32*   TRIG 208*     Most Recent 6 Bacteria Isolates From Any Culture (See EPIC Reports for Culture Details):  Recent Labs   Lab Test 09/27/16  0840   CULT No growth     Most Recent TSH, T4 and A1c Labs:  Recent Labs   Lab Test 08/17/20  0750 01/31/17  1829 01/31/17  1829   TSH  --   --  2.84   T4  --   --  0.82   A1C 9.1*   < > 7.3*    < > = values in this interval not displayed.       Results for orders placed or performed during the hospital encounter of 10/21/20   IR Renal Stone Removal Left    Narrative    IR RENAL STONE REMOVAL LEFT  10/21/2020 10:34 AM     HISTORY: Greater than 2 centimeter left renal stone burden    FINDINGS: The procedure was performed in the operating room under  general anesthetic. The co-surgeon was Dr. Le. Dr. Le placed  a  left ureteral sheath. He placed a ureteroscope in a posterior lower  pole calyx filled with multiple stones. The injected contrast to  better define the calyx on fluoroscopy.    Under fluoroscopic guidance, the lower pole posterior calyx was  successfully punctured using an 18gauge needle. A wire was placed and  advanced down the ureter through the previously placed ureteral sheath  for through and through access. The needle was then removed.    An X-Force balloon was then used to dilate the parenchymal tract. A  working sheath was advanced over the balloon into the collecting  system and the balloon was removed. Dr. Le then removed multiple  stones utilizing a combination of ultrasonic lithotripsy and basket  snares. The place a double-J stent at the end of the procedure and  remove the percutaneous nephrolithotomy access sheath.    Fluoroscopy time: 7.5 minutes    Radiation dose: 86.6 mGy Air Kerma      Impression    IMPRESSION: Successful percutaneous nephrolithotomy.    JODEE ANDERSON MD

## 2020-10-22 NOTE — PLAN OF CARE
[id number] shift. POD 0. Vitals stable. A&O. Ambulated halls SBA. Denies pain, refused scheduled Tylenol. Tolerating full liquids, advance to regular for breakfast tomorrow. Bowels hypoactive, no gas yet. Chen patent draining blood tinged urine. Incision to L mid back CDI. Hgb re-check in AM. Likely pull chen tomorrow AM for TOV and discharge to home.

## 2020-10-22 NOTE — PROGRESS NOTES
"/59 (BP Location: Left arm)   Pulse 84   Temp 97.5  F (36.4  C) (Oral)   Resp 16   Ht 1.727 m (5' 8\")   Wt 85.3 kg (188 lb)   SpO2 95%   BMI 28.59 kg/m      Patient making good progress POD 1 following percutaneous nephrolithotomy.  Gilmore catheter is being removed.  He can go home today.  He will follow-up with Dr. Le in the office at which time his stent will be removed and have further discussions about stone analysis and stone prevention.  "

## 2020-10-23 ENCOUNTER — VIRTUAL VISIT (OUTPATIENT)
Dept: UROLOGY | Facility: CLINIC | Age: 70
End: 2020-10-23
Payer: COMMERCIAL

## 2020-10-23 DIAGNOSIS — N20.0 RIGHT KIDNEY STONE: Primary | ICD-10-CM

## 2020-10-23 DIAGNOSIS — N20.0 KIDNEY STONE ON LEFT SIDE: ICD-10-CM

## 2020-10-23 DIAGNOSIS — D62 ANEMIA DUE TO ACUTE BLOOD LOSS: ICD-10-CM

## 2020-10-23 DIAGNOSIS — N20.1 LEFT URETERAL STONE: ICD-10-CM

## 2020-10-23 LAB — GLUCOSE BLDC GLUCOMTR-MCNC: 140 MG/DL (ref 70–99)

## 2020-10-23 PROCEDURE — 99213 OFFICE O/P EST LOW 20 MIN: CPT | Mod: 95 | Performed by: UROLOGY

## 2020-10-23 NOTE — LETTER
10/23/2020       RE: Los Renee  [address] [facility] [address] [address]     Dear Colleague,    Thank you for referring your patient, Los Renee, to the [facility] [place] at [facility]. Please see a copy of my visit note below.    [place]   CHIEF COMPLAINT   It was my pleasure to see Los Renee who is a 70 year old male for follow-up of bilateral nephrolithiasis.      HPI   Los Renee is a very pleasant 70 year old male who presents with a history of bilateral nephrolithiasis, recent GI bleed causing acute blood loss anemia, and obstructing stone causing KASEY.  He had prior history of ESWL and ureteroscopy with last intervention in 2016.  He was noted to have interval increase of his serum creatinine over the summer from a baseline of 1.1-1 0.4-2.0.  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  We discussed treatment options and he elects to proceed with the above surgery.  In the intervening time he developed acute blood loss anemia from an upper GI bleed which required hospitalization, GI intervention and clipping, and transfusion of blood products.  He was discharged from the hospital on 10/1/2020.  His hemoglobin was stable with last check 10/20/20 at 8.1.  Multidisciplinary discussion was had with patient's primary care provider and given his renal dysfunction decision made to proceed.    10/21/20:  He underwent a left PCNL.  He was transfused 1 unit of PRBCs at the end of the case for his low starting hemoglobin.  Blood loss during the case was estimated at 100 cc.  He was discharged on postoperative day 1    TODAY:  Telephone follow-up visit today  He is doing very well and is very happy with the outcome of surgery  He is voiding well and his hematuria is clearing  He denies any lightheadedness or  dizziness  He is tolerating the stent well with no bothersome symptoms and has not required any pain medication  He is very eager to discuss treatment for his right kidney stones    PHYSICAL EXAM  Patient is a 70 year old  male   Vitals: There were no vitals taken for this visit.  There is no height or weight on file to calculate BMI.  General: No evidence of distress   Lungs: normal respiratory effort  Neuro: Alert, oriented, speech and mentation normal  Psych: affect and mood normal     Creatinine   Date Value Ref Range Status   10/22/2020 1.86 (H) 0.66 - 1.25 mg/dL Final   10/21/2020 2.01 (H) 0.66 - 1.25 mg/dL Final   10/20/2020 2.16 (H) 0.66 - 1.25 mg/dL Final   10/12/2020 2.29 (H) 0.66 - 1.25 mg/dL Final   10/01/2020 1.37 (H) 0.66 - 1.25 mg/dL Final     Hemoglobin   Date Value Ref Range Status   10/22/2020 7.9 (L) 13.3 - 17.7 g/dL Final   10/21/2020 8.5 (L) 13.3 - 17.7 g/dL Final   10/21/2020 7.9 (L) 13.3 - 17.7 g/dL Final   10/20/2020 8.1 (L) 13.3 - 17.7 g/dL Final   10/12/2020 7.4 (LL) 13.3 - 17.7 g/dL Final     Comment:     Results confirmed by repeat test  Critical Value called to and read back by  [facility] AT [facility] ON 10 12 2020 AT [id number]       10/01/2020 8.6 (L) 13.3 - 17.7 g/dL Final   09/30/2020 6.6 (LL) 13.3 - 17.7 g/dL Final     Comment:     This result has been called to AMANDA SWENSON RN IN 68 by Chacho Izquierdo on 09 30 2020 at 2246, and has been read back.      09/30/2020 7.6 (L) 13.3 - 17.7 g/dL Final   09/30/2020 7.6 (L) 13.3 - 17.7 g/dL Final   09/30/2020 8.1 (L) 13.3 - 17.7 g/dL Final       IMAGING:  All pertinent imaging reviewed:    All imaging studies reviewed by me.  I personally reviewed these imaging films.  A formal report from radiology will follow.    FINDINGS:   LOWER CHEST: Normal.     HEPATOBILIARY: There are small calcified gallstones. The liver is  unremarkable.     PANCREAS: Normal.     SPLEEN: Normal.     ADRENAL GLANDS: Nodular thickening left adrenal gland unchanged  and  compatible with adenoma. Right adrenal gland is normal.     KIDNEYS/BLADDER: There are multiple large bilateral urinary tract  calculi. No ureteral calcifications. A large calculus at the left  ureteropelvic junction measures up to 1.6 cm. Mild left  hydronephrosis. Mild left perinephric infiltration.     BOWEL: Evidence of prior bariatric surgery. No bowel obstruction or  free air. The appendix has been removed.     LYMPH NODES: Unremarkable.     VASCULATURE: Mild nonaneurysmal aortic atherosclerosis.     PELVIC ORGANS: Normal.     OTHER: Small fat-containing bilateral inguinal hernias.     MUSCULOSKELETAL: Unremarkable.                                                                      IMPRESSION:   1.  Mild left hydronephrosis with a large 1.6 cm calculus at the left  ureteropelvic junction.  2.  Numerous large bilateral urinary tract calculi. Stone burden  increased compared to 2016.        ASSESSMENT and PLAN  70-year-old man with bilateral nephrolithiasis now status post a left-sided PCNL on 10/21/2020.  Medical history significant for recent GI bleed with acute blood loss anemia and recent acute kidney injury likely from his obstructing left UPJ stone which has now been surgically treated.    Right kidney stones  -I reviewed his labs and imaging  -I measure his larger stone at 11 mm with an additional 6 mm stone in the lower pole.  He has 2 other smaller sub-5 mm stones.  - We discussed treatment options which include:  ---- ESWL: We discussed that based on the stone size and location lower pole he would not be recommended to undergo ESWL  ---- URETEROSCOPY: we discussed that there would be 5-10% chance of requiring a staged procedure. We discussed the need for a ureteral stent.  We discussed that his left ureteral stent could be removed during this same procedure  -He would like to proceed with scheduling and we will plan to complete this in a few weeks to allow adequate time for healing following his  "left PCNL.  -We will plan for an updated CT scan next week to assess for any residual stone fragments on the left side as well as repeat of a BMP and CBC  -Orders for surgery placed    Telephone time: 15 minutes    Asher Le MD   Urology  [facility]  [facility] Phone: [phone]  [facility] Phone: [phone]    Los Renee is a 70 year old male who is being evaluated via a billable telephone visit.      The patient has been notified of following:     \"This telephone visit will be conducted via a call between you and your physician/provider. We have found that certain health care needs can be provided without the need for a physical exam.  This service lets us provide the care you need with a short phone conversation.  If a prescription is necessary we can send it directly to your pharmacy.  If lab work is needed we can place an order for that and you can then stop by our lab to have the test done at a later time.    Telephone visits are billed at different rates depending on your insurance coverage. During this emergency period, for some insurers they may be billed the same as an in-person visit.  Please reach out to your insurance provider with any questions.    If during the course of the call the physician/provider feels a telephone visit is not appropriate, you will not be charged for this service.\"    Patient has given verbal consent for Telephone visit?  Yes    How would you like to obtain your AVS? MyChart        "

## 2020-10-23 NOTE — PROGRESS NOTES
[facility]   CHIEF COMPLAINT   It was my pleasure to see Los Renee who is a 70 year old male for follow-up of bilateral nephrolithiasis.      HPI   Los Renee is a very pleasant 70 year old male who presents with a history of bilateral nephrolithiasis, recent GI bleed causing acute blood loss anemia, and obstructing stone causing KASEY.  He had prior history of ESWL and ureteroscopy with last intervention in 2016.  He was noted to have interval increase of his serum creatinine over the summer from a baseline of 1.1-1 0.4-2.0.  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  We discussed treatment options and he elects to proceed with the above surgery.  In the intervening time he developed acute blood loss anemia from an upper GI bleed which required hospitalization, GI intervention and clipping, and transfusion of blood products.  He was discharged from the hospital on 10/1/2020.  His hemoglobin was stable with last check 10/20/20 at 8.1.  Multidisciplinary discussion was had with patient's primary care provider and given his renal dysfunction decision made to proceed.    10/21/20:  He underwent a left PCNL.  He was transfused 1 unit of PRBCs at the end of the case for his low starting hemoglobin.  Blood loss during the case was estimated at 100 cc.  He was discharged on postoperative day 1    TODAY:  Telephone follow-up visit today  He is doing very well and is very happy with the outcome of surgery  He is voiding well and his hematuria is clearing  He denies any lightheadedness or dizziness  He is tolerating the stent well with no bothersome symptoms and has not required any pain medication  He is very eager to discuss treatment for his right kidney stones    PHYSICAL EXAM  Patient is a 70 year old  male   Vitals: There were no vitals taken for this visit.  There is no height or weight on  file to calculate BMI.  General: No evidence of distress   Lungs: normal respiratory effort  Neuro: Alert, oriented, speech and mentation normal  Psych: affect and mood normal     Creatinine   Date Value Ref Range Status   10/22/2020 1.86 (H) 0.66 - 1.25 mg/dL Final   10/21/2020 2.01 (H) 0.66 - 1.25 mg/dL Final   10/20/2020 2.16 (H) 0.66 - 1.25 mg/dL Final   10/12/2020 2.29 (H) 0.66 - 1.25 mg/dL Final   10/01/2020 1.37 (H) 0.66 - 1.25 mg/dL Final     Hemoglobin   Date Value Ref Range Status   10/22/2020 7.9 (L) 13.3 - 17.7 g/dL Final   10/21/2020 8.5 (L) 13.3 - 17.7 g/dL Final   10/21/2020 7.9 (L) 13.3 - 17.7 g/dL Final   10/20/2020 8.1 (L) 13.3 - 17.7 g/dL Final   10/12/2020 7.4 (LL) 13.3 - 17.7 g/dL Final     Comment:     Results confirmed by repeat test  Critical Value called to and read back by  BRAXTON AT [facility] ON 10 12 2020 AT 0832Y AG      10/01/2020 8.6 (L) 13.3 - 17.7 g/dL Final   09/30/2020 6.6 (LL) 13.3 - 17.7 g/dL Final     Comment:     This result has been called to AMANDA SWENSON RN IN 66 by Chacho Izquierdo on 09 30 2020 at 2246, and has been read back.      09/30/2020 7.6 (L) 13.3 - 17.7 g/dL Final   09/30/2020 7.6 (L) 13.3 - 17.7 g/dL Final   09/30/2020 8.1 (L) 13.3 - 17.7 g/dL Final       IMAGING:  All pertinent imaging reviewed:    All imaging studies reviewed by me.  I personally reviewed these imaging films.  A formal report from radiology will follow.    FINDINGS:   LOWER CHEST: Normal.     HEPATOBILIARY: There are small calcified gallstones. The liver is  unremarkable.     PANCREAS: Normal.     SPLEEN: Normal.     ADRENAL GLANDS: Nodular thickening left adrenal gland unchanged and  compatible with adenoma. Right adrenal gland is normal.     KIDNEYS/BLADDER: There are multiple large bilateral urinary tract  calculi. No ureteral calcifications. A large calculus at the left  ureteropelvic junction measures up to 1.6 cm. Mild left  hydronephrosis. Mild left perinephric infiltration.     BOWEL:  Evidence of prior bariatric surgery. No bowel obstruction or  free air. The appendix has been removed.     LYMPH NODES: Unremarkable.     VASCULATURE: Mild nonaneurysmal aortic atherosclerosis.     PELVIC ORGANS: Normal.     OTHER: Small fat-containing bilateral inguinal hernias.     MUSCULOSKELETAL: Unremarkable.                                                                      IMPRESSION:   1.  Mild left hydronephrosis with a large 1.6 cm calculus at the left  ureteropelvic junction.  2.  Numerous large bilateral urinary tract calculi. Stone burden  increased compared to 2016.        ASSESSMENT and PLAN  70-year-old man with bilateral nephrolithiasis now status post a left-sided PCNL on 10/21/2020.  Medical history significant for recent GI bleed with acute blood loss anemia and recent acute kidney injury likely from his obstructing left UPJ stone which has now been surgically treated.    Right kidney stones  -I reviewed his labs and imaging  -I measure his larger stone at 11 mm with an additional 6 mm stone in the lower pole.  He has 2 other smaller sub-5 mm stones.  - We discussed treatment options which include:  ---- ESWL: We discussed that based on the stone size and location lower pole he would not be recommended to undergo ESWL  ---- URETEROSCOPY: we discussed that there would be 5-10% chance of requiring a staged procedure. We discussed the need for a ureteral stent.  We discussed that his left ureteral stent could be removed during this same procedure  -He would like to proceed with scheduling and we will plan to complete this in a few weeks to allow adequate time for healing following his left PCNL.  -We will plan for an updated CT scan next week to assess for any residual stone fragments on the left side as well as repeat of a BMP and CBC  -Orders for surgery placed    Telephone time: 15 minutes    Asher Le MD   Urology  [facility]  "Phone: [phone]  [facility] Phone: [phone]    Los Renee is a 70 year old male who is being evaluated via a billable telephone visit.      The patient has been notified of following:     \"This telephone visit will be conducted via a call between you and your physician/provider. We have found that certain health care needs can be provided without the need for a physical exam.  This service lets us provide the care you need with a short phone conversation.  If a prescription is necessary we can send it directly to your pharmacy.  If lab work is needed we can place an order for that and you can then stop by our lab to have the test done at a later time.    Telephone visits are billed at different rates depending on your insurance coverage. During this emergency period, for some insurers they may be billed the same as an in-person visit.  Please reach out to your insurance provider with any questions.    If during the course of the call the physician/provider feels a telephone visit is not appropriate, you will not be charged for this service.\"    Patient has given verbal consent for Telephone visit?  Yes    How would you like to obtain your AVS? MyChart        "

## 2020-10-26 DIAGNOSIS — Z11.59 ENCOUNTER FOR SCREENING FOR OTHER VIRAL DISEASES: Primary | ICD-10-CM

## 2020-10-26 PROBLEM — N20.0 KIDNEY STONE ON LEFT SIDE: Status: ACTIVE | Noted: 2020-10-26

## 2020-10-26 PROBLEM — N20.0 RIGHT KIDNEY STONE: Status: ACTIVE | Noted: 2020-10-26

## 2020-10-26 LAB
APPEARANCE STONE: NORMAL
COMPN STONE: NORMAL
INTERPRETATION ECG - MUSE: NORMAL
NUMBER STONE: NORMAL
SIZE STONE: NORMAL MM
WT STONE: NORMAL MG

## 2020-10-27 ENCOUNTER — HOSPITAL ENCOUNTER (OUTPATIENT)
Dept: LAB | Facility: CLINIC | Age: 70
End: 2020-10-27
Attending: UROLOGY
Payer: COMMERCIAL

## 2020-10-27 ENCOUNTER — HOSPITAL ENCOUNTER (OUTPATIENT)
Dept: CT IMAGING | Facility: CLINIC | Age: 70
End: 2020-10-27
Attending: UROLOGY
Payer: COMMERCIAL

## 2020-10-27 DIAGNOSIS — N20.0 RIGHT KIDNEY STONE: ICD-10-CM

## 2020-10-27 DIAGNOSIS — N20.0 KIDNEY STONE ON LEFT SIDE: ICD-10-CM

## 2020-10-27 DIAGNOSIS — K92.2 ACUTE UPPER GI BLEED: ICD-10-CM

## 2020-10-27 DIAGNOSIS — D62 ANEMIA DUE TO ACUTE BLOOD LOSS: ICD-10-CM

## 2020-10-27 DIAGNOSIS — N20.1 LEFT URETERAL STONE: ICD-10-CM

## 2020-10-27 LAB
ANION GAP SERPL CALCULATED.3IONS-SCNC: 4 MMOL/L (ref 3–14)
BUN SERPL-MCNC: 26 MG/DL (ref 7–30)
CALCIUM SERPL-MCNC: 8.8 MG/DL (ref 8.5–10.1)
CHLORIDE SERPL-SCNC: 109 MMOL/L (ref 94–109)
CO2 SERPL-SCNC: 25 MMOL/L (ref 20–32)
CREAT SERPL-MCNC: 1.66 MG/DL (ref 0.66–1.25)
ERYTHROCYTE [DISTWIDTH] IN BLOOD BY AUTOMATED COUNT: 14.8 % (ref 10–15)
GFR SERPL CREATININE-BSD FRML MDRD: 42 ML/MIN/{1.73_M2}
GLUCOSE SERPL-MCNC: 199 MG/DL (ref 70–99)
HCT VFR BLD AUTO: 30.9 % (ref 40–53)
HGB BLD-MCNC: 9.8 G/DL (ref 13.3–17.7)
MCH RBC QN AUTO: 29.4 PG (ref 26.5–33)
MCHC RBC AUTO-ENTMCNC: 31.7 G/DL (ref 31.5–36.5)
MCV RBC AUTO: 93 FL (ref 78–100)
PLATELET # BLD AUTO: 249 10E9/L (ref 150–450)
POTASSIUM SERPL-SCNC: 4.6 MMOL/L (ref 3.4–5.3)
RBC # BLD AUTO: 3.33 10E12/L (ref 4.4–5.9)
SODIUM SERPL-SCNC: 138 MMOL/L (ref 133–144)
WBC # BLD AUTO: 4 10E9/L (ref 4–11)

## 2020-10-27 PROCEDURE — 85027 COMPLETE CBC AUTOMATED: CPT | Performed by: UROLOGY

## 2020-10-27 PROCEDURE — 74176 CT ABD & PELVIS W/O CONTRAST: CPT

## 2020-10-27 PROCEDURE — 80048 BASIC METABOLIC PNL TOTAL CA: CPT | Performed by: UROLOGY

## 2020-10-27 PROCEDURE — 36415 COLL VENOUS BLD VENIPUNCTURE: CPT | Performed by: UROLOGY

## 2020-11-06 ENCOUNTER — OFFICE VISIT (OUTPATIENT)
Dept: UROLOGY | Facility: CLINIC | Age: 70
End: 2020-11-06
Payer: COMMERCIAL

## 2020-11-06 VITALS
DIASTOLIC BLOOD PRESSURE: 58 MMHG | SYSTOLIC BLOOD PRESSURE: 100 MMHG | OXYGEN SATURATION: 98 % | HEIGHT: 68 IN | WEIGHT: 186 LBS | HEART RATE: 103 BPM | BODY MASS INDEX: 28.19 KG/M2

## 2020-11-06 DIAGNOSIS — N20.0 RIGHT KIDNEY STONE: Primary | ICD-10-CM

## 2020-11-06 DIAGNOSIS — N20.0 KIDNEY STONE ON LEFT SIDE: ICD-10-CM

## 2020-11-06 PROCEDURE — 99024 POSTOP FOLLOW-UP VISIT: CPT | Performed by: UROLOGY

## 2020-11-06 ASSESSMENT — PAIN SCALES - GENERAL: PAINLEVEL: NO PAIN (0)

## 2020-11-06 ASSESSMENT — MIFFLIN-ST. JEOR: SCORE: 1588.19

## 2020-11-06 NOTE — LETTER
11/6/2020       RE: Los Renee  [address] [address]     Dear Colleague,    Thank you for referring your patient, Los Renee, to the [facility] [place] at [facility]. Please see a copy of my visit note below.    [place]   CHIEF COMPLAINT   It was my pleasure to see Los Renee who is a 70 year old male for follow-up of bilateral nephrolithiasis.      HPI   Los Renee is a very pleasant 70 year old male who presents with a history of bilateral nephrolithiasis, recent GI bleed causing acute blood loss anemia, and obstructing stone causing KASEY.  He had prior history of ESWL and ureteroscopy with last intervention in 2016.  He was noted to have interval increase of his serum creatinine over the summer from a baseline of 1.1-1 0.4-2.0.  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  We discussed treatment options and he elects to proceed with the above surgery.  In the intervening time he developed acute blood loss anemia from an upper GI bleed which required hospitalization, GI intervention and clipping, and transfusion of blood products.  He was discharged from the hospital on 10/1/2020.  His hemoglobin was stable with last check 10/20/20 at 8.1.  Multidisciplinary discussion was had with patient's primary care provider and given his renal dysfunction decision made to proceed.    10/21/20:  He underwent a left PCNL.  He was transfused 1 unit of PRBCs at the end of the case for his low starting hemoglobin.  Blood loss during the case was estimated at 100 cc.  He was discharged on postoperative day 1    10/23/20:  Telephone follow-up visit today  He is doing very well and is very happy with the outcome of surgery  He is voiding well and his hematuria is clearing  He denies any lightheadedness or  "dizziness  He is tolerating the stent well with no bothersome symptoms and has not required any pain medication  He is very eager to discuss treatment for his right kidney stones    TODAY:  He returns for left flank stitch removal  His labs last week are very reassuring  He is scheduled for surgery on 11/16/20    PHYSICAL EXAM  Patient is a 70 year old  male   Vitals: Blood pressure 100/58, pulse 103, height 1.727 m (5' 8\"), weight 84.4 kg (186 lb), SpO2 98 %.  Body mass index is 28.28 kg/m .  General Appearance Adult:   Alert, no acute distress, oriented  HENT: throat/mouth:normal, good dentition  Lungs: no respiratory distress, or pursed lip breathing  Heart: No obvious jugular venous distension present  Abdomen: soft, nontender, no organomegaly or masses  Back: LEFT flank stitch removed  Musculoskeltal: extremities normal, no peripheral edema  Skin: no suspicious lesions or rashes  Neuro: Alert, oriented, speech and mentation normal  Psych: affect and mood normal  Gait: Normal      Creatinine   Date Value Ref Range Status   10/27/2020 1.66 (H) 0.66 - 1.25 mg/dL Final   10/22/2020 1.86 (H) 0.66 - 1.25 mg/dL Final   10/21/2020 2.01 (H) 0.66 - 1.25 mg/dL Final   10/20/2020 2.16 (H) 0.66 - 1.25 mg/dL Final   10/12/2020 2.29 (H) 0.66 - 1.25 mg/dL Final     Hemoglobin   Date Value Ref Range Status   10/27/2020 9.8 (L) 13.3 - 17.7 g/dL Final   10/22/2020 7.9 (L) 13.3 - 17.7 g/dL Final   10/21/2020 8.5 (L) 13.3 - 17.7 g/dL Final   10/21/2020 7.9 (L) 13.3 - 17.7 g/dL Final   10/20/2020 8.1 (L) 13.3 - 17.7 g/dL Final   10/12/2020 7.4 (LL) 13.3 - 17.7 g/dL Final     Comment:     Results confirmed by repeat test  Critical Value called to and read back by  BRAXTON GARCIA [facility] ON 10 12 2020 AT 0832Y AG      10/01/2020 8.6 (L) 13.3 - 17.7 g/dL Final   09/30/2020 6.6 (LL) 13.3 - 17.7 g/dL Final     Comment:     This result has been called to AMANDA SWENSON RN IN 68 by Chacho Izquierdo on 09 30 2020 at 9056, and has been read " back.      09/30/2020 7.6 (L) 13.3 - 17.7 g/dL Final   09/30/2020 7.6 (L) 13.3 - 17.7 g/dL Final       IMAGING:  All pertinent imaging reviewed:    All imaging studies reviewed by me.  I personally reviewed these imaging films.  A formal report from radiology will follow.    FINDINGS:  When compared to the previous CT, there are new  postsurgical changes consistent with a left percutaneous  nephrolithotomy. There is a small hematoma adjacent to the posterior  lower pole of the left kidney along the nephrolithotomy track.  Previously seen stones in the left kidney at its lower pole calyces  and in the left renal pelvis have been removed. There is a tiny  residual stone in a lower pole calyx that measures 2 to 3 mm in  maximum dimension. There is a left-sided double-J stent. There is  minimal prominence of the left intrarenal collecting system.     Again identified are multiple stones in lower pole calyces of the  right kidney. The largest of these measures approximately 12 x 9 mm.  There is no right hydronephrosis or hydroureter.     There are no stones in the bladder. The prostate is prominent in size  with some calcifications.     Evaluation of the remaining solid organs in the abdomen is limited due  to a lack of intravenous contrast. There are a few splenic granulomas.  Nodular thickening of the left adrenal gland is unchanged. Remaining  solid organs are grossly unremarkable. There are tiny gallstones.     There are postsurgical changes of gastric bypass. The bowel otherwise  appears grossly unremarkable.                                                                      IMPRESSION:  1. New post nephrolithotomy changes involving the left kidney and new  left-sided double-J stent. Previously seen left-sided renal stones  have been removed. There is a tiny residual stone in the lower pole  calyx as above.  2. No significant change in renal stones located at the lower pole of  the right kidney.  3.  Gallstones.  4. Stable left adrenal nodule. This most likely represents a benign  adenoma.        ASSESSMENT and PLAN  70-year-old man with bilateral nephrolithiasis now status post a left-sided PCNL on 10/21/2020.  Medical history significant for recent GI bleed with acute blood loss anemia and recent acute kidney injury likely from his obstructing left UPJ stone which has now been surgically treated. F/U for stitch removal today.    Right kidney stones  - prior discuss and he is scheduled for Ureteroscopy on 11/16/20    LEFT stones  - very small 2mm fragment which does not require intervention   - Stitch removed today    Total visit time - 10 mintues    Asher Le MD   Urology  [facility]  [facility] Phone: [phone]  [facility] Phone: [phone]

## 2020-11-06 NOTE — PROGRESS NOTES
[facility]   CHIEF COMPLAINT   It was my pleasure to see Los Renee who is a 70 year old male for follow-up of bilateral nephrolithiasis.      HPI   Los Renee is a very pleasant 70 year old male who presents with a history of bilateral nephrolithiasis, recent GI bleed causing acute blood loss anemia, and obstructing stone causing KASEY.  He had prior history of ESWL and ureteroscopy with last intervention in 2016.  He was noted to have interval increase of his serum creatinine over the summer from a baseline of 1.1-1 0.4-2.0.  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  We discussed treatment options and he elects to proceed with the above surgery.  In the intervening time he developed acute blood loss anemia from an upper GI bleed which required hospitalization, GI intervention and clipping, and transfusion of blood products.  He was discharged from the hospital on 10/1/2020.  His hemoglobin was stable with last check 10/20/20 at 8.1.  Multidisciplinary discussion was had with patient's primary care provider and given his renal dysfunction decision made to proceed.    10/21/20:  He underwent a left PCNL.  He was transfused 1 unit of PRBCs at the end of the case for his low starting hemoglobin.  Blood loss during the case was estimated at 100 cc.  He was discharged on postoperative day 1    10/23/20:  Telephone follow-up visit today  He is doing very well and is very happy with the outcome of surgery  He is voiding well and his hematuria is clearing  He denies any lightheadedness or dizziness  He is tolerating the stent well with no bothersome symptoms and has not required any pain medication  He is very eager to discuss treatment for his right kidney stones    TODAY:  He returns for left flank stitch removal  His labs last week are very reassuring  He is scheduled for surgery on  "11/16/20    PHYSICAL EXAM  Patient is a 70 year old  male   Vitals: Blood pressure 100/58, pulse 103, height 1.727 m (5' 8\"), weight 84.4 kg (186 lb), SpO2 98 %.  Body mass index is 28.28 kg/m .  General Appearance Adult:   Alert, no acute distress, oriented  HENT: throat/mouth:normal, good dentition  Lungs: no respiratory distress, or pursed lip breathing  Heart: No obvious jugular venous distension present  Abdomen: soft, nontender, no organomegaly or masses  Back: LEFT flank stitch removed  Musculoskeltal: extremities normal, no peripheral edema  Skin: no suspicious lesions or rashes  Neuro: Alert, oriented, speech and mentation normal  Psych: affect and mood normal  Gait: Normal      Creatinine   Date Value Ref Range Status   10/27/2020 1.66 (H) 0.66 - 1.25 mg/dL Final   10/22/2020 1.86 (H) 0.66 - 1.25 mg/dL Final   10/21/2020 2.01 (H) 0.66 - 1.25 mg/dL Final   10/20/2020 2.16 (H) 0.66 - 1.25 mg/dL Final   10/12/2020 2.29 (H) 0.66 - 1.25 mg/dL Final     Hemoglobin   Date Value Ref Range Status   10/27/2020 9.8 (L) 13.3 - 17.7 g/dL Final   10/22/2020 7.9 (L) 13.3 - 17.7 g/dL Final   10/21/2020 8.5 (L) 13.3 - 17.7 g/dL Final   10/21/2020 7.9 (L) 13.3 - 17.7 g/dL Final   10/20/2020 8.1 (L) 13.3 - 17.7 g/dL Final   10/12/2020 7.4 (LL) 13.3 - 17.7 g/dL Final     Comment:     Results confirmed by repeat test  Critical Value called to and read back by  BRAXTON GARCIA [facility] ON 10 12 2020 AT 0832Y AG      10/01/2020 8.6 (L) 13.3 - 17.7 g/dL Final   09/30/2020 6.6 (LL) 13.3 - 17.7 g/dL Final     Comment:     This result has been called to AMANDA SWENSON RN IN 66 by Chacho Izquierdo on 09 30 2020 at 2246, and has been read back.      09/30/2020 7.6 (L) 13.3 - 17.7 g/dL Final   09/30/2020 7.6 (L) 13.3 - 17.7 g/dL Final       IMAGING:  All pertinent imaging reviewed:    All imaging studies reviewed by me.  I personally reviewed these imaging films.  A formal report from radiology will follow.    FINDINGS:  When compared to " the previous CT, there are new  postsurgical changes consistent with a left percutaneous  nephrolithotomy. There is a small hematoma adjacent to the posterior  lower pole of the left kidney along the nephrolithotomy track.  Previously seen stones in the left kidney at its lower pole calyces  and in the left renal pelvis have been removed. There is a tiny  residual stone in a lower pole calyx that measures 2 to 3 mm in  maximum dimension. There is a left-sided double-J stent. There is  minimal prominence of the left intrarenal collecting system.     Again identified are multiple stones in lower pole calyces of the  right kidney. The largest of these measures approximately 12 x 9 mm.  There is no right hydronephrosis or hydroureter.     There are no stones in the bladder. The prostate is prominent in size  with some calcifications.     Evaluation of the remaining solid organs in the abdomen is limited due  to a lack of intravenous contrast. There are a few splenic granulomas.  Nodular thickening of the left adrenal gland is unchanged. Remaining  solid organs are grossly unremarkable. There are tiny gallstones.     There are postsurgical changes of gastric bypass. The bowel otherwise  appears grossly unremarkable.                                                                      IMPRESSION:  1. New post nephrolithotomy changes involving the left kidney and new  left-sided double-J stent. Previously seen left-sided renal stones  have been removed. There is a tiny residual stone in the lower pole  calyx as above.  2. No significant change in renal stones located at the lower pole of  the right kidney.  3. Gallstones.  4. Stable left adrenal nodule. This most likely represents a benign  adenoma.        ASSESSMENT and PLAN  70-year-old man with bilateral nephrolithiasis now status post a left-sided PCNL on 10/21/2020.  Medical history significant for recent GI bleed with acute blood loss anemia and recent acute kidney  injury likely from his obstructing left UPJ stone which has now been surgically treated. F/U for stitch removal today.    Right kidney stones  - prior discuss and he is scheduled for Ureteroscopy on 11/16/20    LEFT stones  - very small 2mm fragment which does not require intervention   - Stitch removed today    Total visit time - 10 mintues    Asher Le MD   Urology  [facility]  [facility] Phone: [phone]  [facility] Phone: [phone]

## 2020-11-10 DIAGNOSIS — E11.42 TYPE 2 DIABETES MELLITUS WITH DIABETIC POLYNEUROPATHY, WITHOUT LONG-TERM CURRENT USE OF INSULIN (H): ICD-10-CM

## 2020-11-10 NOTE — TELEPHONE ENCOUNTER
Los called to say he will be out of Glimepiride yet today.    He requests this be refilled yet today.    Los wants to make sure the prescription says he takes 4 of the 1mg tablets every morning.    Please call Los to let him know it has been refilled yet today. [phone]

## 2020-11-11 DIAGNOSIS — N17.9 ACUTE RENAL FAILURE, UNSPECIFIED ACUTE RENAL FAILURE TYPE (H): Primary | ICD-10-CM

## 2020-11-11 RX ORDER — GLIMEPIRIDE 1 MG/1
4 TABLET ORAL
Qty: 360 TABLET | Refills: 0 | Status: SHIPPED | OUTPATIENT
Start: 2020-11-11 | End: 2021-02-16

## 2020-11-11 NOTE — TELEPHONE ENCOUNTER
It appears that the last prescription that was given was for 360 tablets at 4 tablets a day which should be a 90-day supply.

## 2020-11-11 NOTE — TELEPHONE ENCOUNTER
Patient called the clinic requesting Rx asap, he is completely out. Patient to be called back once Rx is filled. Thank you.

## 2020-11-11 NOTE — TELEPHONE ENCOUNTER
Can partner please refill.  Pt is upset at having to wait so long.  Please call him to let him know when done.

## 2020-11-11 NOTE — TELEPHONE ENCOUNTER
"Script dating 10/12/20 with updated instructions was \"No print out\", so it didn't go anywhere.  New script with 4mg daily needs to be sent to pharmacy.  "

## 2020-11-12 DIAGNOSIS — Z11.59 ENCOUNTER FOR SCREENING FOR OTHER VIRAL DISEASES: ICD-10-CM

## 2020-11-12 PROCEDURE — 99000 SPECIMEN HANDLING OFFICE-LAB: CPT | Performed by: PATHOLOGY

## 2020-11-12 PROCEDURE — U0003 INFECTIOUS AGENT DETECTION BY NUCLEIC ACID (DNA OR RNA); SEVERE ACUTE RESPIRATORY SYNDROME CORONAVIRUS 2 (SARS-COV-2) (CORONAVIRUS DISEASE [COVID-19]), AMPLIFIED PROBE TECHNIQUE, MAKING USE OF HIGH THROUGHPUT TECHNOLOGIES AS DESCRIBED BY CMS-2020-01-R: HCPCS | Mod: 90 | Performed by: PATHOLOGY

## 2020-11-13 LAB
SARS-COV-2 RNA SPEC QL NAA+PROBE: NOT DETECTED
SPECIMEN SOURCE: NORMAL

## 2020-11-13 RX ORDER — AMOXICILLIN 500 MG/1
2000 CAPSULE ORAL DAILY PRN
COMMUNITY
End: 2020-12-25

## 2020-11-16 ENCOUNTER — ANESTHESIA EVENT (OUTPATIENT)
Dept: SURGERY | Facility: CLINIC | Age: 70
End: 2020-11-16
Payer: COMMERCIAL

## 2020-11-16 ENCOUNTER — ANESTHESIA (OUTPATIENT)
Dept: SURGERY | Facility: CLINIC | Age: 70
End: 2020-11-16
Payer: COMMERCIAL

## 2020-11-16 ENCOUNTER — HOSPITAL ENCOUNTER (OUTPATIENT)
Facility: CLINIC | Age: 70
Discharge: HOME | End: 2020-11-16
Attending: UROLOGY | Admitting: UROLOGY
Payer: COMMERCIAL

## 2020-11-16 ENCOUNTER — APPOINTMENT (OUTPATIENT)
Dept: GENERAL RADIOLOGY | Facility: CLINIC | Age: 70
End: 2020-11-16
Attending: UROLOGY
Payer: COMMERCIAL

## 2020-11-16 VITALS
DIASTOLIC BLOOD PRESSURE: 73 MMHG | HEIGHT: 68 IN | OXYGEN SATURATION: 98 % | BODY MASS INDEX: 28.52 KG/M2 | WEIGHT: 188.2 LBS | HEART RATE: 79 BPM | TEMPERATURE: 97.6 F | SYSTOLIC BLOOD PRESSURE: 135 MMHG | RESPIRATION RATE: 16 BRPM

## 2020-11-16 DIAGNOSIS — N20.0 RIGHT KIDNEY STONE: Primary | ICD-10-CM

## 2020-11-16 DIAGNOSIS — N20.0 KIDNEY STONE ON LEFT SIDE: ICD-10-CM

## 2020-11-16 LAB
ANION GAP SERPL CALCULATED.3IONS-SCNC: 2 MMOL/L (ref 3–14)
BUN SERPL-MCNC: 26 MG/DL (ref 7–30)
CALCIUM SERPL-MCNC: 9.1 MG/DL (ref 8.5–10.1)
CHLORIDE SERPL-SCNC: 108 MMOL/L (ref 94–109)
CO2 SERPL-SCNC: 29 MMOL/L (ref 20–32)
CREAT SERPL-MCNC: 1.58 MG/DL (ref 0.66–1.25)
ERYTHROCYTE [DISTWIDTH] IN BLOOD BY AUTOMATED COUNT: 14.7 % (ref 10–15)
GFR SERPL CREATININE-BSD FRML MDRD: 44 ML/MIN/{1.73_M2}
GLUCOSE SERPL-MCNC: 166 MG/DL (ref 70–99)
HCT VFR BLD AUTO: 37.7 % (ref 40–53)
HGB BLD-MCNC: 12 G/DL (ref 13.3–17.7)
MCH RBC QN AUTO: 28.7 PG (ref 26.5–33)
MCHC RBC AUTO-ENTMCNC: 31.8 G/DL (ref 31.5–36.5)
MCV RBC AUTO: 90 FL (ref 78–100)
PLATELET # BLD AUTO: 196 10E9/L (ref 150–450)
POTASSIUM SERPL-SCNC: 4.4 MMOL/L (ref 3.4–5.3)
RBC # BLD AUTO: 4.18 10E12/L (ref 4.4–5.9)
SODIUM SERPL-SCNC: 139 MMOL/L (ref 133–144)
WBC # BLD AUTO: 5 10E9/L (ref 4–11)

## 2020-11-16 PROCEDURE — 360N000020 HC SURGERY LEVEL 3 1ST 30 MIN: Performed by: UROLOGY

## 2020-11-16 PROCEDURE — 250N000011 HC RX IP 250 OP 636: Performed by: UROLOGY

## 2020-11-16 PROCEDURE — 999N000139 HC STATISTIC PRE-PROCEDURE ASSESSMENT II: Performed by: UROLOGY

## 2020-11-16 PROCEDURE — C1758 CATHETER, URETERAL: HCPCS | Performed by: UROLOGY

## 2020-11-16 PROCEDURE — C2617 STENT, NON-COR, TEM W/O DEL: HCPCS | Performed by: UROLOGY

## 2020-11-16 PROCEDURE — 250N000011 HC RX IP 250 OP 636: Performed by: NURSE ANESTHETIST, CERTIFIED REGISTERED

## 2020-11-16 PROCEDURE — 250N000009 HC RX 250: Performed by: UROLOGY

## 2020-11-16 PROCEDURE — C1894 INTRO/SHEATH, NON-LASER: HCPCS | Performed by: UROLOGY

## 2020-11-16 PROCEDURE — 80048 BASIC METABOLIC PNL TOTAL CA: CPT | Performed by: UROLOGY

## 2020-11-16 PROCEDURE — C1769 GUIDE WIRE: HCPCS | Performed by: UROLOGY

## 2020-11-16 PROCEDURE — 250N000003 HC SEVOFLURANE, EA 15 MIN: Performed by: UROLOGY

## 2020-11-16 PROCEDURE — 360N000021 HC SURGERY LEVEL 3 EA 15 ADDTL MIN: Performed by: UROLOGY

## 2020-11-16 PROCEDURE — 85027 COMPLETE CBC AUTOMATED: CPT | Performed by: UROLOGY

## 2020-11-16 PROCEDURE — 258N000003 HC RX IP 258 OP 636: Performed by: NURSE ANESTHETIST, CERTIFIED REGISTERED

## 2020-11-16 PROCEDURE — 36415 COLL VENOUS BLD VENIPUNCTURE: CPT | Performed by: UROLOGY

## 2020-11-16 PROCEDURE — 52356 CYSTO/URETERO W/LITHOTRIPSY: CPT | Mod: 22 | Performed by: UROLOGY

## 2020-11-16 PROCEDURE — 250N000013 HC RX MED GY IP 250 OP 250 PS 637: Performed by: UROLOGY

## 2020-11-16 PROCEDURE — 370N000001 HC ANESTHESIA TECHNICAL FEE, 1ST 30 MIN: Performed by: UROLOGY

## 2020-11-16 PROCEDURE — 761N000001 HC RECOVERY PHASE 1 LEVEL 1 FIRST HR: Performed by: UROLOGY

## 2020-11-16 PROCEDURE — 88300 SURGICAL PATH GROSS: CPT | Mod: 26 | Performed by: PATHOLOGY

## 2020-11-16 PROCEDURE — 999N000179 XR SURGERY CARM FLUORO LESS THAN 5 MIN W STILLS: Mod: TC

## 2020-11-16 PROCEDURE — 272N000001 HC OR GENERAL SUPPLY STERILE: Performed by: UROLOGY

## 2020-11-16 PROCEDURE — 82365 CALCULUS SPECTROSCOPY: CPT | Performed by: UROLOGY

## 2020-11-16 PROCEDURE — 250N000009 HC RX 250: Performed by: NURSE ANESTHETIST, CERTIFIED REGISTERED

## 2020-11-16 PROCEDURE — 88300 SURGICAL PATH GROSS: CPT | Mod: TC | Performed by: UROLOGY

## 2020-11-16 PROCEDURE — 370N000002 HC ANESTHESIA TECHNICAL FEE, EACH ADDTL 15 MIN: Performed by: UROLOGY

## 2020-11-16 PROCEDURE — 761N000007 HC RECOVERY PHASE 2 EACH 15 MINS: Performed by: UROLOGY

## 2020-11-16 PROCEDURE — 74420 UROGRAPHY RTRGR +-KUB: CPT | Mod: 26 | Performed by: UROLOGY

## 2020-11-16 DEVICE — STENT URETERAL CONTOUR SOFT PERCUFLEX 6FRX24CM: Type: IMPLANTABLE DEVICE | Site: URETER | Status: FUNCTIONAL

## 2020-11-16 RX ORDER — MAGNESIUM HYDROXIDE 1200 MG/15ML
LIQUID ORAL PRN
Status: DISCONTINUED | OUTPATIENT
Start: 2020-11-16 | End: 2020-11-16 | Stop reason: HOSPADM

## 2020-11-16 RX ORDER — FENTANYL CITRATE 50 UG/ML
25-50 INJECTION, SOLUTION INTRAMUSCULAR; INTRAVENOUS
Status: DISCONTINUED | OUTPATIENT
Start: 2020-11-16 | End: 2020-11-16 | Stop reason: HOSPADM

## 2020-11-16 RX ORDER — ONDANSETRON 4 MG/1
4 TABLET, ORALLY DISINTEGRATING ORAL EVERY 30 MIN PRN
Status: DISCONTINUED | OUTPATIENT
Start: 2020-11-16 | End: 2020-11-16 | Stop reason: HOSPADM

## 2020-11-16 RX ORDER — FENTANYL CITRATE 50 UG/ML
INJECTION, SOLUTION INTRAMUSCULAR; INTRAVENOUS PRN
Status: DISCONTINUED | OUTPATIENT
Start: 2020-11-16 | End: 2020-11-16

## 2020-11-16 RX ORDER — NALOXONE HYDROCHLORIDE 0.4 MG/ML
.1-.4 INJECTION, SOLUTION INTRAMUSCULAR; INTRAVENOUS; SUBCUTANEOUS
Status: DISCONTINUED | OUTPATIENT
Start: 2020-11-16 | End: 2020-11-16 | Stop reason: HOSPADM

## 2020-11-16 RX ORDER — OXYCODONE HYDROCHLORIDE 5 MG/1
5 TABLET ORAL EVERY 6 HOURS PRN
Qty: 9 TABLET | Refills: 0 | Status: SHIPPED | OUTPATIENT
Start: 2020-11-16 | End: 2020-11-25

## 2020-11-16 RX ORDER — HYDROMORPHONE HYDROCHLORIDE 1 MG/ML
.3-.5 INJECTION, SOLUTION INTRAMUSCULAR; INTRAVENOUS; SUBCUTANEOUS EVERY 10 MIN PRN
Status: DISCONTINUED | OUTPATIENT
Start: 2020-11-16 | End: 2020-11-16 | Stop reason: HOSPADM

## 2020-11-16 RX ORDER — OXYBUTYNIN CHLORIDE 5 MG/1
5 TABLET, EXTENDED RELEASE ORAL DAILY
Qty: 10 TABLET | Refills: 0 | Status: SHIPPED | OUTPATIENT
Start: 2020-11-16 | End: 2020-11-26

## 2020-11-16 RX ORDER — PROPOFOL 10 MG/ML
INJECTION, EMULSION INTRAVENOUS CONTINUOUS PRN
Status: DISCONTINUED | OUTPATIENT
Start: 2020-11-16 | End: 2020-11-16

## 2020-11-16 RX ORDER — CIPROFLOXACIN 500 MG/1
500 TABLET, FILM COATED ORAL ONCE
Qty: 1 TABLET | Refills: 0 | Status: SHIPPED | OUTPATIENT
Start: 2020-11-16 | End: 2020-11-16

## 2020-11-16 RX ORDER — LIDOCAINE HYDROCHLORIDE 20 MG/ML
INJECTION, SOLUTION INFILTRATION; PERINEURAL PRN
Status: DISCONTINUED | OUTPATIENT
Start: 2020-11-16 | End: 2020-11-16

## 2020-11-16 RX ORDER — CEFAZOLIN SODIUM 2 G/100ML
2 INJECTION, SOLUTION INTRAVENOUS
Status: COMPLETED | OUTPATIENT
Start: 2020-11-16 | End: 2020-11-16

## 2020-11-16 RX ORDER — EPHEDRINE SULFATE 50 MG/ML
INJECTION, SOLUTION INTRAMUSCULAR; INTRAVENOUS; SUBCUTANEOUS PRN
Status: DISCONTINUED | OUTPATIENT
Start: 2020-11-16 | End: 2020-11-16

## 2020-11-16 RX ORDER — KETOROLAC TROMETHAMINE 30 MG/ML
INJECTION, SOLUTION INTRAMUSCULAR; INTRAVENOUS PRN
Status: DISCONTINUED | OUTPATIENT
Start: 2020-11-16 | End: 2020-11-16

## 2020-11-16 RX ORDER — PROPOFOL 10 MG/ML
INJECTION, EMULSION INTRAVENOUS PRN
Status: DISCONTINUED | OUTPATIENT
Start: 2020-11-16 | End: 2020-11-16

## 2020-11-16 RX ORDER — SODIUM CHLORIDE, SODIUM LACTATE, POTASSIUM CHLORIDE, CALCIUM CHLORIDE 600; 310; 30; 20 MG/100ML; MG/100ML; MG/100ML; MG/100ML
INJECTION, SOLUTION INTRAVENOUS CONTINUOUS PRN
Status: DISCONTINUED | OUTPATIENT
Start: 2020-11-16 | End: 2020-11-16

## 2020-11-16 RX ORDER — IOPAMIDOL 612 MG/ML
INJECTION, SOLUTION INTRATHECAL PRN
Status: DISCONTINUED | OUTPATIENT
Start: 2020-11-16 | End: 2020-11-16 | Stop reason: HOSPADM

## 2020-11-16 RX ORDER — SODIUM CHLORIDE, SODIUM LACTATE, POTASSIUM CHLORIDE, CALCIUM CHLORIDE 600; 310; 30; 20 MG/100ML; MG/100ML; MG/100ML; MG/100ML
INJECTION, SOLUTION INTRAVENOUS CONTINUOUS
Status: DISCONTINUED | OUTPATIENT
Start: 2020-11-16 | End: 2020-11-16 | Stop reason: HOSPADM

## 2020-11-16 RX ORDER — ONDANSETRON 2 MG/ML
4 INJECTION INTRAMUSCULAR; INTRAVENOUS EVERY 30 MIN PRN
Status: DISCONTINUED | OUTPATIENT
Start: 2020-11-16 | End: 2020-11-16 | Stop reason: HOSPADM

## 2020-11-16 RX ORDER — CEFAZOLIN SODIUM 1 G/3ML
1 INJECTION, POWDER, FOR SOLUTION INTRAMUSCULAR; INTRAVENOUS SEE ADMIN INSTRUCTIONS
Status: DISCONTINUED | OUTPATIENT
Start: 2020-11-16 | End: 2020-11-16 | Stop reason: HOSPADM

## 2020-11-16 RX ORDER — FUROSEMIDE 10 MG/ML
INJECTION INTRAMUSCULAR; INTRAVENOUS PRN
Status: DISCONTINUED | OUTPATIENT
Start: 2020-11-16 | End: 2020-11-16

## 2020-11-16 RX ORDER — ONDANSETRON 2 MG/ML
INJECTION INTRAMUSCULAR; INTRAVENOUS PRN
Status: DISCONTINUED | OUTPATIENT
Start: 2020-11-16 | End: 2020-11-16

## 2020-11-16 RX ORDER — OXYCODONE HYDROCHLORIDE 5 MG/1
5 TABLET ORAL ONCE
Status: COMPLETED | OUTPATIENT
Start: 2020-11-16 | End: 2020-11-16

## 2020-11-16 RX ADMIN — SODIUM CHLORIDE, POTASSIUM CHLORIDE, SODIUM LACTATE AND CALCIUM CHLORIDE: 600; 310; 30; 20 INJECTION, SOLUTION INTRAVENOUS at 14:39

## 2020-11-16 RX ADMIN — PROPOFOL 30 MCG/KG/MIN: 10 INJECTION, EMULSION INTRAVENOUS at 14:59

## 2020-11-16 RX ADMIN — KETOROLAC TROMETHAMINE 15 MG: 30 INJECTION, SOLUTION INTRAMUSCULAR at 16:31

## 2020-11-16 RX ADMIN — Medication 5 MG: at 15:08

## 2020-11-16 RX ADMIN — MIDAZOLAM 2 MG: 1 INJECTION INTRAMUSCULAR; INTRAVENOUS at 14:39

## 2020-11-16 RX ADMIN — FUROSEMIDE 10 MG: 10 INJECTION, SOLUTION INTRAVENOUS at 16:31

## 2020-11-16 RX ADMIN — ONDANSETRON 4 MG: 2 INJECTION INTRAMUSCULAR; INTRAVENOUS at 15:07

## 2020-11-16 RX ADMIN — OXYCODONE HYDROCHLORIDE 5 MG: 5 TABLET ORAL at 17:12

## 2020-11-16 RX ADMIN — CEFAZOLIN SODIUM 2 G: 2 INJECTION, SOLUTION INTRAVENOUS at 14:44

## 2020-11-16 RX ADMIN — Medication 5 MG: at 15:13

## 2020-11-16 RX ADMIN — PHENYLEPHRINE HYDROCHLORIDE 200 MCG: 10 INJECTION INTRAVENOUS at 14:59

## 2020-11-16 RX ADMIN — FENTANYL CITRATE 100 MCG: 50 INJECTION, SOLUTION INTRAMUSCULAR; INTRAVENOUS at 14:58

## 2020-11-16 RX ADMIN — LIDOCAINE HYDROCHLORIDE 80 MG: 20 INJECTION, SOLUTION INFILTRATION; PERINEURAL at 14:55

## 2020-11-16 RX ADMIN — PROPOFOL 200 MG: 10 INJECTION, EMULSION INTRAVENOUS at 14:55

## 2020-11-16 RX ADMIN — SODIUM CHLORIDE, POTASSIUM CHLORIDE, SODIUM LACTATE AND CALCIUM CHLORIDE: 600; 310; 30; 20 INJECTION, SOLUTION INTRAVENOUS at 16:32

## 2020-11-16 ASSESSMENT — MIFFLIN-ST. JEOR: SCORE: 1598.17

## 2020-11-16 NOTE — DISCHARGE INSTRUCTIONS
POSTOPERATIVE INSTRUCTIONS    Diagnosis-------------------------------   RIGHT kidney stones    Procedure-------------------------------  Procedure(s) (LRB):  CYSTOSCOPY WITH RIGHT RETROGRADE PYELOGRAM, RIGHT URETEROSCOPY WITH LASER LITHOTRIPSY AND BASKET REMOVAL OF STONE, RIGHT URETERAL STENT PLACEMENT. LEFT STENT REMOVAL. (Right)      Findings--------------------------------  Cystoscopy with removal of previously placed LEFT ureteral stent. RIGHT Retrograde Pyelogram with no evidence of stone in the ureter. RIGHT Ureteroscopy with evidence of significant lower pole stone burden which required laser lithotripsy and basket removal of fragments. RIGHT ureteral stent placed.     Home-going instructions-----------------         Activity Limitation:     - No driving or operating heavy machinery while on narcotic pain medication.     FOLLOW THESE INSTRUCTIONS AS INDICATED BELOW:  - Observe operative area for signs of excessive bleeding.  - You may shower.  - Increase fluid intake to promote clear urine.  - Resume usual diet as tolerated    What to expect while recovering-----------  - You may experience some intermittent bleeding that makes your urine pink or cherry colored. This is normal.  - However, if you are unable to urinate, passing large amount of clots, have brennen blood in your urine, or have a temperature >101 degrees, call the urology nurse on call, or present to your nearest emergency department.  - You are encouraged to walk daily, and have no activity restrictions.   - A URETERAL STENT has been placed that allows urine to flow unobstructed from your kidney into your bladder.  The stent has a curl in the kidney and a curl in the bladder.  The curl in the bladder can cause some urgency and frequency of urination as well as some mild blood in the urine.  The curl in the kidney can cause some mild flank discomfort.  This may be more noticeable when you urinate.  A URETERAL STENT is meant to be left in  temporarily.  It must be removed or changed no later than 3 months after it's insertion.  If it's not removed it can result in stone overgrowth on the stent that can cause pain, infection, and can be very difficult to remove.      Discharge Medications/instructions:     - Flomax (tamsulosin) to be taken daily until stent is removed    - Oxybutynin 5mg XL (Ditropan XL) to be taken daily until ureteral stent is removed     - Take Tylenol 1000mg every 6 hours for pain    - Take Oxycodone 5mg every 4-6 hours only for break through pain    - Take Colace while taking Oxycodone to prevent constipation      Questions/concerns------------------------  [facility]: [phone]    Future appointments  You will receive a call to schedule follow up for ureteral stent removal next week with Dr. Mimi Le MD       Same Day Surgery Discharge Instructions for  Sedation and General Anesthesia       It's not unusual to feel dizzy, light-headed or faint for up to 24 hours after surgery or while taking pain medication.  If you have these symptoms: sit for a few minutes before standing and have someone assist you when you get up to walk or use the bathroom.      You should rest and relax for the next 24 hours. We recommend you make arrangements to have an adult stay with you for at least 24 hours after your discharge.  Avoid hazardous and strenuous activity.      DO NOT DRIVE any vehicle or operate mechanical equipment for 24 hours following the end of your surgery.  Even though you may feel normal, your reactions may be affected by the medication you have received.      Do not drink alcoholic beverages for 24 hours following surgery.       Slowly progress to your regular diet as you feel able. It's not unusual to feel nauseated and/or vomit after receiving anesthesia.  If you develop these symptoms, drink clear liquids (apple juice, ginger ale, broth, 7-up, etc. ) until you feel better.  If your nausea  and vomiting persists for 24 hours, please notify your surgeon.        All narcotic pain medications, along with inactivity and anesthesia, can cause constipation. Drinking plenty of liquids and increasing fiber intake will help.      For any questions of a medical nature, call your surgeon.      Do not make important decisions for 24 hours.      If you had general anesthesia, you may have a sore throat for a couple of days related to the breathing tube used during surgery.  You may use Cepacol lozenges to help with this discomfort.  If it worsens or if you develop a fever, contact your surgeon.       If you feel your pain is not well managed with the pain medications prescribed by your surgeon, please contact your surgeon's office to let them know so they can address your concerns.       CoVid 19 Information    We want to give you information regarding Covid. Please consult your primary care provider with any questions you might have.     Patient who have symptoms (cough, fever, or shortness of breath), need to isolate for 7 days from when symptoms started OR 72 hours after fever resolves (without fever reducing medications) AND improvement of respiratory symptoms (whichever is longer).      Isolate yourself at home (in own room/own bathroom if possible)    Do Not allow any visitors    Do Not go to work or school    Do Not go to [employer],  centers, shopping, or other public places.    Do Not shake hands.    Avoid close and intimate contact with others (hugging, kissing).    Follow CDC recommendations for household cleaning of frequently touched services.     After the initial 7 days, continue to isolate yourself from household members as much as possible. To continue decrease the risk of community spread and exposure, you and any members of your household should limit activities in public for 14 days after starting home isolation.     You can reference the following CDC link for helpful home isolation/care  tips:  https://www.cdc.gov/coronavirus/2019-ncov/downloads/10Things.pdf    Protect Others:    Cover Your Mouth and Nose with a mask, disposable tissue or wash cloth to avoid spreading germs to others.    Wash your hands and face frequently with soap and water    Call Your Primary Doctor If: Breathing difficulty develops or you become worse.    For more information about COVID19 and options for caring for yourself at home, please visit the CDC website at https://www.cdc.gov/coronavirus/2019-ncov/about/steps-when-sick.html  For more options for care at [facility], please visit our website at [email or web address]    Today you received Toradol, an antiinflammatory medication similar to Ibuprofen.  You should not take other antiinflammatory medication, such as Ibuprofen, Motrin, Advil, Aleve, Naprosyn, etc until 10:30, PM.       Cystoscopy, Holmium Laser with Stent Placement Discharge Instructions    Holmium laser lithotripsy was used to break up your kidney stone(s). It is normal to have visible blood in the urine, burning, urgency and frequency following this procedure. These symptoms may last for a few days to weeks.     Diet:    To help pass stone fragments and clear the blood out of the urine, it is important to drink 6-8 glasses of fluids per day at home - at least 3-4 glasses should be water.       Return to the diet that you were on before the procedure, unless you are given specific diet instructions.    Activity:    Walk short distances and increase as your strength allows.    You may climb stairs.    Do not do strenuous exercise or heavy lifting until approved by surgeon.    Do not drive while taking narcotic pain medications.    Bathing:    You may take a shower.          Stent information    During surgery, a stent was placed in the ureter.  The ureter is the tube that drains urine from the kidney to the bladder.  The stent is placed to dilate (open) the ureter so the stone  fragments can pass easily through the ureter or to decrease ureteral swelling after surgery, or to relieve an obstruction. The stent is made of rubber. The upper end of the stent curls in the kidney while the lower end rests in the bladder.    While the stent is in place you may experience the following symptoms:    Blood and/or small blood clots in urine.    Bladder spasm (frequency and urgency of urination).    Discomfort or aching in the back or side where the stent is.    Burning or discomfort at the end of urine stream.    To decrease these symptoms you should:    Take pain medication as prescribed.    Drink plenty of fluids.    If you experience pain at the end of urination try not emptying your bladder completely.    If having discomfort in back or side, decrease activity.    Call your physician if these signs/symptoms are present:    Pain that is not relieved by a short rest or ordered pain medications.    Temperature at or above 101.0 F or chills.    Inability or difficulty urinating.     Excessive blood in urine.    Any questions or concerns.      **If you have questions or concerns about your procedure,   call Dr. Le at [phone]**

## 2020-11-16 NOTE — ANESTHESIA CARE TRANSFER NOTE
Patient: Los Renee    Procedure(s):  CYSTOSCOPY WITH RIGHT RETROGRADE PYELOGRAM, RIGHT URETEROSCOPY WITH LASER LITHOTRIPSY AND BASKET REMOVAL OF STONE, RIGHT URETERAL STENT PLACEMENT. LEFT STENT REMOVAL.    Diagnosis: Right kidney stone [N20.0]  Kidney stone on left side [N20.0]  Diagnosis Additional Information: No value filed.    Anesthesia Type:   General     Note:  Airway :Face Mask  Patient transferred to:PACU  Handoff Report: Identifed the Patient, Identified the Reponsible Provider, Reviewed the pertinent medical history, Discussed the surgical course, Reviewed Intra-OP anesthesia mangement and issues during anesthesia, Set expectations for post-procedure period and Allowed opportunity for questions and acknowledgement of understanding      Vitals: (Last set prior to Anesthesia Care Transfer)    CRNA VITALS  11/16/2020 1607 - 11/16/2020 1638      11/16/2020             NIBP:  (!) 154/98    Pulse:  94    NIBP Mean:  129    SpO2:  100 %    Resp Rate (observed):  (!) 4                Electronically Signed By: ROMEO Esquivel CRNA  November 16, 2020  4:38 PM

## 2020-11-16 NOTE — ANESTHESIA PREPROCEDURE EVALUATION
Anesthesia Pre-Procedure Evaluation    Patient: Los Renee   MRN: [id number] : 1950          Preoperative Diagnosis: Right kidney stone [N20.0]  Kidney stone on left side [N20.0]    Procedure(s):  CYSTOSCOPY WITH RIGHT RETROGRADE PYELOGRAM, RIGHT URETEROSCOPY WITH LASER LITHOTRIPSY AND BASKET REMOVAL OF STONE, RIGHT URETERAL STENT PLACEMENT, LEFT URETERAL STENT REMOVAL, POSSIBLE LEFT URETEROSCOPY    Past Medical History:   Diagnosis Date     Arthritis      Gastric bypass status for obesity      Gastro-oesophageal reflux disease      GI bleed 2012     GI bleed      Intermittent asthma     related to allergies- rare symptoms     Kidney stone     bilateral     Mixed hyperlipidemia 2008     Morbid obesity (H) 2008    s/p gastric bypass surgery     Morbid obesity (H) 2008     Mumps     in the early      Nephrolithiasis      SOLITARIO (obstructive sleep apnea)           Other chronic pain     chronic left knee pain     Retinopathy      Spider veins      Swelling of testicle      Type 2 diabetes mellitus (H) 2008     Unspecified essential hypertension 2008    resolved since gastric surgery     Past Surgical History:   Procedure Laterality Date     COLONOSCOPY N/A 2018    Procedure: COMBINED COLONOSCOPY, SINGLE OR MULTIPLE BIOPSY/POLYPECTOMY BY BIOPSY;  colonoscopy;  Surgeon: Max Santizo MD;  Location:  GI     CYSTOSCOPY       CYSTOSCOPY, RETROGRADES, INSERT STENT URETER(S), COMBINED Left 10/21/2020    Procedure: CYSTOSCOPY, LEFT RETROGRADE PYELOGRAM, LEFT URETEROSCOPY,LEFT URETERAL STENT PLACEMENT;  Surgeon: Asher Le MD;  Location:  OR     ESOPHAGOSCOPY, GASTROSCOPY, DUODENOSCOPY (EGD), COMBINED N/A 2020    Procedure: ESOPHAGOGASTRODUODENOSCOPY, WITH BIOPSY;  Surgeon: Neda Wheeler MD;  Location:  GI     EXTRACORPOREAL SHOCK WAVE LITHOTRIPSY, CYSTOSCOPY, INSERT STENT URETER(S), COMBINED Right 2015    Procedure: COMBINED EXTRACORPOREAL  SHOCK WAVE LITHOTRIPSY, CYSTOSCOPY, INSERT STENT URETER(S);  Surgeon: Jeovanny Ricketts MD;  Location:  OR     IR RENAL STONE REMOVAL LEFT  10/21/2020     KNEE SURGERY       LAPAROSCOPIC BYPASS GASTRIC       LASER HOLMIUM LITHOTRIPSY URETER(S), INSERT STENT, COMBINED Right 11/3/2016    Procedure: COMBINED CYSTOSCOPY, URETEROSCOPY, LASER HOLMIUM LITHOTRIPSY URETER(S), INSERT STENT;  Surgeon: Neli Freitas MD;  Location:  OR     ORTHOPEDIC SURGERY      bilat knee replaCEMENTS     PERCUTANEOUS NEPHROLITHOTOMY Left 10/21/2020    Procedure: LEFT PERCUTANEOUS NEPHROLITHOTOMY,;  Surgeon: Asher Le MD;  Location: [facility] OR     VASECTOMY         Anesthesia Evaluation     . Pt has had prior anesthetic.     No history of anesthetic complications          ROS/MED HX    ENT/Pulmonary:     (+)sleep apnea, asthma , . .    Neurologic:       Cardiovascular:     (+) hypertension----. : . . . :. .       METS/Exercise Tolerance:     Hematologic:         Musculoskeletal:         GI/Hepatic:     (+) GERD       Renal/Genitourinary:     (+) chronic renal disease,       Endo:     (+) type II DM Obesity, .      Psychiatric:         Infectious Disease:         Malignancy:         Other:                          Physical Exam  Normal systems: dental    Airway   Mallampati: II  TM distance: >3 FB  Neck ROM: full    Dental     Cardiovascular   Rhythm and rate: regular and normal      Pulmonary    breath sounds clear to auscultation            Lab Results   Component Value Date    WBC 5.0 11/16/2020    HGB 12.0 (L) 11/16/2020    HCT 37.7 (L) 11/16/2020     11/16/2020     11/16/2020    POTASSIUM 4.4 11/16/2020    CHLORIDE 108 11/16/2020    CO2 29 11/16/2020    BUN 26 11/16/2020    CR 1.58 (H) 11/16/2020     (H) 11/16/2020    CIARRA 9.1 11/16/2020    MAG 2.1 11/08/2016    ALBUMIN 3.1 (L) 09/29/2020    PROTTOTAL 6.6 (L) 09/29/2020    ALT 24 09/29/2020    AST 12 09/29/2020    ALKPHOS 77 09/29/2020    BILITOTAL  "0.3 09/29/2020    PTT 20 (L) 09/29/2020    INR 1.20 (H) 09/29/2020    TSH 2.84 01/31/2017    T4 0.82 01/31/2017       Preop Vitals  BP Readings from Last 3 Encounters:   11/16/20 130/70   11/06/20 100/58   10/22/20 116/59    Pulse Readings from Last 3 Encounters:   11/06/20 103   10/22/20 84   10/12/20 94      Resp Readings from Last 3 Encounters:   11/16/20 18   10/22/20 16   10/01/20 18    SpO2 Readings from Last 3 Encounters:   11/16/20 99%   11/06/20 98%   10/22/20 95%      Temp Readings from Last 1 Encounters:   11/16/20 35.9  C (96.6  F) (Temporal)    Ht Readings from Last 1 Encounters:   11/16/20 1.727 m (5' 8\")      Wt Readings from Last 1 Encounters:   11/16/20 85.4 kg (188 lb 3.2 oz)    Estimated body mass index is 28.62 kg/m  as calculated from the following:    Height as of this encounter: 1.727 m (5' 8\").    Weight as of this encounter: 85.4 kg (188 lb 3.2 oz).       Anesthesia Plan      History & Physical Review  History and physical reviewed and following examination; no interval change.    ASA Status:  2 .        Plan for General with Intravenous induction. Maintenance will be Balanced.             Postoperative Care  Postoperative pain management:  IV analgesics and Oral pain medications.      Consents  Anesthetic plan, risks, benefits and alternatives discussed with:  Patient..                 Chantel Alcazar"

## 2020-11-16 NOTE — ANESTHESIA CARE TRANSFER NOTE
Patient: Los Renee    Procedure(s):  CYSTOSCOPY WITH RIGHT RETROGRADE PYELOGRAM, RIGHT URETEROSCOPY WITH LASER LITHOTRIPSY AND BASKET REMOVAL OF STONE, RIGHT URETERAL STENT PLACEMENT. LEFT STENT REMOVAL.    Diagnosis: Right kidney stone [N20.0]  Kidney stone on left side [N20.0]  Diagnosis Additional Information: No value filed.    Anesthesia Type:   General     Note:  Airway :Face Mask  Patient transferred to:PACU  Handoff Report: Identifed the Patient, Identified the Reponsible Provider, Reviewed the pertinent medical history, Discussed the surgical course, Reviewed Intra-OP anesthesia mangement and issues during anesthesia, Set expectations for post-procedure period and Allowed opportunity for questions and acknowledgement of understanding      Vitals: (Last set prior to Anesthesia Care Transfer)    CRNA VITALS  11/16/2020 1607 - 11/16/2020 1637      11/16/2020             NIBP:  (!) 154/98    Pulse:  94    NIBP Mean:  129    SpO2:  100 %    Resp Rate (observed):  (!) 4                Electronically Signed By: ROMEO Esquivel CRNA  November 16, 2020  4:37 PM

## 2020-11-16 NOTE — ANESTHESIA PROCEDURE NOTES
Airway    Patient location during procedure: OR    Staff -   Anesthesiologist:  Chantel Alcazar  CRNA: Malena Mead APRN CRNA  Other Anesthesia Staff: Sylvester Reeves  Performed By: SRNA    Consent for Airway   Urgency: elective    Indications and Patient Condition  Indications for airway management: dre-procedural  Induction type:intravenousMask difficulty assessment: 1 - vent by mask    Final Airway Details  Final airway type: supraglottic airway    Endotracheal Airway Details   Secured with: pink tape    Post intubation assessment   Placement verified by: capnometry, equal breath sounds and chest rise   Number of attempts at approach: 1  Secured with:pink tape  Ease of procedure: easy  Dentition: Intact and Unchanged

## 2020-11-16 NOTE — OP NOTE
OPERATIVE REPORT  DATE OF SURGERY: 11/16/20  LOCATION OF SURGERY: [facility]  PREOPERATIVE DIAGNOSIS:  (N20.0) Right kidney stone  (primary encounter diagnosis)  (N20.0) Kidney stone on left side  POSTOPERATIVE DIAGNOSIS: (N20.0) Right kidney stone  (primary encounter diagnosis)  (N20.0) Kidney stone on left side     START TIME: 3:08 PM  END TIME: 4:32 PM    PROCEDURE PERFORMED:   1. Cystoscopy and LEFT ureteral stent removal  2. RIGHT retrograde pyelogram  3. RIGHT ureteroscopy with laser lithotripsy  4. RIGHT ureteroscopy with basketing of stones - MODIFIER 22  5. RIGHT JJ stent placement  6. >1hr physician fluoroscopy time     STAFF SURGEON: Asher Le MD  ANESTHESIA: General.   ESTIMATED BLOOD LOSS: 2 mL.   DRAINS AND TUBES: RIGHT 6fr x 24cm ureteral stent, 18fr coude catheter  COMPLICATIONS: None.   DISPOSITION: PACU.   SPECIMENS OBTAINED:   ID Type Source Tests Collected by Time Destination   1 : right kidney stones  Calculus/Stone Kidney, Right STONE ANALYSIS Asher Le MD 11/16/2020  4:35 PM      SIGNIFICANT FINDINGS: Cystoscopy with removal of previously placed LEFT ureteral stent. RIGHT Retrograde Pyelogram with no evidence of stone in the ureter. RIGHT Ureteroscopy with evidence of significant lower pole stone burden which required laser lithotripsy and basket removal of fragments. RIGHT ureteral stent placed.      HISTORY OF PRESENT ILLNESS: Los Renee is a 70-year-old man with bilateral nephrolithiasis now status post a left-sided PCNL on 10/21/2020.  Medical history significant for recent GI bleed with acute blood loss anemia and recent acute kidney injury likely from his obstructing left UPJ stone which has now been surgically treated. He presents for the above surgery.     OPERATION PERFORMED:   Informed consent was obtained and the patient was brought to the operating room where general anesthesia was induced. The patient was given appropriate preoperative antibiotics and positioned  supine. The patient was then repositioned in dorsal lithotomy with all pressure points padded. We then performed a timeout, verifying the correct patient's site and procedure to be performed.    A 22fr cystoscope was inserted atraumatically into the bladder. Complete cystoscopy was performed with no evidence of a stone within the bladder. The previously placed LEFT ureteral stent was grasped and removed in entirety. The cystoscope was replaced in the bladder. The RIGHT UO was identified and cannulated with a 0.035 Sensor wire which was advanced up the RIGHT kidney under fluoroscopic guidance. The cystoscope was removed. A 10fr dual lumen catheter was advanced over the wire to the mid ureter and a gentle Retrograde Pyelogram was completed with no evidence of stone in the ureter.  An Amplatz Super-stiff wire was advanced up to the renal pelvis and the dual lumen was removed. A 11/13 46cm ureteral access sheath was advanced over the super-stiff wire under fluoroscopic guidance to the level of the UPJ and the wire and the inner sheath were removed. The flexible ureteroscope was assembled and advanced up the renal pelvis and complete pyeloscopy was performed. The significant stone burden was identified in the lower pole of the kidney. The large 12x9 as well as several other ~4-6mm stones were identified. The stones were fragmented with the laser fiber. All fragments >1-2mm were basketed and removed - this was extremely challenging due to the large stone volume and required >50 basket retrievals - Modifier 22. The scope and the sheath were removed en bloc with complete visualization of the ureter. There was no evidence of ureteral injury. A 6fr x 24cm JJ stent was advanced over the Sensor wire with good curl noted in the renal pelvis and in the bladder fluoroscopically. A 18fr chen was placed. He received 10mg IV lasix and 15mg IV Toradol.  The patient was emerged from anesthesia and recovered in the PACU.      Asher ERNST  MD Mimi   Urology  [facility]  Clinic Phone [phone]

## 2020-11-17 LAB — COPATH REPORT: NORMAL

## 2020-11-17 NOTE — OR NURSING
Mando steiner d/lisha per orders- PNDS met, po per I&O sheet. Pt dressed, up in recliner and transported to Phase 2. Dr Le at beside talking to pt. Discharge instructions by phone per Hospital COVID Protocol w wife Nallely Miranda- pt and wife verbalize understanding, denies questions- waiting for wife for discharge to home.

## 2020-11-17 NOTE — ANESTHESIA POSTPROCEDURE EVALUATION
Patient: Los Renee    Procedure(s):  CYSTOSCOPY WITH RIGHT RETROGRADE PYELOGRAM, RIGHT URETEROSCOPY WITH LASER LITHOTRIPSY AND BASKET REMOVAL OF STONE, RIGHT URETERAL STENT PLACEMENT. LEFT STENT REMOVAL.    Diagnosis:Right kidney stone [N20.0]  Kidney stone on left side [N20.0]  Diagnosis Additional Information: No value filed.    Anesthesia Type:  General    Note:  Anesthesia Post Evaluation    Patient location during evaluation: PACU  Patient participation: Able to fully participate in evaluation  Level of consciousness: awake  Pain management: adequate  Airway patency: patent  Cardiovascular status: acceptable  Respiratory status: acceptable  Hydration status: acceptable  PONV: none     Anesthetic complications: None          Last vitals:  Vitals:    11/16/20 1730 11/16/20 1745 11/16/20 1800   BP: 137/67 (!) 144/76 135/73   Pulse: 81 80 79   Resp: 16 16 16   Temp:      SpO2: 98% 99% 98%         Electronically Signed By: Chantel Alcazar  November 16, 2020  7:14 PM

## 2020-11-25 ENCOUNTER — OFFICE VISIT (OUTPATIENT)
Dept: UROLOGY | Facility: CLINIC | Age: 70
End: 2020-11-25
Payer: COMMERCIAL

## 2020-11-25 VITALS
HEART RATE: 92 BPM | DIASTOLIC BLOOD PRESSURE: 80 MMHG | OXYGEN SATURATION: 98 % | BODY MASS INDEX: 28.79 KG/M2 | WEIGHT: 190 LBS | HEIGHT: 68 IN | SYSTOLIC BLOOD PRESSURE: 130 MMHG

## 2020-11-25 DIAGNOSIS — N20.0 RIGHT KIDNEY STONE: Primary | ICD-10-CM

## 2020-11-25 DIAGNOSIS — N20.9 URIC ACID STONE IN URINE: ICD-10-CM

## 2020-11-25 DIAGNOSIS — Z46.6 ENCOUNTER FOR REMOVAL OF URETERAL STENT: ICD-10-CM

## 2020-11-25 LAB
APPEARANCE STONE: NORMAL
COMPN STONE: NORMAL
NUMBER STONE: NORMAL
SIZE STONE: NORMAL MM
WT STONE: 500 MG

## 2020-11-25 PROCEDURE — 52310 CYSTOSCOPY AND TREATMENT: CPT | Mod: 58 | Performed by: UROLOGY

## 2020-11-25 RX ORDER — CIPROFLOXACIN 500 MG/1
500 TABLET, FILM COATED ORAL ONCE
Qty: 1 TABLET | Refills: 0 | Status: CANCELLED | OUTPATIENT
Start: 2020-11-25 | End: 2020-11-25

## 2020-11-25 RX ORDER — LIDOCAINE HYDROCHLORIDE 20 MG/ML
JELLY TOPICAL ONCE
Status: DISCONTINUED | OUTPATIENT
Start: 2020-11-25 | End: 2020-11-25 | Stop reason: HOSPADM

## 2020-11-25 ASSESSMENT — PAIN SCALES - GENERAL: PAINLEVEL: MILD PAIN (2)

## 2020-11-25 ASSESSMENT — MIFFLIN-ST. JEOR: SCORE: 1606.33

## 2020-11-25 NOTE — PATIENT INSTRUCTIONS
"AFTER YOUR CYSTOSCOPY  ?  ?  You have just completed a cystoscopy, or \"cysto\", which allowed your physician to learn more about your bladder (or to remove a stent placed after surgery). We suggest that you continue to avoid caffeine, fruit juice, and alcohol for the next 24 hours, however, you are encouraged to return to your normal activities.  ?  ?  A few things that are considered normal after your cystoscopy:  ?  * small amount of bleeding (or spotting) that clears within the next 24 hours  ?  * slight burning sensation with urination  ?  * sensation of needing to void (urinate) more frequently  ?  * the feeling of \"air\" in your urine  ?  * mild discomfort that is relieved with Tylenol    * bladder spasms  ?  ?  ?  Please contact our office promptly if you:  ?  * develop a fever above 101 degrees  ?  * are unable to urinate  ?  * develop bright red blood that does not stop  ?  * experience severe pain or swelling  ?  ?  ?  And of course, please contact our office with any concerns or questions [phone]  ?    AFTER YOUR CYSTOSCOPY        You have just completed a cystoscopy, or \"cysto\", which allowed your physician to learn more about your bladder (or to remove a stent placed after surgery). We suggest that you continue to avoid caffeine, fruit juice, and alcohol for the next 24 hours, however, you are encouraged to return to your normal activities.         A few things that are considered normal after your cystoscopy:     * Small amount of bleeding (or spotting) that clears within the next 24 hours     * Slight burning sensation with urination     * Sensation to of needing to avoid more frequently     * The feeling of \"air\" in your urine     * Mild discomfort that is relieved with Tylenol        Please contact our office promptly if you:     * Develop a fever above 101 degrees     * Are unable to urinate     * Develop bright red blood that does not stop     * Severe pain or swelling         Please contact " our office with any concerns or questions @[facility].

## 2020-11-25 NOTE — LETTER
11/25/2020       RE: Los Renee  [address]  [facility] [address]     Dear Colleague,    Thank you for referring your patient, Los Renee, to the [facility] [place] at [facility]. Please see a copy of my visit note below.    CYSTOSCOPY AND URETERAL STENT REMOVAL PROCEDURE NOTE:    Los Renee is a 70 year old male  who presents with ureteral stent for cystoscopy and ureteral stent removal.    Pt ID verified with patient: Yes     Procedure verified with patient: Yes     Procedure confirmed with physician and support staff: Yes     Consent form confirmed with physician and support staff.    Sign In  History and Physical Exam reviewed and Yes .  Informed Consent Discussed: Yes   Sign in Communication: Yes   Time Out:  Team Confirms the Correct Patient, Correct Procedure;  , Correct Site and Site Marking, Correct Position (if applicable).    Affirmation of Time Out: Yes   Sign Out:  Sign Out Discussion: Yes     Los Renee is a 70 year old male with an indwelling ureteral stent in need of removal.    CYSTOSCOPY PROCEDURE:  After sterile preparation and draping of the patient,  a 17-Mongolian flexible cystoscope was introduced via the urethra.  It was passed without difficulty into the bladder.  The urethra was open without evidence of stricture.  The ureteral orifices were orthotopic.  The double J stent was seen coming out the right side.  It was grasped with an alligator forceps and extracted intact without difficulty.  The patient tolerated the procedure well    STONE ANALYSIS:  RIGHT KIDNEY 11/16/20:  Calculi composed primarily of:   20% calcium oxalate monohydrate, and   80% uric acid.     LEFT KIDNEY 10/21/20:  Calculi composed primarily of:   20% calcium oxalate dihydrate, and   80% uric acid.     A/P Successful stent removal  Prophylactic antibiotic ordered   Stone prevention counseling provided today  - He is on KCit BID  - He  reports no prior 24-hour Litholinks  - F/U in 3 months after two 24-hour Litholink    Watch for any new onset fevers, signs of UTI.  May expect some pain after removal.  If this is severe, or last many hours, you may need to return for replacement of stent.    Asher Le MD   Urology  [facility]

## 2020-11-25 NOTE — NURSING NOTE
Chief Complaint   Patient presents with     right kidney stone     5mL 2% lidocaine hydrochloride Urojet instilled into urethra.    NDC# [address]-0  Lot #: [id number]   Expiration Date:  6-22    Chantelle Galvan

## 2020-11-25 NOTE — PROGRESS NOTES
CYSTOSCOPY AND URETERAL STENT REMOVAL PROCEDURE NOTE:    Los Renee is a 70 year old male  who presents with ureteral stent for cystoscopy and ureteral stent removal.    Pt ID verified with patient: Yes     Procedure verified with patient: Yes     Procedure confirmed with physician and support staff: Yes     Consent form confirmed with physician and support staff.    Sign In  History and Physical Exam reviewed and Yes .  Informed Consent Discussed: Yes   Sign in Communication: Yes   Time Out:  Team Confirms the Correct Patient, Correct Procedure;  , Correct Site and Site Marking, Correct Position (if applicable).    Affirmation of Time Out: Yes   Sign Out:  Sign Out Discussion: Yes     Los Renee is a 70 year old male with an indwelling ureteral stent in need of removal.    CYSTOSCOPY PROCEDURE:  After sterile preparation and draping of the patient,  a 17-Amharic flexible cystoscope was introduced via the urethra.  It was passed without difficulty into the bladder.  The urethra was open without evidence of stricture.  The ureteral orifices were orthotopic.  The double J stent was seen coming out the right side.  It was grasped with an alligator forceps and extracted intact without difficulty.  The patient tolerated the procedure well    STONE ANALYSIS:  RIGHT KIDNEY 11/16/20:  Calculi composed primarily of:   20% calcium oxalate monohydrate, and   80% uric acid.     LEFT KIDNEY 10/21/20:  Calculi composed primarily of:   20% calcium oxalate dihydrate, and   80% uric acid.     A/P Successful stent removal  Prophylactic antibiotic ordered   Stone prevention counseling provided today  - He is on KCit BID  - He reports no prior 24-hour Litholinks  - F/U in 3 months after two 24-hour Litholink    Watch for any new onset fevers, signs of UTI.  May expect some pain after removal.  If this is severe, or last many hours, you may need to return for replacement of stent.    Asher Le MD   Urology  [facility]  [facility]

## 2020-12-25 ENCOUNTER — HOSPITAL ENCOUNTER (OUTPATIENT)
Facility: CLINIC | Age: 70
Setting detail: OBSERVATION
Discharge: HOME | End: 2020-12-26
Attending: PHYSICIAN ASSISTANT | Admitting: INTERNAL MEDICINE
Payer: COMMERCIAL

## 2020-12-25 DIAGNOSIS — Z98.84 HISTORY OF GASTRIC BYPASS: ICD-10-CM

## 2020-12-25 DIAGNOSIS — K92.2 UPPER GI BLEED: ICD-10-CM

## 2020-12-25 DIAGNOSIS — K92.2 ACUTE UPPER GI BLEED: ICD-10-CM

## 2020-12-25 LAB
ABO + RH BLD: NORMAL
ABO + RH BLD: NORMAL
ALBUMIN SERPL-MCNC: 3.8 G/DL (ref 3.4–5)
ALP SERPL-CCNC: 100 U/L (ref 40–150)
ALT SERPL W P-5'-P-CCNC: 34 U/L (ref 0–70)
ANION GAP SERPL CALCULATED.3IONS-SCNC: 3 MMOL/L (ref 3–14)
AST SERPL W P-5'-P-CCNC: 22 U/L (ref 0–45)
BASOPHILS # BLD AUTO: 0 10E9/L (ref 0–0.2)
BASOPHILS NFR BLD AUTO: 0.6 %
BILIRUB SERPL-MCNC: 0.3 MG/DL (ref 0.2–1.3)
BLD GP AB SCN SERPL QL: NORMAL
BLD PROD TYP BPU: NORMAL
BLOOD BANK CMNT PATIENT-IMP: NORMAL
BUN SERPL-MCNC: 31 MG/DL (ref 7–30)
CALCIUM SERPL-MCNC: 8.6 MG/DL (ref 8.5–10.1)
CHLORIDE SERPL-SCNC: 105 MMOL/L (ref 94–109)
CO2 SERPL-SCNC: 28 MMOL/L (ref 20–32)
CREAT SERPL-MCNC: 1.35 MG/DL (ref 0.66–1.25)
DIFFERENTIAL METHOD BLD: ABNORMAL
EOSINOPHIL # BLD AUTO: 0.1 10E9/L (ref 0–0.7)
EOSINOPHIL NFR BLD AUTO: 1 %
ERYTHROCYTE [DISTWIDTH] IN BLOOD BY AUTOMATED COUNT: 13.5 % (ref 10–15)
ERYTHROCYTE [DISTWIDTH] IN BLOOD BY AUTOMATED COUNT: 13.7 % (ref 10–15)
GFR SERPL CREATININE-BSD FRML MDRD: 53 ML/MIN/{1.73_M2}
GLUCOSE BLDC GLUCOMTR-MCNC: 176 MG/DL (ref 70–99)
GLUCOSE BLDC GLUCOMTR-MCNC: 97 MG/DL (ref 70–99)
GLUCOSE SERPL-MCNC: 228 MG/DL (ref 70–99)
HBA1C MFR BLD: 7.2 % (ref 0–5.6)
HCT VFR BLD AUTO: 25.8 % (ref 40–53)
HCT VFR BLD AUTO: 30.6 % (ref 40–53)
HEMOCCULT STL QL: POSITIVE
HGB BLD-MCNC: 8.6 G/DL (ref 13.3–17.7)
HGB BLD-MCNC: 9.8 G/DL (ref 13.3–17.7)
IMM GRANULOCYTES # BLD: 0 10E9/L (ref 0–0.4)
IMM GRANULOCYTES NFR BLD: 0.2 %
INR PPP: 1.01 (ref 0.86–1.14)
INTERPRETATION ECG - MUSE: NORMAL
LABORATORY COMMENT REPORT: NORMAL
LYMPHOCYTES # BLD AUTO: 1.1 10E9/L (ref 0.8–5.3)
LYMPHOCYTES NFR BLD AUTO: 22.4 %
MCH RBC QN AUTO: 28.3 PG (ref 26.5–33)
MCH RBC QN AUTO: 29.2 PG (ref 26.5–33)
MCHC RBC AUTO-ENTMCNC: 32 G/DL (ref 31.5–36.5)
MCHC RBC AUTO-ENTMCNC: 33.3 G/DL (ref 31.5–36.5)
MCV RBC AUTO: 88 FL (ref 78–100)
MCV RBC AUTO: 88 FL (ref 78–100)
MONOCYTES # BLD AUTO: 0.4 10E9/L (ref 0–1.3)
MONOCYTES NFR BLD AUTO: 7.5 %
NEUTROPHILS # BLD AUTO: 3.5 10E9/L (ref 1.6–8.3)
NEUTROPHILS NFR BLD AUTO: 68.3 %
NRBC # BLD AUTO: 0 10*3/UL
NRBC BLD AUTO-RTO: 0 /100
NUM BPU REQUESTED: 2
PLATELET # BLD AUTO: 166 10E9/L (ref 150–450)
PLATELET # BLD AUTO: 201 10E9/L (ref 150–450)
POTASSIUM SERPL-SCNC: 4 MMOL/L (ref 3.4–5.3)
PROT SERPL-MCNC: 7.6 G/DL (ref 6.8–8.8)
RBC # BLD AUTO: 2.95 10E12/L (ref 4.4–5.9)
RBC # BLD AUTO: 3.46 10E12/L (ref 4.4–5.9)
SARS-COV-2 RNA SPEC QL NAA+PROBE: NEGATIVE
SODIUM SERPL-SCNC: 136 MMOL/L (ref 133–144)
SPECIMEN EXP DATE BLD: NORMAL
SPECIMEN SOURCE: NORMAL
TROPONIN I SERPL-MCNC: <0.015 UG/L (ref 0–0.04)
WBC # BLD AUTO: 4.3 10E9/L (ref 4–11)
WBC # BLD AUTO: 4.9 10E9/L (ref 4–11)

## 2020-12-25 PROCEDURE — 96374 THER/PROPH/DIAG INJ IV PUSH: CPT

## 2020-12-25 PROCEDURE — 250N000011 HC RX IP 250 OP 636: Performed by: HOSPITALIST

## 2020-12-25 PROCEDURE — C9803 HOPD COVID-19 SPEC COLLECT: HCPCS

## 2020-12-25 PROCEDURE — 999N001017 HC STATISTIC GLUCOSE BY METER IP

## 2020-12-25 PROCEDURE — 250N000013 HC RX MED GY IP 250 OP 250 PS 637: Performed by: HOSPITALIST

## 2020-12-25 PROCEDURE — 86901 BLOOD TYPING SEROLOGIC RH(D): CPT | Performed by: EMERGENCY MEDICINE

## 2020-12-25 PROCEDURE — 99285 EMERGENCY DEPT VISIT HI MDM: CPT | Mod: 25

## 2020-12-25 PROCEDURE — C9113 INJ PANTOPRAZOLE SODIUM, VIA: HCPCS | Performed by: HOSPITALIST

## 2020-12-25 PROCEDURE — 93005 ELECTROCARDIOGRAM TRACING: CPT

## 2020-12-25 PROCEDURE — 99222 1ST HOSP IP/OBS MODERATE 55: CPT | Mod: AI | Performed by: HOSPITALIST

## 2020-12-25 PROCEDURE — 87635 SARS-COV-2 COVID-19 AMP PRB: CPT | Performed by: PHYSICIAN ASSISTANT

## 2020-12-25 PROCEDURE — 86922 COMPATIBILITY TEST ANTIGLOB: CPT | Performed by: EMERGENCY MEDICINE

## 2020-12-25 PROCEDURE — 83036 HEMOGLOBIN GLYCOSYLATED A1C: CPT | Performed by: EMERGENCY MEDICINE

## 2020-12-25 PROCEDURE — 80053 COMPREHEN METABOLIC PANEL: CPT | Performed by: EMERGENCY MEDICINE

## 2020-12-25 PROCEDURE — 258N000003 HC RX IP 258 OP 636: Performed by: PHYSICIAN ASSISTANT

## 2020-12-25 PROCEDURE — 84484 ASSAY OF TROPONIN QUANT: CPT | Performed by: EMERGENCY MEDICINE

## 2020-12-25 PROCEDURE — 250N000012 HC RX MED GY IP 250 OP 636 PS 637: Performed by: HOSPITALIST

## 2020-12-25 PROCEDURE — C9113 INJ PANTOPRAZOLE SODIUM, VIA: HCPCS | Performed by: PHYSICIAN ASSISTANT

## 2020-12-25 PROCEDURE — 36415 COLL VENOUS BLD VENIPUNCTURE: CPT | Performed by: HOSPITALIST

## 2020-12-25 PROCEDURE — 85610 PROTHROMBIN TIME: CPT | Performed by: EMERGENCY MEDICINE

## 2020-12-25 PROCEDURE — 86850 RBC ANTIBODY SCREEN: CPT | Performed by: EMERGENCY MEDICINE

## 2020-12-25 PROCEDURE — 258N000003 HC RX IP 258 OP 636: Performed by: HOSPITALIST

## 2020-12-25 PROCEDURE — 120N000001 HC R&B MED SURG/OB

## 2020-12-25 PROCEDURE — 85025 COMPLETE CBC W/AUTO DIFF WBC: CPT | Performed by: EMERGENCY MEDICINE

## 2020-12-25 PROCEDURE — 96361 HYDRATE IV INFUSION ADD-ON: CPT

## 2020-12-25 PROCEDURE — 86900 BLOOD TYPING SEROLOGIC ABO: CPT | Performed by: EMERGENCY MEDICINE

## 2020-12-25 PROCEDURE — 85027 COMPLETE CBC AUTOMATED: CPT | Mod: 91 | Performed by: HOSPITALIST

## 2020-12-25 PROCEDURE — 96372 THER/PROPH/DIAG INJ SC/IM: CPT | Mod: XS | Performed by: HOSPITALIST

## 2020-12-25 PROCEDURE — 250N000011 HC RX IP 250 OP 636: Performed by: PHYSICIAN ASSISTANT

## 2020-12-25 PROCEDURE — 82272 OCCULT BLD FECES 1-3 TESTS: CPT | Performed by: PHYSICIAN ASSISTANT

## 2020-12-25 RX ORDER — TAMSULOSIN HYDROCHLORIDE 0.4 MG/1
0.4 CAPSULE ORAL DAILY
Status: DISCONTINUED | OUTPATIENT
Start: 2020-12-25 | End: 2020-12-26 | Stop reason: HOSPADM

## 2020-12-25 RX ORDER — DEXTROSE MONOHYDRATE 25 G/50ML
25-50 INJECTION, SOLUTION INTRAVENOUS
Status: DISCONTINUED | OUTPATIENT
Start: 2020-12-25 | End: 2020-12-26 | Stop reason: HOSPADM

## 2020-12-25 RX ORDER — NICOTINE POLACRILEX 4 MG
15-30 LOZENGE BUCCAL
Status: DISCONTINUED | OUTPATIENT
Start: 2020-12-25 | End: 2020-12-26 | Stop reason: HOSPADM

## 2020-12-25 RX ORDER — SIMVASTATIN 20 MG
20 TABLET ORAL AT BEDTIME
Status: DISCONTINUED | OUTPATIENT
Start: 2020-12-25 | End: 2020-12-26 | Stop reason: HOSPADM

## 2020-12-25 RX ORDER — SODIUM CHLORIDE 9 MG/ML
INJECTION, SOLUTION INTRAVENOUS CONTINUOUS
Status: DISCONTINUED | OUTPATIENT
Start: 2020-12-25 | End: 2020-12-26

## 2020-12-25 RX ORDER — LIDOCAINE 40 MG/G
CREAM TOPICAL
Status: DISCONTINUED | OUTPATIENT
Start: 2020-12-25 | End: 2020-12-26 | Stop reason: HOSPADM

## 2020-12-25 RX ADMIN — SODIUM CHLORIDE: 9 INJECTION, SOLUTION INTRAVENOUS at 14:41

## 2020-12-25 RX ADMIN — PANTOPRAZOLE SODIUM 40 MG: 40 INJECTION, POWDER, FOR SOLUTION INTRAVENOUS at 11:23

## 2020-12-25 RX ADMIN — SIMVASTATIN 20 MG: 20 TABLET, FILM COATED ORAL at 23:03

## 2020-12-25 RX ADMIN — TAMSULOSIN HYDROCHLORIDE 0.4 MG: 0.4 CAPSULE ORAL at 14:53

## 2020-12-25 RX ADMIN — INSULIN ASPART 1 UNITS: 100 INJECTION, SOLUTION INTRAVENOUS; SUBCUTANEOUS at 18:19

## 2020-12-25 RX ADMIN — PANTOPRAZOLE SODIUM 40 MG: 40 INJECTION, POWDER, FOR SOLUTION INTRAVENOUS at 23:03

## 2020-12-25 RX ADMIN — SODIUM CHLORIDE 1000 ML: 9 INJECTION, SOLUTION INTRAVENOUS at 12:01

## 2020-12-25 ASSESSMENT — ACTIVITIES OF DAILY LIVING (ADL)
ADLS_ACUITY_SCORE: 11
ADLS_ACUITY_SCORE: 11

## 2020-12-25 ASSESSMENT — ENCOUNTER SYMPTOMS
FEVER: 0
BLOOD IN STOOL: 1
SORE THROAT: 0
CHILLS: 0
DIARRHEA: 0
SHORTNESS OF BREATH: 1
ABDOMINAL PAIN: 1
COUGH: 0
CONSTIPATION: 0
FATIGUE: 1

## 2020-12-25 ASSESSMENT — MIFFLIN-ST. JEOR: SCORE: 1606.33

## 2020-12-25 NOTE — ED NOTES
"[facility]  ED Nurse Handoff Report    ED Chief complaint: Rectal Bleeding      ED Diagnosis:   Final diagnoses:   Upper GI bleed   History of gastric bypass       Code Status: To be addressed by admitting provider    Allergies:   Allergies   Allergen Reactions     Blood Transfusion Related (Informational Only) Other (See Comments)     Patient has a history of a clinically significant antibody against RBC antigens.  A delay in compatible RBCs may occur.     Simvastatin Nausea       Patient Story: Pt reports feeling more fatigued yesterday and noticed black stool this morning.    Focused Assessment:  A&Ox4. Denies fevers, cough, sob, cp, n/v/d. Denies pain. Hypertensive, otherwise VSS. Independent with cares & ambulation. Calm and cooperative on cart. Pt reports being NPO since last night.    Treatments and/or interventions provided:   Medications   0.9% sodium chloride BOLUS (1,000 mLs Intravenous New Bag 12/25/20 1201)   pantoprazole (PROTONIX) IV push injection 40 mg (40 mg Intravenous Given 12/25/20 1123)       Patient's response to treatments and/or interventions: resting    To be done/followed up on inpatient unit:  Continue with plan of care per admitting MD.    Does this patient have any cognitive concerns?: NA    Activity level - Baseline/Home:  Independent  Activity Level - Current:   Independent    Patient's Preferred language: English   Needed?: No    Isolation: None  Infection: Not Applicable  Patient tested for COVID 19 prior to admission: YES  Bariatric?: No    Vital Signs:   Vitals:    12/25/20 1011   BP: (!) 153/44   Pulse: 98   Resp: 16   Temp: 96.9  F (36.1  C)   TempSrc: Temporal   SpO2: 99%   Weight: 86.2 kg (190 lb)   Height: 1.727 m (5' 8\")       Cardiac Rhythm:     Was the PSS-3 completed:   Yes  What interventions are required if any?               Family Comments: None present  OBS brochure/video discussed/provided to patient/family: N/A        For the majority of " the shift this patient's behavior was Green.   Behavioral interventions performed were NA.    ED NURSE PHONE NUMBER: *[id number]

## 2020-12-25 NOTE — ED TRIAGE NOTES
States noticed gen weakness yesterday and dark stool this AM.  States mild abdominal pain.   Hx gastric bypass 10 years ago and had internal bleeding then but nothing since.

## 2020-12-25 NOTE — PROGRESS NOTES
RECEIVING UNIT ED HANDOFF REVIEW    ED Nurse Handoff Report was reviewed by: Crystal Douglass RN on December 25, 2020 at 1:11 PM

## 2020-12-25 NOTE — ED PROVIDER NOTES
History   Chief Complaint  Rectal Bleeding    HPI   Los Renee is a 70 year old male with a history of gastric bypass surgery, GI bleeds, kidney stones, GERD, hypertension, and type 2 diabetes mellitus who presents for evaluation of rectal bleeding this morning and fatigue that onset yesterday. Los notes that he was experiencing dypsnea on exertion while shoveling snow yesterday and notes a bout of lightheadedness that onset after climbing the stairs at home both of which are unusual for him. He denies any blood in stool yesterday, but notes having a black stool this morning. Los denies any alcohol. He denies any aspirin or ibuprofen use. He denies any spicy foods. He denies any blood thinner use. He has not had his medications today. Los also explains that he was taken off his hypertension medications despite his high blood pressures here this morning, and notes that he was prescribed Protonix, however he has run out of this medication and has not followed up with his primary for a refill. He denies any chest pain, cough, fever, or chills. He denies any urinary symptoms. Los endorses some abdominal pain. He denies any sore throat or congestion. The patient notes his stools to be well formed.    Per chart andres, Hx of upper gi bleeds with admission here 3 months ago. At that time, his hemoglobin was 6.8 which improved to 8.6 prior to discharge from his admission. During his admission, he received a EGD where he was found to have a gastrojejunal anastomosis ulceration and his gastric bypass are showed intact staple line.      Allergies  Blood Transfusion Related (Informational Only)  Simvastatin    Medications  Amaryl  Protonix  Flomax  Zocor    Past Medical History  Arthritis  Gastro-esophageal reflux disease  GI bleeds  Intermittent asthma  Hyperlipidemia  Morbid obesity  Mumps  Nephrolithiasis  SOLITARIO  Retinopathy  Spider veins  Type 2 diabetes mellitus  hypertension    Past Surgical  "History  Colonoscopy  Cystoscopy  Gastric bypass 10 years ago  EGD  Renal stone removal left  Bilateral knee replacements  Vasectomy  Left percutaneous nephrolithotomy    Family History  CAD  Diabetes  Breast cancer  Cerebrovascular disease  Alcohol/drug abuse  Eye disorder  Heart disease  Leukemia    Social History  Tobacco use: never smoker  Alcohol use: no  Drug use: no  Marital Status:   PCP: Art Cheung  Occupational History: special     Review of Systems   Constitutional: Positive for fatigue. Negative for chills and fever.   HENT: Negative for congestion and sore throat.    Respiratory: Positive for shortness of breath (TRUJILLO). Negative for cough.    Cardiovascular: Negative for chest pain.   Gastrointestinal: Positive for abdominal pain and blood in stool. Negative for constipation and diarrhea.   All other systems reviewed and are negative.    Physical Exam     Patient Vitals for the past 24 hrs:   BP Temp Temp src Pulse Resp SpO2 Height Weight   12/25/20 1011 (!) 153/44 96.9  F (36.1  C) Temporal 98 16 99 % 1.727 m (5' 8\") 86.2 kg (190 lb)       Physical Exam   General: Alert and interactive. Appears well. Cooperative and pleasant.   Eyes: The pupils are equal and round. EOMs intact. No scleral icterus.  ENT: No abnormalities to the external nose or ears. Mucous membranes moist. Posterior oropharynx is non-erythematous.    Neck: Trachea is in the midline. No nuchal rigidity.     CV: Regular rate and rhythm. S1 and S2 normal without murmur, click, gallop or rub.   Resp: Breath sounds are clear bilaterally, without rhonchi, wheezes, rales. Non-labored, no retractions or accessory muscle use.     GI: Abdomen is soft without distension. No tenderness to palpation. No peritoneal signs.    Rectal: Melena at rectum. No obvious hemorrhoids or fissures. No BRBPR.  MS: Moving all extremities well. Good muscle tone.   Skin: Warm and dry. No rash or lesions noted.  Neuro: Alert and oriented x 3. " No focal neurologic deficits. Good strength and sensation in upper and lower extremities.   Psych: Awake. Alert.  Normal affect. Appropriate interactions.  Lymph: No anterior or posterior cervical lymphadenopathy noted.    Emergency Department Course   EKG  Indication: Fatigue  Time: 1124  Rate 79 bpm. WI interval 142. QRS duration 70. QT/QTc 376/431.   Normal sinus rhythm  Normal ECG   No significant change as compared to prior, dated 10/21/2020.  Read time: 1130  Read by Wandy Ngo PA-C and Dr. Crystal Islas MD    Laboratory:  Laboratory findings were communicated with the patient who voiced understanding of the findings.    CBC: WBC: 4.39, HGB: 9.8 (low), PLT: 201  CMP: Glucose 228 (high), Urea Nitrogen: 31 (high), GFR Estimate: 53 (low), o/w WNL (Creatinine: 1.35 (high))    ABO: O, Rh(D): Neg, Antibody: Neg  Troponin I (Collected at 1018): <0.015    Occult Blood Stool: Positive (A)    Emergency Department Course:  Reviewed:  1050 I reviewed the patient's nursing notes, vitals, past medical records, Care Everywhere.     Assessments:  1055 I physically examined the patient as documented above.  1110 I performed a chaperoned rectal exam.  1205 I re-assessed the patient and updated them on the results of their workup thus far.    Interventions:  1123 Protonix 40 mg IV  1201 NS 1L IV    Disposition:  1142 The patient was admitted to the hospital under the care of Dr. Dwain Vo.     Impression & Plan   Covid-19  Los Renee was evaluated during a global COVID-19 pandemic, which necessitated consideration that the patient might be at risk for infection with the SARS-CoV-2 virus that causes COVID-19.   Applicable protocols for evaluation were followed during the patient's care.   COVID-19 was considered as part of the patient's evaluation. The plan for testing is:  a test was obtained during this visit.    Medical Decision Making:  Los Renee is a 70 year old male who presents with melena and  dyspnea on exertion. Patient has a history of an upper GI bleed from a gastric bypass anastomosis. He has been off of his Protonix for the past month and noticed melena this morning. He has melena on exam and is Hemoccult positive.  Hemoglobin went from 12 in November to 9.8 today. He is not on anticoagulation. Patient without chest pain, and symptoms are much more consistent with upper GI bleed. Troponin negative. EKG without ischemic changes to suggest ACS. Lower likelihood of PE or other cardiopulmonary etiology given history of GIB. Creatinine is stable. Abdomen is non-tender. Patient with DMII and hyperglycemia but no other metabolic complications. He is vitally stable and does not yet require blood transfusion. Stable for admission. Protonix given. Discussed with Dr. Vo, who will admit to medical bed for further evaluation and treatment.     Diagnosis:    ICD-10-CM    1. Upper GI bleed  K92.2 Asymptomatic SARS-CoV-2 COVID-19 Virus (Coronavirus) by PCR   2. History of gastric bypass  Z98.84        Disposition:  Admitted to Dr. Vo    Scribe Disclosure:  Giacomo TURNER, am serving as a scribe at 10:53 AM on 12/25/2020 to document services personally performed by Wandy Ngo PA-C based on my observations and the provider's statements to me.      Wandy Ngo PA-C  12/25/20 [id number]

## 2020-12-25 NOTE — H&P
[facility]    History and Physical - Internal Medicine Service        Date of Admission:  12/25/2020    Assessment & Plan   Los Renee is a 70 year old male admitted on 12/25/2020. He has a PMH most remarkable for gastric bypass (2012) with subsequent upper GI bleed related to anastomic ulceration (2020) who presents with fatigue on exertion and anemia.     1. Acute Normocytic Anemia. Probably related to repeat bleeding (upper GI) from anastomotic ulceration as he is currently not on a PPI anymore.  - GI consult for further evaluation  - IV PPI Q12 hours  - NPO at midnight in case of upper endoscopy; might be OK to just resume PPI and discharge, will defer to GI  - Discussed with patient he is OK with procedure and blood products if necessary  - CLD until midnight, NPO at midnight    Chronic Issues   1. DM2 - Ordered sliding scale insulin, held glimepiride  2. HLD - Home simvastatin  3. BPH - Home tamsulosin     Diet: NPO for Medical/Clinical Reasons Except for: Meds  Clear Liquid Diet    Fluids: 100 ml/hr until tomorrow  DVT Prophylaxis: Pneumatic Compression Devices  Gilmore Catheter: not present  Code Status: No CPR- Do NOT Intubate           Disposition Plan   Expected discharge: Tomorrow, recommended to prior living arrangement once GI Evaluation.  Entered: Dwain Vo MD 12/25/2020, 4:14 PM       The patient's care was discussed with the Patient.    Dwain Vo MD PhD  Internal Medicine Service  [facility]  Contact information available via [facility] Paging/Directory    ______________________________________________________________________    Chief Complaint   Fatigue, Melena    History is obtained from the patient    History of Present Illness   Los Renee is a 70 year old male who has a PMH most remarkable for gastric bypass (2012) with subsequent upper GI bleed related to anastomic ulceration (2020) who presents with fatigue on exertion and  anemia.     Patient notes that he shoveled snow yesterday and was extremely fatigued afterwards. He notes he had similar symptoms in September when he had a GI bleed at that time, but they weren't as severe as previous. In September he was admitted with acute anemia with a Hb in the 6-range. He noted this AM he had a large tarry and black stool, which prompted him to seek further medical attention. No other symptoms--denies fevers, chills, chest pain, SOB, abdominal pain, nausea, vomiting. No constipation.    Review of Systems    The 10 point Review of Systems is negative other than noted in the HPI or here.    Past Medical History    I have reviewed this patient's medical history and updated it with pertinent information if needed.   Past Medical History:   Diagnosis Date     Arthritis      Gastric bypass status for obesity      Gastro-oesophageal reflux disease      GI bleed 11/1/2012     GI bleed      Intermittent asthma     related to allergies- rare symptoms     Kidney stone     bilateral     Mixed hyperlipidemia 1/7/2008     Morbid obesity (H) 01/24/2008    s/p gastric bypass surgery     Morbid obesity (H) 1/24/2008     Mumps     in the early 60's     Nephrolithiasis      SOLITARIO (obstructive sleep apnea) 2010          Other chronic pain     chronic left knee pain     Retinopathy      Spider veins      Swelling of testicle      Type 2 diabetes mellitus (H) 01/07/2008     Unspecified essential hypertension 1/7/2008    resolved since gastric surgery      Past Surgical History   I have reviewed this patient's surgical history and updated it with pertinent information if needed.  Past Surgical History:   Procedure Laterality Date     COLONOSCOPY N/A 7/5/2018    Procedure: COMBINED COLONOSCOPY, SINGLE OR MULTIPLE BIOPSY/POLYPECTOMY BY BIOPSY;  colonoscopy;  Surgeon: Max Santizo MD;  Location:  GI     COMBINED CYSTOSCOPY, RETROGRADES, URETEROSCOPY, LASER HOLMIUM LITHOTRIPSY URETER(S), INSERT STENT Right  11/16/2020    Procedure: CYSTOSCOPY WITH RIGHT RETROGRADE PYELOGRAM, RIGHT URETEROSCOPY WITH LASER LITHOTRIPSY AND BASKET REMOVAL OF STONE, RIGHT URETERAL STENT PLACEMENT. LEFT STENT REMOVAL.;  Surgeon: Asher Le MD;  Location:  OR     CYSTOSCOPY       CYSTOSCOPY, RETROGRADES, INSERT STENT URETER(S), COMBINED Left 10/21/2020    Procedure: CYSTOSCOPY, LEFT RETROGRADE PYELOGRAM, LEFT URETEROSCOPY,LEFT URETERAL STENT PLACEMENT;  Surgeon: Asher Le MD;  Location:  OR     ESOPHAGOSCOPY, GASTROSCOPY, DUODENOSCOPY (EGD), COMBINED N/A 9/30/2020    Procedure: ESOPHAGOGASTRODUODENOSCOPY, WITH BIOPSY;  Surgeon: Neda Wheeler MD;  Location:  GI     EXTRACORPOREAL SHOCK WAVE LITHOTRIPSY, CYSTOSCOPY, INSERT STENT URETER(S), COMBINED Right 6/1/2015    Procedure: COMBINED EXTRACORPOREAL SHOCK WAVE LITHOTRIPSY, CYSTOSCOPY, INSERT STENT URETER(S);  Surgeon: Jeovanny Ricketts MD;  Location:  OR     IR RENAL STONE REMOVAL LEFT  10/21/2020     KNEE SURGERY       LAPAROSCOPIC BYPASS GASTRIC       LASER HOLMIUM LITHOTRIPSY URETER(S), INSERT STENT, COMBINED Right 11/3/2016    Procedure: COMBINED CYSTOSCOPY, URETEROSCOPY, LASER HOLMIUM LITHOTRIPSY URETER(S), INSERT STENT;  Surgeon: Neli Freitas MD;  Location:  OR     ORTHOPEDIC SURGERY      bilat knee replaCEMENTS     PERCUTANEOUS NEPHROLITHOTOMY Left 10/21/2020    Procedure: LEFT PERCUTANEOUS NEPHROLITHOTOMY,;  Surgeon: Asher Le MD;  Location:  OR     VASECTOMY        Social History   I have reviewed this patient's social history and updated it with pertinent information if needed. Los Renee  reports that he has never smoked. He has never used smokeless tobacco. He reports that he does not drink alcohol or use drugs.    Family History   I have reviewed this patient's family history and updated it with pertinent information if needed.  Family History   Problem Relation Age of Onset     C.A.D. Mother      Diabetes Mother       Breast Cancer Mother      Cerebrovascular Disease Mother      Alcohol/Drug Mother      Eye Disorder Mother      Heart Disease Mother      C.A.D. Father      Diabetes Father      Alcohol/Drug Father      Leukemia Father        Prior to Admission Medications   Prior to Admission Medications   Prescriptions Last Dose Informant Patient Reported? Taking?   Cholecalciferol (VITAMIN D) 2000 units tablet 2020 at Unknown time Self No Yes   Sig: Take 2,000 Units by mouth daily   Cyanocobalamin (B-12 SUPER STRENGTH) 5000 MCG/ML LIQD 2020 at Unknown time Self No Yes   Sig: Place 0.5 mLs under the tongue every other day FOR VITAMIN B12 SUPPLEMENTATION, PLEASE PLACE UNDER THE TONGUE   Iron-Vitamin C (VITRON-C PO) 2020 at Unknown time Self Yes Yes   Sig: Take 1 tablet by mouth daily   Lactobacillus (PROBIOTIC ACIDOPHILUS) CAPS 2020 at Unknown time Self Yes Yes   Sig: Take 1 capsule by mouth daily    blood glucose (ACCU-CHEK UCHE PLUS) test strip  at - Self No No   Si strip by In Vitro route daily   blood glucose monitoring (ACCU-CHEK UCHE PLUS) meter device kit  at - Self No No   Sig: TEST BLOOD SUGAR ONE TIME DAILY  OR AS DIRECTED   calcium-vitamin D (CALCIUM 600 + D) 600-400 MG-UNIT per tablet 2020 at Unknown time Self No Yes   Sig: Take 1 tablet by mouth daily   glimepiride (AMARYL) 1 MG tablet 2020 at Unknown time Self No Yes   Sig: Take 4 tablets (4 mg) by mouth every morning (before breakfast)   multivitamin w/minerals (THERA-VIT-M) tablet 2020 at Unknown time Self Yes Yes   Sig: Take 1 tablet by mouth daily   pantoprazole (PROTONIX) 40 MG EC tablet  Self No No   Sig: Take 1 tablet (40 mg) by mouth every morning (before breakfast)   potassium citrate (UROCIT-K) 10 MEQ (1080 MG) CR tablet 2020 at Unknown time Self No Yes   Sig: TAKE 1 TABLET BY MOUTH TWICE A DAY   simvastatin (ZOCOR) 20 MG tablet 2020 at Unknown time Self No Yes   Sig: Take 1 tablet (20 mg) by mouth  At Bedtime   tamsulosin (FLOMAX) 0.4 MG capsule 12/24/2020 at Unknown time Self No Yes   Sig: Take 1 capsule (0.4 mg) by mouth daily      Facility-Administered Medications: None     Allergies   Allergies   Allergen Reactions     Blood Transfusion Related (Informational Only) Other (See Comments)     Patient has a history of a clinically significant antibody against RBC antigens.  A delay in compatible RBCs may occur.     Simvastatin Nausea       Physical Exam   Vital Signs: Temp: 95.4  F (35.2  C) Temp src: Oral BP: 120/60 Pulse: 83   Resp: 16 SpO2: 100 % O2 Device: None (Room air)    Weight: 190 lbs 0 oz    General Appearance: Well appearing, no distress  Eyes: HORTENSIA, EOMI  HEENT: NC, AT. MMM.   Respiratory: CTAB, normal work of breathing  Cardiovascular: Regular Rate and Rhythm, normal S1, S2. No murmurs, rubs, gallops  GI: Soft, non-tender, non-distended  Lymph/Hematologic: No asymetric swelling, edema. No bruising.  Genitourinary: Deferred  Skin: No rashes, lesions, wounds.  Musculoskeletal: Warm, well perfused  Neurologic: AOx4, CNII-XII intact.  Psychiatric: Euthymic. Mood appropriate.     Data   Data reviewed today: I reviewed all medications, new labs and imaging results over the last 24 hours. I personally reviewed the patients previous endoscopy image(s) showing ulceration at gastric bypass anastomosis site.    Recent Labs   Lab 12/25/20  1018   WBC 4.9   HGB 9.8*   MCV 88      INR 1.01      POTASSIUM 4.0   CHLORIDE 105   CO2 28   BUN 31*   CR 1.35*   ANIONGAP 3   CIARRA 8.6   *   ALBUMIN 3.8   PROTTOTAL 7.6   BILITOTAL 0.3   ALKPHOS 100   ALT 34   AST 22   TROPI <0.015

## 2020-12-25 NOTE — PROGRESS NOTES
Update: Discussed with patient code status and he stated he would like to be DNR/DNI; I explained to him he would have to reverse this if there was a potential procedure which he was OK with.

## 2020-12-25 NOTE — ED PROVIDER NOTES
Emergency Department Attending Supervision Note  12/25/2020  11:36 AM      I evaluated this patient in conjunction with Wandy Ngo PA-C.    Briefly, Kaz Meyer is a 70-year-old male with history of gastric bypass surgery, GI bleed at the anastomosis site, kidney stone, hypertension, type 2 diabetes who presented with melena and fatigue.  Symptoms feel similar to how he felt 3 months ago when he had a GI bleed related to an anastomosis ulceration.  He denies chest pain, syncope, dizziness, or significant shortness of breath.      On my exam, he has had stable blood pressures, though heart rates in the upper 90s.  He is well-appearing, pleasant and conversant.  On cardiac exam, heart is regular rate, normal rhythm, no murmurs rubs or gallops.  Lungs are clear to auscultation bilaterally.  Abdomen is soft and nontender.  Skin is warm and dry, without pallor.    Results:  Labs Ordered and Resulted from Time of ED Arrival Up to the Time of Departure from the ED   CBC WITH PLATELETS DIFFERENTIAL - Abnormal; Notable for the following components:       Result Value    RBC Count 3.46 (*)     Hemoglobin 9.8 (*)     Hematocrit 30.6 (*)     All other components within normal limits   COMPREHENSIVE METABOLIC PANEL - Abnormal; Notable for the following components:    Glucose 228 (*)     Urea Nitrogen 31 (*)     Creatinine 1.35 (*)     GFR Estimate 53 (*)     All other components within normal limits   OCCULT BLOOD STOOL - Abnormal; Notable for the following components:    Occult Blood Positive (*)     All other components within normal limits   TROPONIN I   SARS-COV-2 (COVID-19) VIRUS RT-PCR   ABO/RH TYPE AND SCREEN   BLOOD COMPONENT   BLOOD COMPONENT     ED course:  The patient remained hemodynamically stable in the emergency department.  He was accepted for admission to the hospital under the care of Dr. Dwain Vo.  My impression is this is a 70-year-old male with history of type 2 diabetes, GI bleed at the anastomosis  site of gastric bypass, who presents with melena and fatigue.  He is Hemoccult positive, and has a slight dip in his hemoglobin to 9.8 from 12.2.  At this point, there is no evidence for rapid bleed requiring immediate intervention.  Plan is for admission to the hospital for evaluation by gastroenterology, to follow hemoglobin, and have gastroenterology evaluate for repeat EGD.    Diagnosis    ICD-10-CM    1. Upper GI bleed  K92.2 Asymptomatic SARS-CoV-2 COVID-19 Virus (Coronavirus) by PCR   2. History of gastric bypass  Z98.84          Cyrstal Islas MD, Tracy Lynn, MD  12/25/20 8299

## 2020-12-26 VITALS
OXYGEN SATURATION: 94 % | HEIGHT: 68 IN | WEIGHT: 190 LBS | RESPIRATION RATE: 16 BRPM | TEMPERATURE: 97.3 F | SYSTOLIC BLOOD PRESSURE: 129 MMHG | BODY MASS INDEX: 28.79 KG/M2 | HEART RATE: 74 BPM | DIASTOLIC BLOOD PRESSURE: 72 MMHG

## 2020-12-26 LAB
ALBUMIN SERPL-MCNC: 3.1 G/DL (ref 3.4–5)
ALP SERPL-CCNC: 85 U/L (ref 40–150)
ALT SERPL W P-5'-P-CCNC: 31 U/L (ref 0–70)
ANION GAP SERPL CALCULATED.3IONS-SCNC: 3 MMOL/L (ref 3–14)
AST SERPL W P-5'-P-CCNC: 22 U/L (ref 0–45)
BILIRUB SERPL-MCNC: 0.3 MG/DL (ref 0.2–1.3)
BUN SERPL-MCNC: 17 MG/DL (ref 7–30)
CALCIUM SERPL-MCNC: 8.6 MG/DL (ref 8.5–10.1)
CHLORIDE SERPL-SCNC: 110 MMOL/L (ref 94–109)
CO2 SERPL-SCNC: 27 MMOL/L (ref 20–32)
CREAT SERPL-MCNC: 1.25 MG/DL (ref 0.66–1.25)
ERYTHROCYTE [DISTWIDTH] IN BLOOD BY AUTOMATED COUNT: 13.6 % (ref 10–15)
GFR SERPL CREATININE-BSD FRML MDRD: 59 ML/MIN/{1.73_M2}
GLUCOSE BLDC GLUCOMTR-MCNC: 108 MG/DL (ref 70–99)
GLUCOSE BLDC GLUCOMTR-MCNC: 131 MG/DL (ref 70–99)
GLUCOSE BLDC GLUCOMTR-MCNC: 159 MG/DL (ref 70–99)
GLUCOSE SERPL-MCNC: 167 MG/DL (ref 70–99)
HCT VFR BLD AUTO: 29.1 % (ref 40–53)
HGB BLD-MCNC: 9.1 G/DL (ref 13.3–17.7)
HGB BLD-MCNC: 9.3 G/DL (ref 13.3–17.7)
MCH RBC QN AUTO: 28.1 PG (ref 26.5–33)
MCHC RBC AUTO-ENTMCNC: 32 G/DL (ref 31.5–36.5)
MCV RBC AUTO: 88 FL (ref 78–100)
PLATELET # BLD AUTO: 170 10E9/L (ref 150–450)
POTASSIUM SERPL-SCNC: 4 MMOL/L (ref 3.4–5.3)
PROT SERPL-MCNC: 6.4 G/DL (ref 6.8–8.8)
RBC # BLD AUTO: 3.31 10E12/L (ref 4.4–5.9)
SODIUM SERPL-SCNC: 140 MMOL/L (ref 133–144)
UPPER GI ENDOSCOPY: NORMAL
WBC # BLD AUTO: 4.2 10E9/L (ref 4–11)

## 2020-12-26 PROCEDURE — G0500 MOD SEDAT ENDO SERVICE >5YRS: HCPCS | Performed by: INTERNAL MEDICINE

## 2020-12-26 PROCEDURE — 250N000011 HC RX IP 250 OP 636: Performed by: HOSPITALIST

## 2020-12-26 PROCEDURE — 85027 COMPLETE CBC AUTOMATED: CPT | Performed by: HOSPITALIST

## 2020-12-26 PROCEDURE — G0378 HOSPITAL OBSERVATION PER HR: HCPCS

## 2020-12-26 PROCEDURE — 80053 COMPREHEN METABOLIC PANEL: CPT | Performed by: HOSPITALIST

## 2020-12-26 PROCEDURE — 999N001017 HC STATISTIC GLUCOSE BY METER IP

## 2020-12-26 PROCEDURE — C9113 INJ PANTOPRAZOLE SODIUM, VIA: HCPCS | Performed by: HOSPITALIST

## 2020-12-26 PROCEDURE — 36415 COLL VENOUS BLD VENIPUNCTURE: CPT | Performed by: INTERNAL MEDICINE

## 2020-12-26 PROCEDURE — 250N000009 HC RX 250: Performed by: INTERNAL MEDICINE

## 2020-12-26 PROCEDURE — 43236 UPPR GI SCOPE W/SUBMUC INJ: CPT | Mod: XU | Performed by: INTERNAL MEDICINE

## 2020-12-26 PROCEDURE — 250N000011 HC RX IP 250 OP 636: Performed by: INTERNAL MEDICINE

## 2020-12-26 PROCEDURE — 85018 HEMOGLOBIN: CPT | Mod: 91 | Performed by: INTERNAL MEDICINE

## 2020-12-26 PROCEDURE — 36415 COLL VENOUS BLD VENIPUNCTURE: CPT | Performed by: HOSPITALIST

## 2020-12-26 PROCEDURE — 99207 PR CDG-CODE CATEGORY CHANGED: CPT | Performed by: INTERNAL MEDICINE

## 2020-12-26 PROCEDURE — 99217 PR OBSERVATION CARE DISCHARGE: CPT | Performed by: INTERNAL MEDICINE

## 2020-12-26 PROCEDURE — 43255 EGD CONTROL BLEEDING ANY: CPT | Performed by: INTERNAL MEDICINE

## 2020-12-26 PROCEDURE — 250N000013 HC RX MED GY IP 250 OP 250 PS 637: Performed by: HOSPITALIST

## 2020-12-26 RX ORDER — NALOXONE HYDROCHLORIDE 0.4 MG/ML
0.2 INJECTION, SOLUTION INTRAMUSCULAR; INTRAVENOUS; SUBCUTANEOUS
Status: DISCONTINUED | OUTPATIENT
Start: 2020-12-26 | End: 2020-12-26 | Stop reason: HOSPADM

## 2020-12-26 RX ORDER — FLUMAZENIL 0.1 MG/ML
0.2 INJECTION, SOLUTION INTRAVENOUS
Status: DISCONTINUED | OUTPATIENT
Start: 2020-12-26 | End: 2020-12-26 | Stop reason: HOSPADM

## 2020-12-26 RX ORDER — SUCRALFATE 1 G/1
1 TABLET ORAL
Status: DISCONTINUED | OUTPATIENT
Start: 2020-12-26 | End: 2020-12-26 | Stop reason: HOSPADM

## 2020-12-26 RX ORDER — PANTOPRAZOLE SODIUM 40 MG/1
40 TABLET, DELAYED RELEASE ORAL
Status: DISCONTINUED | OUTPATIENT
Start: 2020-12-27 | End: 2020-12-26 | Stop reason: HOSPADM

## 2020-12-26 RX ORDER — NALOXONE HYDROCHLORIDE 0.4 MG/ML
0.4 INJECTION, SOLUTION INTRAMUSCULAR; INTRAVENOUS; SUBCUTANEOUS
Status: DISCONTINUED | OUTPATIENT
Start: 2020-12-26 | End: 2020-12-26 | Stop reason: HOSPADM

## 2020-12-26 RX ORDER — PANTOPRAZOLE SODIUM 40 MG/1
40 TABLET, DELAYED RELEASE ORAL
Qty: 30 TABLET | Refills: 3 | Status: SHIPPED | OUTPATIENT
Start: 2020-12-26

## 2020-12-26 RX ORDER — FENTANYL CITRATE 50 UG/ML
INJECTION, SOLUTION INTRAMUSCULAR; INTRAVENOUS PRN
Status: DISCONTINUED | OUTPATIENT
Start: 2020-12-26 | End: 2020-12-26 | Stop reason: HOSPADM

## 2020-12-26 RX ORDER — SUCRALFATE 1 G/1
1 TABLET ORAL
Qty: 120 TABLET | Refills: 1 | Status: SHIPPED | OUTPATIENT
Start: 2020-12-26

## 2020-12-26 RX ORDER — EPINEPHRINE IN SOD CHLOR,ISO 1 MG/10 ML
SYRINGE (ML) INTRAVENOUS PRN
Status: DISCONTINUED | OUTPATIENT
Start: 2020-12-26 | End: 2020-12-26 | Stop reason: HOSPADM

## 2020-12-26 RX ADMIN — PANTOPRAZOLE SODIUM 40 MG: 40 INJECTION, POWDER, FOR SOLUTION INTRAVENOUS at 10:46

## 2020-12-26 RX ADMIN — TAMSULOSIN HYDROCHLORIDE 0.4 MG: 0.4 CAPSULE ORAL at 10:46

## 2020-12-26 ASSESSMENT — ACTIVITIES OF DAILY LIVING (ADL)
ADLS_ACUITY_SCORE: 11
CONCENTRATING,_REMEMBERING_OR_MAKING_DECISIONS_DIFFICULTY: NO
WALKING_OR_CLIMBING_STAIRS_DIFFICULTY: NO
DOING_ERRANDS_INDEPENDENTLY_DIFFICULTY: NO
ADLS_ACUITY_SCORE: 11
DIFFICULTY_COMMUNICATING: NO
DRESSING/BATHING_DIFFICULTY: NO
ADLS_ACUITY_SCORE: 11
TOILETING_ISSUES: NO
WEAR_GLASSES_OR_BLIND: NO
ADLS_ACUITY_SCORE: 11
DIFFICULTY_EATING/SWALLOWING: NO
FALL_HISTORY_WITHIN_LAST_SIX_MONTHS: NO

## 2020-12-26 NOTE — PROGRESS NOTES
[facility]  Gastroenterology Progress Note     Los Renee MRN# [id number]   YOB: 1950 Age: 70 year old          Assessment and Plan:     Upper GI bleed    History of gastric bypass  Patient is overall doing better.  Hemoglobin is stable patient had 1 more bowel of melanotic stools.  Upper GI endoscopy showed large anastomotic ulcer with visible vessel.  Ulcer was cauterized hemostasis was secured patient did very well.  I will recommend the patient to continue on oral Protonix and sucralfate.  Repeat hemoglobin later today start on soft diet if patient hemoglobin remained stable and patient is able to tolerate p.o. patient can be discharged from GI standpoint with follow-up in GI clinic in 1 to 2 weeks.  I will recommend repeat upper GI endoscopy to check healing in about 2 months.            Upper GI bleed  History of gastric bypass      Interval History:   doing well; no cp, sob, n/v/d, or abd pain.              Review of Systems:   C: NEGATIVE for fever, chills, change in weight  E/M: NEGATIVE for ear, mouth and throat problems  R: NEGATIVE for significant cough or SOB  CV: NEGATIVE for chest pain, palpitations or peripheral edema             Medications:   I have reviewed this patient's current medications    insulin aspart  1-7 Units Subcutaneous TID AC     insulin aspart  1-5 Units Subcutaneous At Bedtime     pantoprazole (PROTONIX) IV  40 mg Intravenous Q12H     simvastatin  20 mg Oral At Bedtime     sodium chloride (PF)  3 mL Intracatheter Q8H     tamsulosin  0.4 mg Oral Daily                  Physical Exam:   Vitals were reviewed  Vital Signs with Ranges  Temp:  [95.4  F (35.2  C)-98  F (36.7  C)] 97.3  F (36.3  C)  Pulse:  [68-97] 74  Resp:  [11-21] 16  BP: (104-152)/(46-85) 129/72  SpO2:  [94 %-100 %] 94 %  I/O last 3 completed shifts:  In: 2046 [P.O.:600; I.V.:446; IV Piggyback:1000]  Out: -   Constitutional: healthy, alert and no distress   Cardiovascular:  negative, PMI normal. No lifts, heaves, or thrills. RRR. No murmurs, clicks gallops or rub  Respiratory: negative, Percussion normal. Good diaphragmatic excursion. Lungs clear  Head: Normocephalic. No masses, lesions, tenderness or abnormalities  Neck: Neck supple. No adenopathy. Thyroid symmetric, normal size,, Carotids without bruits.  Abdomen: Abdomen soft, non-tender. BS normal. No masses, organomegaly  SKIN: no suspicious lesions or rashes           Data:   I reviewed the patient's new clinical lab test results.   Recent Labs   Lab Test 12/26/20  0812 12/25/20 2018 12/25/20 1018 09/29/20 2017 09/29/20 2017 09/29/20  1622   WBC 4.2 4.3 4.9   < >  --  5.6   HGB 9.3* 8.6* 9.8*   < >  --  6.8*   MCV 88 88 88   < >  --  90    166 201   < >  --  234   INR  --   --  1.01  --  1.20* 1.20*    < > = values in this interval not displayed.     Recent Labs   Lab Test 12/26/20  0812 12/25/20  1018 11/16/20  1222   POTASSIUM 4.0 4.0 4.4   CHLORIDE 110* 105 108   CO2 27 28 29   BUN 17 31* 26   ANIONGAP 3 3 2*     Recent Labs   Lab Test 12/26/20  0812 12/25/20  1018 09/29/20  1622 10/11/16  1441 10/11/16  1441 09/27/16  0813   ALBUMIN 3.1* 3.8 3.1*   < >  --   --    BILITOTAL 0.3 0.3 0.3   < >  --   --    ALT 31 34 24   < >  --   --    AST 22 22 12   < >  --   --    PROTEIN  --   --   --   --  30* 100*    < > = values in this interval not displayed.       I reviewed the patient's new imaging results.    All laboratory data reviewed  All imaging studies reviewed by me.    Max Santizo MD,  12/26/2020  [facility]  Office : [phone]  Cell: [phone]

## 2020-12-26 NOTE — PLAN OF CARE
Pt came up from ED around 1400. A/O x4. Soft BP, other VSS. Tele: SR with PACs. Denies pain. No BM this shift yet. BS active, passing flatus. Hgb 9.8. Voiding adequately in BR, up independently. Tolerating Clear liquid diet, denies nausea. NPO at 1630 per GI orders. Plan for EGD tomorrow AM. PIV infusing NS at 100 ml/hr. BG checks, sliding scale insulin given.

## 2020-12-26 NOTE — CONSULTS
[facility]  Gastroenterology Consultation         Los Renee  [address]  [facility] [address]  70 year old male    Admission Date/Time: 12/25/2020  Primary Care Provider: Art Cheung  Referring / Attending Physician:  Dr. Vo    We were asked to see the patient in consultation by Dr. Vo for evaluation of GI bleed melena.      CC: GI bleed was    HPI:  Los Renee is a 70 year old male who recently hospitalized with history of GI bleed patient was diagnosed with an ulcer patient is well-known to GI service.  Patient had history of weakness lack of energy patient also developed episode of black tarry stool this morning.  Patient's hemoglobin dropped., fatigue.  Patient is status post gastric bypass.  Patient has a past history include type 2 diabetes hypertension BPH.  Patient's Covid test is negative.  Patient is clinically feeling well.  Patient is denying any abdominal pain fever chills chest pain.  Rest of review of system is negative.  Awake alert oriented    ROS: A comprehensive ten point review of systems was negative aside from those in mentioned in the HPI.      PAST MED HX:  I have reviewed this patient's medical history and updated it with pertinent information if needed.   Past Medical History:   Diagnosis Date     Arthritis      Gastric bypass status for obesity      Gastro-oesophageal reflux disease      GI bleed 11/1/2012     GI bleed      Intermittent asthma     related to allergies- rare symptoms     Kidney stone     bilateral     Mixed hyperlipidemia 1/7/2008     Morbid obesity (H) 01/24/2008    s/p gastric bypass surgery     Morbid obesity (H) 1/24/2008     Mumps     in the early 60's     Nephrolithiasis      SOLITARIO (obstructive sleep apnea) 2010          Other chronic pain     chronic left knee pain     Retinopathy      Spider veins      Swelling of testicle      Type 2 diabetes mellitus (H) 01/07/2008     Unspecified essential hypertension  2008    resolved since gastric surgery       MEDICATIONS:   Prior to Admission Medications   Prescriptions Last Dose Informant Patient Reported? Taking?   Cholecalciferol (VITAMIN D) 2000 units tablet 2020 at Unknown time Self No Yes   Sig: Take 2,000 Units by mouth daily   Cyanocobalamin (B-12 SUPER STRENGTH) 5000 MCG/ML LIQD 2020 at Unknown time Self No Yes   Sig: Place 0.5 mLs under the tongue every other day FOR VITAMIN B12 SUPPLEMENTATION, PLEASE PLACE UNDER THE TONGUE   Iron-Vitamin C (VITRON-C PO) 2020 at Unknown time Self Yes Yes   Sig: Take 1 tablet by mouth daily   Lactobacillus (PROBIOTIC ACIDOPHILUS) CAPS 2020 at Unknown time Self Yes Yes   Sig: Take 1 capsule by mouth daily    blood glucose (ACCU-CHEK UCHE PLUS) test strip  at - Self No No   Si strip by In Vitro route daily   blood glucose monitoring (ACCU-CHEK UCHE PLUS) meter device kit  at - Self No No   Sig: TEST BLOOD SUGAR ONE TIME DAILY  OR AS DIRECTED   calcium-vitamin D (CALCIUM 600 + D) 600-400 MG-UNIT per tablet 2020 at Unknown time Self No Yes   Sig: Take 1 tablet by mouth daily   glimepiride (AMARYL) 1 MG tablet 2020 at Unknown time Self No Yes   Sig: Take 4 tablets (4 mg) by mouth every morning (before breakfast)   multivitamin w/minerals (THERA-VIT-M) tablet 2020 at Unknown time Self Yes Yes   Sig: Take 1 tablet by mouth daily   pantoprazole (PROTONIX) 40 MG EC tablet  Self No No   Sig: Take 1 tablet (40 mg) by mouth every morning (before breakfast)   potassium citrate (UROCIT-K) 10 MEQ (1080 MG) CR tablet 2020 at Unknown time Self No Yes   Sig: TAKE 1 TABLET BY MOUTH TWICE A DAY   simvastatin (ZOCOR) 20 MG tablet 2020 at Unknown time Self No Yes   Sig: Take 1 tablet (20 mg) by mouth At Bedtime   tamsulosin (FLOMAX) 0.4 MG capsule 2020 at Unknown time Self No Yes   Sig: Take 1 capsule (0.4 mg) by mouth daily      Facility-Administered Medications: None       ALLERGIES:    Allergies   Allergen Reactions     Blood Transfusion Related (Informational Only) Other (See Comments)     Patient has a history of a clinically significant antibody against RBC antigens.  A delay in compatible RBCs may occur.     Simvastatin Nausea       SOCIAL HISTORY:  Social History     Tobacco Use     Smoking status: Never Smoker     Smokeless tobacco: Never Used   Substance Use Topics     Alcohol use: No     Alcohol/week: 0.0 standard drinks     Drug use: No       FAMILY HISTORY:  Family History   Problem Relation Age of Onset     C.A.D. Mother      Diabetes Mother      Breast Cancer Mother      Cerebrovascular Disease Mother      Alcohol/Drug Mother      Eye Disorder Mother      Heart Disease Mother      C.A.D. Father      Diabetes Father      Alcohol/Drug Father      Leukemia Father        PHYSICAL EXAM:   General awake alert oriented  Vital Signs with Ranges  Temp: 97.9  F (36.6  C) Temp src: Oral BP: 108/46 Pulse: 78   Resp: 16 SpO2: 97 % O2 Device: None (Room air)    I/O last 3 completed shifts:  In: 1000 [IV Piggyback:1000]  Out: -     Constitutional: healthy, alert and no distress   Cardiovascular: negative, PMI normal. No lifts, heaves, or thrills. RRR. No murmurs, clicks gallops or rub  Respiratory: negative, Percussion normal. Good diaphragmatic excursion. Lungs clear  Neck: Neck supple. No adenopathy. Thyroid symmetric, normal size,, Carotids without bruits.  Abdomen: Abdomen soft, non-tender. BS normal. No masses, organomegaly  SKIN: no suspicious lesions or rashes          ADDITIONAL COMMENTS:   I reviewed the patient's new clinical lab test results.   Recent Labs   Lab Test 12/25/20 2018 12/25/20  1018 11/16/20  1222 09/29/20 2017 09/29/20 2017 09/29/20  1622   WBC 4.3 4.9 5.0   < >  --  5.6   HGB 8.6* 9.8* 12.0*   < >  --  6.8*   MCV 88 88 90   < >  --  90    201 196   < >  --  234   INR  --  1.01  --   --  1.20* 1.20*    < > = values in this interval not displayed.     Recent Labs    Lab Test 12/25/20  1018 11/16/20  1222 10/27/20  0750   POTASSIUM 4.0 4.4 4.6   CHLORIDE 105 108 109   CO2 28 29 25   BUN 31* 26 26   ANIONGAP 3 2* 4     Recent Labs   Lab Test 12/25/20  1018 09/29/20  1622 05/21/18  0753 08/12/17  0918 10/11/16  1441 10/11/16  1441 09/27/16  0813   ALBUMIN 3.8 3.1*  --  3.8   < >  --   --    BILITOTAL 0.3 0.3  --  0.6   < >  --   --    ALT 34 24 38 55   < >  --   --    AST 22 12  --  33   < >  --   --    PROTEIN  --   --   --   --   --  30* 100*    < > = values in this interval not displayed.       I reviewed the patient's new imaging results.        CONSULTATION ASSESSMENT AND PLAN:    Active Problems:    Upper GI bleed    Assessment: Very pleasant 70-year-old gentleman with history of GI bleed patient is status post gastric bypass who presented again with episode of black tarry stool along with drop in hemoglobin weakness.  Patient findings are quite consistent with acute recurrent GI bleed most likely from the anastomotic ulcer from previous gastric bypass.  Other differential diagnosis can include angio venous malformation less likely neoplastic process.  His last colonoscopy was 2 years ago which was fairly unremarkable except for one small polyp.  Risk-benefit plan and differential diagnosis discussed in detail with patient.  Plan to proceed with upper GI endoscopy tomorrow continue on IV Protonix serial hemoglobins.                Max Santizo MD, FACP  [facility].  Office: [phone]  Cell : [phone]

## 2020-12-26 NOTE — PLAN OF CARE
A&O. VSS, softer Bp's. Denies any pain. Had x1 large watery BM that was dark brown in color- pt stated was much more black at home. Continues on PPI, monitoring hgb. Tele NSR. PIV SL. NPO for EGD today, on schedule at 0930.

## 2020-12-26 NOTE — PLAN OF CARE
Patient up IND in room. Tolerating diet, feels ready for discharge. Discharge AVS reviewed with patient, all questions answered.

## 2020-12-26 NOTE — PROGRESS NOTES
Assumed care for the day.  Mr. Renee feels well.  He is currently NPO awaiting a scheduled EGD.  Hgb this AM is pending.  Exam stable.  If does well with the procedure with no serious findings noted he possibly can discharge later today.

## 2020-12-26 NOTE — PLAN OF CARE
A&Ox4.  VSS.  Room air.  Denies pain.  Tele: SD.  Up independently in room.  EGD done today.  Trending hgb: 9.8, 8.6, 9.3.  Advanced to [facility] dental soft diet, tolerating well, good appetite.  Denies N/V.  No BMs this shift, voiding in bathroom.  Possible discharge home if continues to tolerate oral intake and hgb stable, last lab hgb vesta @ 1400-awaiting.  Patient would like to discharge home today.

## 2020-12-26 NOTE — PROVIDER NOTIFICATION
MD Notification    Notified Person: MD    Notified Person Name: Max Santizo - GI    Notification Date/Time: 1132 12/26/2020    Notification Interaction: Called direct cell provided in GI note    Purpose of Notification: diet order needed, EGD completed.    Orders Received: verbalized that diet will be ordered    Comments:

## 2020-12-26 NOTE — UTILIZATION REVIEW
"Admission Status; Secondary Review Determination     Under the authority of the Utilization Management Committee, the utilization review process indicated a secondary review on the above patient.  The review outcome is based on review of the medical records, discussions with staff, and applying clinical experience noted on the date of the review.          (x) Observation Status Appropriate - This patient does not meet hospital inpatient criteria and is placed in observation status. If this patient's primary payer is Medicare and was admitted as an inpatient, Condition Code 44 should be used and patient status changed to \"observation\".     RATIONALE FOR DETERMINATION   69 yo man with h/o Latanya-en-Y gastric bypass with anastomotic ulcer presented with melena. Was supposed to be taking PPI but stopped. Hemoglobin 9.8 with baseline 12.0. Endoscopy performed and again showed Latanya-en-Y gastrojejunostomy with gastrojejunal anastomosis characterized by ulceration. This was injected and treated with bipolar cautery. Discharging home with soft diet and twice daily PPI and outpatient follow up.     The severity of illness, intensity of service provided, expected LOS and risk for adverse outcome make the care appropriate for further observation; however, doesn't meet criteria for hospital inpatient admission. Dr. Calixto notified of this determination.    This document was produced using voice recognition software.      The information on this document is developed by the utilization review team in order for the business office to ensure compliance.  This only denotes the appropriateness of proper admission status and does not reflect the quality of care rendered.         The definitions of Inpatient Status and Observation Status used in making the determination above are those provided in the CMS Coverage Manual, Chapter 1 and Chapter 6, section 70.4.      Sincerely,  Alba Domingo MD  Utilization Review  Physician " Advisor  [facility].

## 2020-12-27 LAB
BLD PROD TYP BPU: NORMAL
BLD PROD TYP BPU: NORMAL
BLD UNIT ID BPU: 0
BLD UNIT ID BPU: 0
BLOOD PRODUCT CODE: NORMAL
BLOOD PRODUCT CODE: NORMAL
BPU ID: NORMAL
BPU ID: NORMAL
TRANSFUSION STATUS PATIENT QL: NORMAL

## 2020-12-28 ENCOUNTER — TELEPHONE (OUTPATIENT)
Dept: INTERNAL MEDICINE | Facility: CLINIC | Age: 70
End: 2020-12-28

## 2020-12-29 NOTE — TELEPHONE ENCOUNTER
ED / Discharge Outreach Protocol    Patient Contact    Attempt # 1    Was call answered?  No.  Left message on voicemail with information to call me back.    Rosanna MUÑIZN, RN, PHN

## 2020-12-29 NOTE — DISCHARGE SUMMARY
[facility]  Discharge Summary  Hospitalist    Date of Admission:  12/25/2020  Date of Discharge:  12/26/2020  4:28 PM  Provider:  Kaz Calixto DO, FHM    Discharge Diagnoses   1.  Acute GI bleed due to ulcer    Other medical issues:  Past Medical History:   Diagnosis Date     Arthritis      Gastric bypass status for obesity      Gastro-oesophageal reflux disease      GI bleed 11/1/2012     GI bleed      Intermittent asthma     related to allergies- rare symptoms     Kidney stone     bilateral     Mixed hyperlipidemia 1/7/2008     Morbid obesity (H) 01/24/2008    s/p gastric bypass surgery     Morbid obesity (H) 1/24/2008     Mumps     in the early 60's     Nephrolithiasis      SOLITARIO (obstructive sleep apnea) 2010          Other chronic pain     chronic left knee pain     Retinopathy      Spider veins      Swelling of testicle      Type 2 diabetes mellitus (H) 01/07/2008     Unspecified essential hypertension 1/7/2008    resolved since gastric surgery       History of Present Illness   Los Renee is an 70 year old male who presented with fatigue and was noted to have anemia.  Please see the admission history and physical for full details.    Hospital Course   Los Renee was admitted on 12/25/2020.  The following problems were addressed during his hospitalization:    Mr. Renee presented with increasing fatigue and was noted to have a marked anemia.  He underwent an EGD and was found to have an anastamotic ulcer that was injected/cauterized.  He did well and tolerated a diet advance.  He was recommended to take a daily PPI, carafate, avoid irritants like NSAIDS, and was recommended for follow-up EGD in a few weeks.  The patient felt well day of discharge and strongly desired discharge.  Mr. Renee was comfortable with the plan.  He was agreeable to seeking eval right away if any new signs of bleeding.       Significant Results and Procedures   EGD - See procedure report for full  "details    Pending Results     Unresulted Labs Ordered in the Past 30 Days of this Admission     No orders found from 11/25/2020 to 12/26/2020.          Code Status   Full Code       Primary Care Physician   Art Cheung    Blood pressure 129/72, pulse 74, temperature 97.3  F (36.3  C), temperature source Oral, resp. rate 16, height 1.727 m (5' 8\"), weight 86.2 kg (190 lb), SpO2 94 %.    Alert, oriented, appeared comfortable, heart regular, lungs clear, abd non-tender.    Discharge Disposition   Discharged to home    Consultations This Hospital Stay   GASTROENTEROLOGY IP CONSULT    Time Spent on this Encounter   IKaz DO, personally saw the patient today and spent greater than 30 minutes discharging this patient.    Discharge Orders      Reason for your hospital stay    Ulcer     Follow-up and recommended labs and tests     Follow-up with primary provider 4-7 days with a repeat hemoglobin check recommended.  Recommend follow-up with GI in 2 months for a repeat EGD (RN to please provide patient GI providers office contact information).     Activity    Your activity upon discharge: activity as tolerated     When to contact your care team    Call if questions.  Notify provider if fevers, bleeding, worsening nausea/vomiting, worsening abdominal pain, other new medical concerns.     Discharge Instructions    Avoid NSAIDS such as ibuprofen.  Do not take aspirin or other blood thinners without talking with your provider first.     Diet    Follow this diet upon discharge:      Diabetic Mechanical/Dental Soft Diet x 1 week then resume regular prior to admission diet as tolerated     Discharge Medications   Discharge Medication List as of 12/26/2020  4:00 PM      START taking these medications    Details   sucralfate (CARAFATE) 1 GM tablet Take 1 tablet (1 g) by mouth 4 times daily (before meals and nightly), Disp-120 tablet, R-1, E-Prescribe         CONTINUE these medications which have CHANGED    Details "   pantoprazole (PROTONIX) 40 MG EC tablet Take 1 tablet (40 mg) by mouth every morning (before breakfast), Disp-30 tablet, R-3, E-Prescribe         CONTINUE these medications which have NOT CHANGED    Details   blood glucose (ACCU-CHEK UCHE PLUS) test strip 1 strip by In Vitro route daily, Disp-100 strip, R-11, E-Prescribe      blood glucose monitoring (ACCU-CHEK UCHE PLUS) meter device kit TEST BLOOD SUGAR ONE TIME DAILY  OR AS DIRECTEDDisp-1 kit, [id number]      calcium-vitamin D (CALCIUM 600 + D) 600-400 MG-UNIT per tablet Take 1 tablet by mouth daily, Disp-100 tablet,R-PRN, E-Prescribe      Cholecalciferol (VITAMIN D) 2000 units tablet Take 2,000 Units by mouth daily, Disp-90 tablet,R-3, E-Prescribe      Cyanocobalamin (B-12 SUPER STRENGTH) 5000 MCG/ML LIQD Place 0.5 mLs under the tongue every other day FOR VITAMIN B12 SUPPLEMENTATION, PLEASE PLACE UNDER THE TONGUE, Disp-2 Bottle,R-PRN, No Print Out      glimepiride (AMARYL) 1 MG tablet Take 4 tablets (4 mg) by mouth every morning (before breakfast), Disp-360 tablet, R-0, E-Prescribe      Iron-Vitamin C (VITRON-C PO) Take 1 tablet by mouth daily, Historical      Lactobacillus (PROBIOTIC ACIDOPHILUS) CAPS Take 1 capsule by mouth daily , Historical      multivitamin w/minerals (THERA-VIT-M) tablet Take 1 tablet by mouth daily, Historical      potassium citrate (UROCIT-K) 10 MEQ (1080 MG) CR tablet TAKE 1 TABLET BY MOUTH TWICE A DAY, Disp-180 tablet,R-3, E-Prescribe      simvastatin (ZOCOR) 20 MG tablet Take 1 tablet (20 mg) by mouth At Bedtime, Disp-90 tablet,R-3, E-PrescribePROFILE FOR FUTURE REFILLS      tamsulosin (FLOMAX) 0.4 MG capsule Take 1 capsule (0.4 mg) by mouth daily, Disp-90 capsule,R-0, No Print Out             Allergies   Allergies   Allergen Reactions     Blood Transfusion Related (Informational Only) Other (See Comments)     Patient has a history of a clinically significant antibody against RBC antigens.  A delay in compatible RBCs may  occur.     Simvastatin Nausea     Data      Recent Labs   Lab 12/26/20  1415 12/26/20  0812 12/25/20 2018 12/25/20  1018   WBC  --  4.2 4.3 4.9   HGB 9.1* 9.3* 8.6* 9.8*   HCT  --  29.1* 25.8* 30.6*   MCV  --  88 88 88   PLT  --  170 166 201     Recent Labs   Lab 12/26/20  0812 12/25/20  1018    136   POTASSIUM 4.0 4.0   CHLORIDE 110* 105   CO2 27 28   ANIONGAP 3 3   * 228*   BUN 17 31*   CR 1.25 1.35*   GFRESTIMATED 59* 53*   GFRESTBLACK 68 62   CIARRA 8.6 8.6   PROTTOTAL 6.4* 7.6   ALBUMIN 3.1* 3.8   BILITOTAL 0.3 0.3   ALKPHOS 85 100   AST 22 22   ALT 31 34       EGD:    Findings:        The examined esophagus was normal.        Evidence of a Latanya-en-Y gastrojejunostomy was found. The gastrojejunal        anastomosis was characterized by ulceration. Ulcer was 30 mm with        pigmented material and vessel. This was traversed. The pouch-to-jejunum        limb was characterized by congestion. The jejunojejunal anastomosis was        characterized by healthy appearing mucosa. The duodenum-to-jejunum limb        was examined. Area was successfully injected with 1 mL of a 1:10,000        solution of epinephrine for hemostasis. Coagulation for hemostasis using        bipolar probe was successful.        The examined jejunum was normal.                                                                                     Impression:               - Normal esophagus.                             - Latanya-en-Y gastrojejunostomy with gastrojejunal                             anastomosis characterized by ulceration. Injected.                              Treated with bipolar cautery.                             - Normal examined jejunum.                             - No specimens collected.   Recommendation:           - Please continue to follow with Primary care                             Physician.                             - Use Protonix (pantoprazole) 40 mg PO daily.                             - Use  sucralfate tablets 1 gram PO QID.                             - Repeat upper endoscopy in 2 months to check                             healing.

## 2020-12-30 NOTE — TELEPHONE ENCOUNTER
ED / Discharge Outreach Protocol    Patient Contact    Attempt # 2    Was call answered?  No.  Left message on voicemail with information to call me back.      Rosanna MUÑIZN, RN, PHN

## 2021-01-14 ENCOUNTER — HEALTH MAINTENANCE LETTER (OUTPATIENT)
Age: 71
End: 2021-01-14

## 2021-02-16 DIAGNOSIS — E11.42 TYPE 2 DIABETES MELLITUS WITH DIABETIC POLYNEUROPATHY, WITHOUT LONG-TERM CURRENT USE OF INSULIN (H): ICD-10-CM

## 2021-02-16 RX ORDER — GLIMEPIRIDE 1 MG/1
4 TABLET ORAL
Qty: 360 TABLET | Refills: 0 | Status: SHIPPED | OUTPATIENT
Start: 2021-02-16

## 2021-02-16 NOTE — TELEPHONE ENCOUNTER
Patient called the clinic requesting a refill for glimepiride 1 mg. Advised he needs a 6 month diabetic check OV in April, 2021 one month before he runs out of medication. Pt verbalized agreement.     Prescription approved per [facility] Refill Protocol.

## 2021-02-22 DIAGNOSIS — N40.1 BENIGN PROSTATIC HYPERPLASIA WITH LOWER URINARY TRACT SYMPTOMS, SYMPTOM DETAILS UNSPECIFIED: ICD-10-CM

## 2021-02-22 RX ORDER — TAMSULOSIN HYDROCHLORIDE 0.4 MG/1
CAPSULE ORAL
Qty: 90 CAPSULE | Refills: 2 | Status: SHIPPED | OUTPATIENT
Start: 2021-02-22

## 2021-05-09 ENCOUNTER — HEALTH MAINTENANCE LETTER (OUTPATIENT)
Age: 71
End: 2021-05-09

## 2021-06-29 NOTE — PROGRESS NOTES
PTA medications updated by Medication Scribe prior to surgery via phone call with patient      -LAST DOSES ENTERED BY NURSE-    Comments:    Medication history sources: Patient, Surescripts, H&P, Care Everywhere and (Patient phoned in a med list 11/13/2020.)  Medication history source reliability: Good  Adherence assessment: N/A Not Observed    Significant changes made to the medication list:  None      Additional medication history information:   None        Prior to Admission medications    Medication Sig Last Dose Taking? Auth Provider   amoxicillin (AMOXIL) 500 MG capsule Take 2,000 mg by mouth daily as needed (dental appointment) (4 X 500 mg)  at PRN Yes Reported, Patient   calcium-vitamin D (CALCIUM 600 + D) 600-400 MG-UNIT per tablet Take 1 tablet by mouth daily  at AM Yes Art Cheung MD   Cholecalciferol (VITAMIN D) 2000 units tablet Take 2,000 Units by mouth daily  at AM Yes Art Cheung MD   Cyanocobalamin (B-12 SUPER STRENGTH) 5000 MCG/ML LIQD Place 0.5 mLs under the tongue every other day FOR VITAMIN B12 SUPPLEMENTATION, PLEASE PLACE UNDER THE TONGUE  at AM Yes Karley Irene MD   glimepiride (AMARYL) 1 MG tablet Take 4 tablets (4 mg) by mouth every morning (before breakfast)  at AM Yes Poi Hinds MD   Iron-Vitamin C (VITRON-C PO) Take 1 tablet by mouth daily  at AM Yes Reported, Patient   Lactobacillus (PROBIOTIC ACIDOPHILUS) CAPS Take 1 capsule by mouth daily   at AM Yes Reported, Patient   multivitamin w/minerals (THERA-VIT-M) tablet Take 1 tablet by mouth daily  at AM Yes Reported, Patient   pantoprazole (PROTONIX) 40 MG EC tablet Take 1 tablet (40 mg) by mouth every morning (before breakfast)  at AM Yes Elvi Rashid MD   potassium citrate (UROCIT-K) 10 MEQ (1080 MG) CR tablet TAKE 1 TABLET BY MOUTH TWICE A DAY  at PM Yes Art Cheung MD   simvastatin (ZOCOR) 20 MG tablet Take 1 tablet (20 mg) by mouth At Bedtime  at HS Yes Art Cheung MD   tamsulosin  (FLOMAX) 0.4 MG capsule Take 1 capsule (0.4 mg) by mouth daily  at AM Yes Art Cheung MD   blood glucose (ACCU-CHEK UCHE PLUS) test strip 1 strip by In Vitro route daily   Art Cheung MD   blood glucose monitoring (ACCU-CHEK UCHE PLUS) meter device kit TEST BLOOD SUGAR ONE TIME DAILY  OR AS DIRECTED   Art Cheung MD        show

## 2021-07-04 ENCOUNTER — HEALTH MAINTENANCE LETTER (OUTPATIENT)
Age: 71
End: 2021-07-04

## 2021-08-06 ENCOUNTER — LAB REQUISITION (OUTPATIENT)
Dept: LAB | Facility: CLINIC | Age: 71
End: 2021-08-06
Payer: COMMERCIAL

## 2021-08-06 DIAGNOSIS — L08.9 LOCAL INFECTION OF THE SKIN AND SUBCUTANEOUS TISSUE, UNSPECIFIED: ICD-10-CM

## 2021-08-06 PROCEDURE — 87070 CULTURE OTHR SPECIMN AEROBIC: CPT | Mod: ORL | Performed by: DERMATOLOGY

## 2021-08-09 LAB — BACTERIA SPEC CULT: NO GROWTH

## 2021-10-24 ENCOUNTER — HEALTH MAINTENANCE LETTER (OUTPATIENT)
Age: 71
End: 2021-10-24

## 2021-10-25 DIAGNOSIS — R01.1 HEART MURMUR: Primary | ICD-10-CM

## 2022-01-08 ENCOUNTER — OFFICE VISIT (OUTPATIENT)
Dept: URGENT CARE | Facility: URGENT CARE | Age: 72
End: 2022-01-08
Payer: COMMERCIAL

## 2022-01-08 VITALS
DIASTOLIC BLOOD PRESSURE: 64 MMHG | BODY MASS INDEX: 27.37 KG/M2 | RESPIRATION RATE: 12 BRPM | HEART RATE: 78 BPM | OXYGEN SATURATION: 97 % | TEMPERATURE: 97.9 F | WEIGHT: 180 LBS | SYSTOLIC BLOOD PRESSURE: 124 MMHG

## 2022-01-08 DIAGNOSIS — M25.562 ARTHRALGIA OF BOTH KNEES: Primary | ICD-10-CM

## 2022-01-08 DIAGNOSIS — M25.561 ARTHRALGIA OF BOTH KNEES: Primary | ICD-10-CM

## 2022-01-08 LAB
BASOPHILS # BLD AUTO: 0 10E3/UL (ref 0–0.2)
BASOPHILS NFR BLD AUTO: 0 %
CRP SERPL-MCNC: 52 MG/L (ref 0–8)
EOSINOPHIL # BLD AUTO: 0 10E3/UL (ref 0–0.7)
EOSINOPHIL NFR BLD AUTO: 0 %
ERYTHROCYTE [DISTWIDTH] IN BLOOD BY AUTOMATED COUNT: 13.9 % (ref 10–15)
ERYTHROCYTE [SEDIMENTATION RATE] IN BLOOD BY WESTERGREN METHOD: 44 MM/HR (ref 0–20)
HCT VFR BLD AUTO: 32 % (ref 40–53)
HGB BLD-MCNC: 10.4 G/DL (ref 13.3–17.7)
LYMPHOCYTES # BLD AUTO: 1 10E3/UL (ref 0.8–5.3)
LYMPHOCYTES NFR BLD AUTO: 12 %
MCH RBC QN AUTO: 30.4 PG (ref 26.5–33)
MCHC RBC AUTO-ENTMCNC: 32.5 G/DL (ref 31.5–36.5)
MCV RBC AUTO: 94 FL (ref 78–100)
MONOCYTES # BLD AUTO: 0.6 10E3/UL (ref 0–1.3)
MONOCYTES NFR BLD AUTO: 7 %
NEUTROPHILS # BLD AUTO: 6.7 10E3/UL (ref 1.6–8.3)
NEUTROPHILS NFR BLD AUTO: 82 %
PLATELET # BLD AUTO: 206 10E3/UL (ref 150–450)
RBC # BLD AUTO: 3.42 10E6/UL (ref 4.4–5.9)
WBC # BLD AUTO: 8.2 10E3/UL (ref 4–11)

## 2022-01-08 PROCEDURE — 36415 COLL VENOUS BLD VENIPUNCTURE: CPT | Performed by: INTERNAL MEDICINE

## 2022-01-08 PROCEDURE — 86140 C-REACTIVE PROTEIN: CPT | Performed by: INTERNAL MEDICINE

## 2022-01-08 PROCEDURE — 99213 OFFICE O/P EST LOW 20 MIN: CPT | Performed by: INTERNAL MEDICINE

## 2022-01-08 PROCEDURE — 85652 RBC SED RATE AUTOMATED: CPT | Performed by: INTERNAL MEDICINE

## 2022-01-08 PROCEDURE — 85025 COMPLETE CBC W/AUTO DIFF WBC: CPT | Performed by: INTERNAL MEDICINE

## 2022-01-08 NOTE — PROGRESS NOTES
Assessment & Plan     Arthralgia of both knees  This is most likely to be a reactive synovitis and very unlikely to represent a pyogenic process whether it be prosthetic joint infection or septic arthritis.  Will manage symptomatically and get baseline labs.  If symptoms fail to improve or new symptoms evolve then assess further.  Acetaminophen and topicals.    - CBC with platelets and differential; Future  - ESR: Erythrocyte sedimentation rate; Future  - CRP, inflammation; Future  - CBC with platelets and differential  - ESR: Erythrocyte sedimentation rate  - CRP, inflammation    Eduardo Sanches MD  [facility] URG[facility]    Subjective     HPI   Patient notes that he had a dental procedure earlier this week.  Has a history of TKA 5-6 years ago so he typically uses prophylaxis.  Yesterday he noted a lot of bilateral knee soreness which was fairly intense. Denies fevers or chills.  Denies swelling or redness.  Primary care is with Dr. Asher.      Review of Systems   Constitutional, HEENT, cardiovascular, pulmonary, gi and gu systems are negative, except as otherwise noted.      Objective    /64 (BP Location: Left arm, Patient Position: Sitting, Cuff Size: Adult Regular)   Pulse 78   Temp 97.9  F (36.6  C) (Tympanic)   Resp 12   Wt 81.6 kg (180 lb)   SpO2 97%   BMI 27.37 kg/m    Body mass index is 27.37 kg/m .  Physical Exam   GENERAL APPEARANCE: healthy, alert and no distress  KNEES: bilateral knees show healed longitudinal incisions over the anterior surface without redness, warmth or effusion; ROM is limited to 75-80 degrees of flexion and the patient states that he chronically is not able to achieve 90 degrees; no focal tenderness    Results for orders placed or performed in visit on 01/08/22 (from the past 24 hour(s))   CBC with platelets and differential    Narrative    The following orders were created for panel order CBC with platelets and differential.  Procedure                                Abnormality         Status                     ---------                               -----------         ------                     CBC with platelets and d...[[id number]]  Abnormal            Final result                 Please view results for these tests on the individual orders.   ESR: Erythrocyte sedimentation rate   Result Value Ref Range    Erythrocyte Sedimentation Rate 44 (H) 0 - 20 mm/hr   CBC with platelets and differential   Result Value Ref Range    WBC Count 8.2 4.0 - 11.0 10e3/uL    RBC Count 3.42 (L) 4.40 - 5.90 10e6/uL    Hemoglobin 10.4 (L) 13.3 - 17.7 g/dL    Hematocrit 32.0 (L) 40.0 - 53.0 %    MCV 94 78 - 100 fL    MCH 30.4 26.5 - 33.0 pg    MCHC 32.5 31.5 - 36.5 g/dL    RDW 13.9 10.0 - 15.0 %    Platelet Count 206 150 - 450 10e3/uL    % Neutrophils 82 %    % Lymphocytes 12 %    % Monocytes 7 %    % Eosinophils 0 %    % Basophils 0 %    Absolute Neutrophils 6.7 1.6 - 8.3 10e3/uL    Absolute Lymphocytes 1.0 0.8 - 5.3 10e3/uL    Absolute Monocytes 0.6 0.0 - 1.3 10e3/uL    Absolute Eosinophils 0.0 0.0 - 0.7 10e3/uL    Absolute Basophils 0.0 0.0 - 0.2 10e3/uL

## 2022-01-08 NOTE — PATIENT INSTRUCTIONS
The probability of this being an infection of the knee joint is quite low but the diagnosis can be tricky at times.  We will obtain some baseline blood work.  Greatest likelihood is that this is a reactive arthritis where something has triggered an inflammation in the knees which will just go away over the coming week.    Use acetaminophen (Tylenol) 1,000 mg up to three times daily as needed for pain.  Also can use OTC topicals like IcyHot or AsperCreme.    If you start having high fevers (> 100.5) or the knees start swelling or otherwise changing in appearance than you should be reassessed.  If not better in one week, I would again recommend a follow-up.

## 2022-02-13 ENCOUNTER — HEALTH MAINTENANCE LETTER (OUTPATIENT)
Age: 72
End: 2022-02-13

## 2022-04-12 ENCOUNTER — MEDICAL CORRESPONDENCE (OUTPATIENT)
Dept: HEALTH INFORMATION MANAGEMENT | Facility: CLINIC | Age: 72
End: 2022-04-12
Payer: COMMERCIAL

## 2022-04-13 ENCOUNTER — MEDICAL CORRESPONDENCE (OUTPATIENT)
Dept: ULTRASOUND IMAGING | Facility: CLINIC | Age: 72
End: 2022-04-13
Payer: COMMERCIAL

## 2022-04-13 ENCOUNTER — LAB (OUTPATIENT)
Dept: LAB | Facility: CLINIC | Age: 72
End: 2022-04-13
Attending: INTERNAL MEDICINE
Payer: COMMERCIAL

## 2022-04-13 ENCOUNTER — HOSPITAL ENCOUNTER (OUTPATIENT)
Dept: ULTRASOUND IMAGING | Facility: CLINIC | Age: 72
Discharge: HOME | End: 2022-04-13
Attending: INTERNAL MEDICINE
Payer: COMMERCIAL

## 2022-04-13 DIAGNOSIS — N17.9 ACUTE KIDNEY FAILURE, UNSPECIFIED (H): Primary | ICD-10-CM

## 2022-04-13 DIAGNOSIS — N17.9 ACUTE KIDNEY FAILURE (H): ICD-10-CM

## 2022-04-13 DIAGNOSIS — E11.21 DIABETIC KIDNEY DISEASE (H): ICD-10-CM

## 2022-04-13 LAB
ALBUMIN SERPL-MCNC: 4 G/DL (ref 3.4–5)
ALBUMIN UR-MCNC: 10 MG/DL
ALP SERPL-CCNC: 98 U/L (ref 40–150)
ALT SERPL W P-5'-P-CCNC: 34 U/L (ref 0–70)
ANION GAP SERPL CALCULATED.3IONS-SCNC: 6 MMOL/L (ref 3–14)
APPEARANCE UR: CLEAR
AST SERPL W P-5'-P-CCNC: 20 U/L (ref 0–45)
BASOPHILS # BLD AUTO: 0 10E3/UL (ref 0–0.2)
BASOPHILS NFR BLD AUTO: 1 %
BILIRUB SERPL-MCNC: 0.4 MG/DL (ref 0.2–1.3)
BILIRUB UR QL STRIP: NEGATIVE
BUN SERPL-MCNC: 51 MG/DL (ref 7–30)
CALCIUM SERPL-MCNC: 9.1 MG/DL (ref 8.5–10.1)
CHLORIDE BLD-SCNC: 107 MMOL/L (ref 94–109)
CO2 SERPL-SCNC: 24 MMOL/L (ref 20–32)
COLOR UR AUTO: ABNORMAL
CREAT SERPL-MCNC: 3.63 MG/DL (ref 0.66–1.25)
EOSINOPHIL # BLD AUTO: 0.1 10E3/UL (ref 0–0.7)
EOSINOPHIL NFR BLD AUTO: 2 %
ERYTHROCYTE [DISTWIDTH] IN BLOOD BY AUTOMATED COUNT: 12.9 % (ref 10–15)
GFR SERPL CREATININE-BSD FRML MDRD: 17 ML/MIN/1.73M2
GLUCOSE BLD-MCNC: 124 MG/DL (ref 70–99)
GLUCOSE UR STRIP-MCNC: NEGATIVE MG/DL
HBA1C MFR BLD: 6.9 % (ref 0–5.6)
HCT VFR BLD AUTO: 28.7 % (ref 40–53)
HGB BLD-MCNC: 9.6 G/DL (ref 13.3–17.7)
HGB UR QL STRIP: ABNORMAL
IMM GRANULOCYTES # BLD: 0 10E3/UL
IMM GRANULOCYTES NFR BLD: 0 %
KETONES UR STRIP-MCNC: NEGATIVE MG/DL
LEUKOCYTE ESTERASE UR QL STRIP: ABNORMAL
LYMPHOCYTES # BLD AUTO: 1.2 10E3/UL (ref 0.8–5.3)
LYMPHOCYTES NFR BLD AUTO: 25 %
MCH RBC QN AUTO: 31.9 PG (ref 26.5–33)
MCHC RBC AUTO-ENTMCNC: 33.4 G/DL (ref 31.5–36.5)
MCV RBC AUTO: 95 FL (ref 78–100)
MONOCYTES # BLD AUTO: 0.4 10E3/UL (ref 0–1.3)
MONOCYTES NFR BLD AUTO: 8 %
NEUTROPHILS # BLD AUTO: 3 10E3/UL (ref 1.6–8.3)
NEUTROPHILS NFR BLD AUTO: 64 %
NITRATE UR QL: NEGATIVE
NRBC # BLD AUTO: 0 10E3/UL
NRBC BLD AUTO-RTO: 0 /100
PH UR STRIP: 5 [PH] (ref 5–7)
PHOSPHATE SERPL-MCNC: 4.7 MG/DL (ref 2.5–4.5)
PLATELET # BLD AUTO: 191 10E3/UL (ref 150–450)
POTASSIUM BLD-SCNC: 4.7 MMOL/L (ref 3.4–5.3)
PROT SERPL-MCNC: 8 G/DL (ref 6.8–8.8)
RBC # BLD AUTO: 3.01 10E6/UL (ref 4.4–5.9)
RBC URINE: 1 /HPF
SODIUM SERPL-SCNC: 137 MMOL/L (ref 133–144)
SODIUM UR-SCNC: 75 MMOL/L
SP GR UR STRIP: 1.01 (ref 1–1.03)
UROBILINOGEN UR STRIP-MCNC: NORMAL MG/DL
WBC # BLD AUTO: 4.7 10E3/UL (ref 4–11)
WBC CLUMPS #/AREA URNS HPF: PRESENT /HPF
WBC URINE: 5 /HPF

## 2022-04-13 PROCEDURE — 84300 ASSAY OF URINE SODIUM: CPT

## 2022-04-13 PROCEDURE — 76770 US EXAM ABDO BACK WALL COMP: CPT

## 2022-04-13 PROCEDURE — 82310 ASSAY OF CALCIUM: CPT

## 2022-04-13 PROCEDURE — 36415 COLL VENOUS BLD VENIPUNCTURE: CPT

## 2022-04-13 PROCEDURE — 83036 HEMOGLOBIN GLYCOSYLATED A1C: CPT

## 2022-04-13 PROCEDURE — 84100 ASSAY OF PHOSPHORUS: CPT

## 2022-04-13 PROCEDURE — 85025 COMPLETE CBC W/AUTO DIFF WBC: CPT

## 2022-04-13 PROCEDURE — 81001 URINALYSIS AUTO W/SCOPE: CPT

## 2022-04-18 ENCOUNTER — MEDICAL CORRESPONDENCE (OUTPATIENT)
Dept: HEALTH INFORMATION MANAGEMENT | Facility: CLINIC | Age: 72
End: 2022-04-18
Payer: COMMERCIAL

## 2022-04-18 DIAGNOSIS — N17.9 ACUTE KIDNEY FAILURE, UNSPECIFIED (H): Primary | ICD-10-CM

## 2022-05-10 ENCOUNTER — TRANSFERRED RECORDS (OUTPATIENT)
Dept: HEALTH INFORMATION MANAGEMENT | Facility: CLINIC | Age: 72
End: 2022-05-10

## 2022-06-05 ENCOUNTER — HEALTH MAINTENANCE LETTER (OUTPATIENT)
Age: 72
End: 2022-06-05

## 2022-06-16 ENCOUNTER — TELEPHONE (OUTPATIENT)
Dept: INFUSION THERAPY | Facility: CLINIC | Age: 72
End: 2022-06-16
Payer: COMMERCIAL

## 2022-06-16 ENCOUNTER — MEDICAL CORRESPONDENCE (OUTPATIENT)
Dept: HEALTH INFORMATION MANAGEMENT | Facility: CLINIC | Age: 72
End: 2022-06-16

## 2022-06-16 NOTE — TELEPHONE ENCOUNTER
Left voicemail message for patient requesting a return call regarding scheduling infusion, 2 orders Aranesp, Feraheme. NEW ORDER

## 2022-06-20 ENCOUNTER — REFERRAL (OUTPATIENT)
Dept: TRANSPLANT | Facility: CLINIC | Age: 72
End: 2022-06-20

## 2022-06-20 NOTE — LETTER
June 28, 2022        Los Renee  [address]  [facility] [address]      Dear Los,       You have recently expressed interest in our Solid Organ Transplant Program and have requested to begin the evaluation process.  We have made several attempts to get in touch with you but have been unable to reach you.    If you are still interested and/or have any questions, please contact us at  [phone] or [phone] and ask to speak with the .      Monday - Friday, between the hours of 8:00am and 5:00pm central time.    We look forward to hearing from you.      Regards,     Solid Organ Transplant Intake Team  [place] [id number] [facility]  [address], [facility] [address]  [place] [address]

## 2022-06-20 NOTE — LETTER
Los OLIVERIO GoldsteinVíctor  [address] [facility] [address]                August 22, 2022      Dear Mr. Renee,     You were referred to our Kidney Transplant Program on 06/20/2022. Since you have expressed to our Office that you do not want to proceed with pursuing a kidney transplant at this time. Per your wishes, your kidney transplant referral has now been ended.     Please feel free to contact our Office at anytime in the future if you do wish to proceed with a kidney transplant evaluation and we will be very happy to assist you.     Please let me know of any questions or concerns at our Main Office Number at [phone] or at my Direct Number at [phone].       Sincerely,     Yanira Sims, RN, BSN  Pre Kidney Pancreas Transplant Coordinator   [facility]  Solid Organ Transplant Care   [address] [place] [address] [address]  [address] [address]  [email or web address]   Office: [phone] Direct Number: [phone]   Fax: [phone]  Employed by [employer]     CC's: Dr. Lyndon Varner, Dr. Karley Irene

## 2022-06-21 ENCOUNTER — TRANSCRIBE ORDERS (OUTPATIENT)
Dept: PHARMACY | Facility: CLINIC | Age: 72
End: 2022-06-21
Payer: COMMERCIAL

## 2022-06-21 DIAGNOSIS — D63.1 ANEMIA IN STAGE 4 CHRONIC KIDNEY DISEASE (H): Primary | ICD-10-CM

## 2022-06-21 DIAGNOSIS — N18.4 ANEMIA IN STAGE 4 CHRONIC KIDNEY DISEASE (H): Primary | ICD-10-CM

## 2022-06-21 RX ORDER — METHYLPREDNISOLONE SODIUM SUCCINATE 125 MG/2ML
125 INJECTION, POWDER, LYOPHILIZED, FOR SOLUTION INTRAMUSCULAR; INTRAVENOUS
Status: CANCELLED
Start: 2022-07-13

## 2022-06-21 RX ORDER — DIPHENHYDRAMINE HYDROCHLORIDE 50 MG/ML
50 INJECTION INTRAMUSCULAR; INTRAVENOUS
Status: CANCELLED
Start: 2022-07-13

## 2022-06-21 RX ORDER — NALOXONE HYDROCHLORIDE 0.4 MG/ML
0.2 INJECTION, SOLUTION INTRAMUSCULAR; INTRAVENOUS; SUBCUTANEOUS
Status: CANCELLED | OUTPATIENT
Start: 2022-07-13

## 2022-06-21 RX ORDER — EPINEPHRINE 1 MG/ML
0.3 INJECTION, SOLUTION INTRAMUSCULAR; SUBCUTANEOUS EVERY 5 MIN PRN
Status: CANCELLED | OUTPATIENT
Start: 2022-07-13

## 2022-06-21 RX ORDER — ALBUTEROL SULFATE 0.83 MG/ML
2.5 SOLUTION RESPIRATORY (INHALATION)
Status: CANCELLED | OUTPATIENT
Start: 2022-07-13

## 2022-06-21 RX ORDER — ALBUTEROL SULFATE 90 UG/1
1-2 AEROSOL, METERED RESPIRATORY (INHALATION)
Status: CANCELLED
Start: 2022-07-13

## 2022-06-21 RX ORDER — MEPERIDINE HYDROCHLORIDE 25 MG/ML
25 INJECTION INTRAMUSCULAR; INTRAVENOUS; SUBCUTANEOUS EVERY 30 MIN PRN
Status: CANCELLED | OUTPATIENT
Start: 2022-07-13

## 2022-06-21 RX ORDER — HEPARIN SODIUM,PORCINE 10 UNIT/ML
5 VIAL (ML) INTRAVENOUS
Status: CANCELLED | OUTPATIENT
Start: 2022-07-13

## 2022-06-21 RX ORDER — HEPARIN SODIUM (PORCINE) LOCK FLUSH IV SOLN 100 UNIT/ML 100 UNIT/ML
5 SOLUTION INTRAVENOUS
Status: CANCELLED | OUTPATIENT
Start: 2022-07-13

## 2022-06-23 ENCOUNTER — TELEPHONE (OUTPATIENT)
Dept: MULTI SPECIALTY CLINIC | Facility: CLINIC | Age: 72
End: 2022-06-23

## 2022-06-23 ENCOUNTER — TRANSCRIBE ORDERS (OUTPATIENT)
Dept: PHARMACY | Facility: CLINIC | Age: 72
End: 2022-06-23

## 2022-06-23 DIAGNOSIS — N18.4 ANEMIA IN STAGE 4 CHRONIC KIDNEY DISEASE (H): Primary | ICD-10-CM

## 2022-06-23 DIAGNOSIS — D63.1 ANEMIA IN STAGE 4 CHRONIC KIDNEY DISEASE (H): Primary | ICD-10-CM

## 2022-06-23 RX ORDER — MEPERIDINE HYDROCHLORIDE 25 MG/ML
25 INJECTION INTRAMUSCULAR; INTRAVENOUS; SUBCUTANEOUS EVERY 30 MIN PRN
Status: CANCELLED | OUTPATIENT
Start: 2022-07-13

## 2022-06-23 RX ORDER — METHYLPREDNISOLONE SODIUM SUCCINATE 125 MG/2ML
125 INJECTION, POWDER, LYOPHILIZED, FOR SOLUTION INTRAMUSCULAR; INTRAVENOUS
Status: CANCELLED
Start: 2022-07-13

## 2022-06-23 RX ORDER — ALBUTEROL SULFATE 90 UG/1
1-2 AEROSOL, METERED RESPIRATORY (INHALATION)
Status: CANCELLED
Start: 2022-07-13

## 2022-06-23 RX ORDER — DIPHENHYDRAMINE HYDROCHLORIDE 50 MG/ML
50 INJECTION INTRAMUSCULAR; INTRAVENOUS
Status: CANCELLED
Start: 2022-07-13

## 2022-06-23 RX ORDER — EPINEPHRINE 1 MG/ML
0.3 INJECTION, SOLUTION INTRAMUSCULAR; SUBCUTANEOUS EVERY 5 MIN PRN
Status: CANCELLED | OUTPATIENT
Start: 2022-07-13

## 2022-06-23 RX ORDER — ALBUTEROL SULFATE 0.83 MG/ML
2.5 SOLUTION RESPIRATORY (INHALATION)
Status: CANCELLED | OUTPATIENT
Start: 2022-07-13

## 2022-06-23 RX ORDER — NALOXONE HYDROCHLORIDE 0.4 MG/ML
0.2 INJECTION, SOLUTION INTRAMUSCULAR; INTRAVENOUS; SUBCUTANEOUS
Status: CANCELLED | OUTPATIENT
Start: 2022-07-13

## 2022-06-23 NOTE — PROGRESS NOTES
Clarified with Elzbieta at [facility]. aranesp is monthly if hgb <10. KLT 6/24/22

## 2022-08-22 NOTE — TELEPHONE ENCOUNTER
Notified Dr. Lyndon Varner of patient's wishes for our Office to stop calling him to register as a new kidney transplant candidate. Dr. Varner responded with acknowledgement. Ended pre kidney transplant referral today. Generated letter today in Epic - electronically sent to pt/providers.

## 2022-10-15 ENCOUNTER — HEALTH MAINTENANCE LETTER (OUTPATIENT)
Age: 72
End: 2022-10-15

## 2023-06-01 ENCOUNTER — HEALTH MAINTENANCE LETTER (OUTPATIENT)
Age: 73
End: 2023-06-01

## 2023-06-11 ENCOUNTER — HEALTH MAINTENANCE LETTER (OUTPATIENT)
Age: 73
End: 2023-06-11

## 2024-01-07 ENCOUNTER — HEALTH MAINTENANCE LETTER (OUTPATIENT)
Age: 74
End: 2024-01-07

## 2024-08-04 ENCOUNTER — HEALTH MAINTENANCE LETTER (OUTPATIENT)
Age: 74
End: 2024-08-04

## 2025-02-22 ENCOUNTER — HEALTH MAINTENANCE LETTER (OUTPATIENT)
Age: 75
End: 2025-02-22

## (undated) DEVICE — SOL NACL 0.9% IRRIG 1000ML BOTTLE 2F7124

## (undated) DEVICE — Device

## (undated) DEVICE — SHEATH URETERAL ACCESS NAVIGATOR HD 11/13FRX36CM M0062502220

## (undated) DEVICE — TUBING SUCTION 12"X1/4" N612

## (undated) DEVICE — LASER FIBER HOLMIUM FLEXIVA 200UM M0068403910 840-391

## (undated) DEVICE — PACK CYSTOSCOPY SBA15CYFSI

## (undated) DEVICE — DEVICE MULTI TORQUE TD01

## (undated) DEVICE — PREP SKIN SCRUB TRAY 4461A

## (undated) DEVICE — ENDO SEAL BX PORT BPS-A

## (undated) DEVICE — DRAPE SHEET REV FOLD 3/4 9349

## (undated) DEVICE — PREP CHLORAPREP 26ML TINTED ORANGE  260815

## (undated) DEVICE — DRAPE LEGGINGS 8421

## (undated) DEVICE — JELLY LUBRICATING SURGILUBE 4OZ TUBE

## (undated) DEVICE — GOWN XXLG REINFORCED 9071EL

## (undated) DEVICE — INFLATION DEVICE ENCORE  26 PRESSURE GAUGE 20ML M0067101140

## (undated) DEVICE — WIPES FOLEY CARE SURESTEP PROVON DFC100

## (undated) DEVICE — TUBING EXTENSION SET 20" B1327

## (undated) DEVICE — PUMP SYSTEM SINGLE ACTION M0067201000

## (undated) DEVICE — SHEATH URETERAL ACCESS NAVIGATOR HD 11/13FRX46CM M0062502230

## (undated) DEVICE — GUIDEWIRE TERUMO .035X180 ANG GR3508

## (undated) DEVICE — TUBING IRRIG TUR Y TYPE 96" LF 6543-01

## (undated) DEVICE — GLOVE PROTEXIS BLUE W/NEU-THERA 8.0  2D73EB80

## (undated) DEVICE — POSITIONER PT PRONESAFE HEAD REST W/DERMAPROX INSERT 40599

## (undated) DEVICE — BASKET NITINOL TIPLESS HALO  1.5FRX120CM 554120

## (undated) DEVICE — CATH URETERAL DUAL LUMEN 10FRX54CM M0064051000

## (undated) DEVICE — PROBE SET CYBERWAND LITHO GYRUS RENAL/BLADDER CW-RBP

## (undated) DEVICE — CATH FOLEY COUDE 18FR 5ML LUBRICATH LATEX 0168L18

## (undated) DEVICE — NEEDLE TROCAR DISP 18 X 20GA DTN-18-20.0

## (undated) DEVICE — SOL NACL 0.9% IRRIG 3000ML BAG 2B7477

## (undated) DEVICE — DRAPE NEURO TIBURON W/XL FLUID CONTROL POUCH

## (undated) DEVICE — SU PROLENE 2-0 FS 18" 8685H

## (undated) DEVICE — SYR 50ML CATH TIP W/O NDL 309620

## (undated) DEVICE — GUIDEWIRE SENSOR DUAL FLEX STR 0.035"X150CM M0066703080

## (undated) DEVICE — GLOVE PROTEXIS MICRO 7.5  2D73PM75

## (undated) DEVICE — RAD RX ISOVUE 300 (150ML) 61% IOPAMIDOL 300MG/ML CHRG PER ML

## (undated) DEVICE — SYR 50ML LL W/O NDL 309653

## (undated) DEVICE — RAD NDL TROCAR 18GAX15CM G00945

## (undated) DEVICE — CATH BALLOON DILATION X FORCE 30FR 10MMX15CM 996101

## (undated) DEVICE — CATH TRAY FOLEY COUDE SURESTEP 16FR W/URNE MTR STLK A304716A

## (undated) DEVICE — PAD CHUX UNDERPAD 23X24" 7136

## (undated) DEVICE — CATH ANGIO IMPRESS 5FRX65CM BERENSTEIN 56538BER

## (undated) DEVICE — MANIFOLD NEPTUNE 4 PORT 700-20

## (undated) DEVICE — PACK SPECIAL PROCEDURE CUSTOM

## (undated) DEVICE — GUIDEWIRE AMPLATZ SUPER STIFF 0.035"X180CM M001465250

## (undated) DEVICE — LINEN TOWEL PACK X5 5464

## (undated) DEVICE — SHEATH URETERAL ACCESS NAVIGATOR 11/13FRX46CM M0062502040

## (undated) DEVICE — SOL WATER IRRIG 1000ML BOTTLE 2F7114

## (undated) RX ORDER — FENTANYL CITRATE 50 UG/ML
INJECTION, SOLUTION INTRAMUSCULAR; INTRAVENOUS
Status: DISPENSED
Start: 2020-10-21

## (undated) RX ORDER — PROPOFOL 10 MG/ML
INJECTION, EMULSION INTRAVENOUS
Status: DISPENSED
Start: 2020-11-16

## (undated) RX ORDER — EPINEPHRINE IN SOD CHLOR,ISO 1 MG/10 ML
SYRINGE (ML) INTRAVENOUS
Status: DISPENSED
Start: 2020-12-26

## (undated) RX ORDER — OXYCODONE HYDROCHLORIDE 5 MG/1
TABLET ORAL
Status: DISPENSED
Start: 2020-11-16

## (undated) RX ORDER — ONDANSETRON 2 MG/ML
INJECTION INTRAMUSCULAR; INTRAVENOUS
Status: DISPENSED
Start: 2020-11-16

## (undated) RX ORDER — CEFAZOLIN SODIUM IN 0.9 % NACL 3 G/100 ML
INTRAVENOUS SOLUTION, PIGGYBACK (ML) INTRAVENOUS
Status: DISPENSED
Start: 2020-11-16

## (undated) RX ORDER — ONDANSETRON 2 MG/ML
INJECTION INTRAMUSCULAR; INTRAVENOUS
Status: DISPENSED
Start: 2020-10-21

## (undated) RX ORDER — FENTANYL CITRATE 50 UG/ML
INJECTION, SOLUTION INTRAMUSCULAR; INTRAVENOUS
Status: DISPENSED
Start: 2020-12-26

## (undated) RX ORDER — FENTANYL CITRATE 50 UG/ML
INJECTION, SOLUTION INTRAMUSCULAR; INTRAVENOUS
Status: DISPENSED
Start: 2018-07-05

## (undated) RX ORDER — DEXAMETHASONE SODIUM PHOSPHATE 4 MG/ML
INJECTION, SOLUTION INTRA-ARTICULAR; INTRALESIONAL; INTRAMUSCULAR; INTRAVENOUS; SOFT TISSUE
Status: DISPENSED
Start: 2020-10-21

## (undated) RX ORDER — FUROSEMIDE 10 MG/ML
INJECTION INTRAMUSCULAR; INTRAVENOUS
Status: DISPENSED
Start: 2020-11-16

## (undated) RX ORDER — PROPOFOL 10 MG/ML
INJECTION, EMULSION INTRAVENOUS
Status: DISPENSED
Start: 2020-10-21

## (undated) RX ORDER — CEFAZOLIN SODIUM 1 G/3ML
INJECTION, POWDER, FOR SOLUTION INTRAMUSCULAR; INTRAVENOUS
Status: DISPENSED
Start: 2020-11-16

## (undated) RX ORDER — FENTANYL CITRATE 50 UG/ML
INJECTION, SOLUTION INTRAMUSCULAR; INTRAVENOUS
Status: DISPENSED
Start: 2020-11-16

## (undated) RX ORDER — HYDROMORPHONE HYDROCHLORIDE 1 MG/ML
INJECTION, SOLUTION INTRAMUSCULAR; INTRAVENOUS; SUBCUTANEOUS
Status: DISPENSED
Start: 2020-10-21

## (undated) RX ORDER — FENTANYL CITRATE 50 UG/ML
INJECTION, SOLUTION INTRAMUSCULAR; INTRAVENOUS
Status: DISPENSED
Start: 2020-09-30

## (undated) RX ORDER — LIDOCAINE HYDROCHLORIDE 20 MG/ML
INJECTION, SOLUTION EPIDURAL; INFILTRATION; INTRACAUDAL; PERINEURAL
Status: DISPENSED
Start: 2020-11-16

## (undated) RX ORDER — CEFAZOLIN SODIUM 2 G/100ML
INJECTION, SOLUTION INTRAVENOUS
Status: DISPENSED
Start: 2020-10-21

## (undated) RX ORDER — LIDOCAINE HYDROCHLORIDE 20 MG/ML
INJECTION, SOLUTION EPIDURAL; INFILTRATION; INTRACAUDAL; PERINEURAL
Status: DISPENSED
Start: 2020-10-21